# Patient Record
Sex: FEMALE | Race: WHITE | Employment: FULL TIME | URBAN - METROPOLITAN AREA
[De-identification: names, ages, dates, MRNs, and addresses within clinical notes are randomized per-mention and may not be internally consistent; named-entity substitution may affect disease eponyms.]

---

## 2018-01-01 ENCOUNTER — APPOINTMENT (OUTPATIENT)
Dept: ULTRASOUND IMAGING | Facility: CLINIC | Age: 48
End: 2018-01-01
Attending: OBSTETRICS & GYNECOLOGY
Payer: COMMERCIAL

## 2018-01-01 ENCOUNTER — TRANSFERRED RECORDS (OUTPATIENT)
Dept: HEALTH INFORMATION MANAGEMENT | Facility: CLINIC | Age: 48
End: 2018-01-01

## 2018-01-01 ENCOUNTER — HOSPITAL ENCOUNTER (OUTPATIENT)
Facility: CLINIC | Age: 48
Discharge: HOME | End: 2018-10-23
Attending: OBSTETRICS & GYNECOLOGY | Admitting: OBSTETRICS & GYNECOLOGY
Payer: COMMERCIAL

## 2018-01-01 VITALS
OXYGEN SATURATION: 98 % | DIASTOLIC BLOOD PRESSURE: 104 MMHG | HEART RATE: 90 BPM | TEMPERATURE: 99 F | SYSTOLIC BLOOD PRESSURE: 151 MMHG | RESPIRATION RATE: 16 BRPM

## 2018-01-01 LAB
COPATH REPORT: NORMAL
INR PPP: 1.21 (ref 0.86–1.14)

## 2018-01-01 PROCEDURE — 36415 COLL VENOUS BLD VENIPUNCTURE: CPT

## 2018-01-01 PROCEDURE — 88305 TISSUE EXAM BY PATHOLOGIST: CPT | Mod: 26 | Performed by: OBSTETRICS & GYNECOLOGY

## 2018-01-01 PROCEDURE — 00000155 ZZHCL STATISTIC H-CELL BLOCK W/STAIN: Performed by: OBSTETRICS & GYNECOLOGY

## 2018-01-01 PROCEDURE — 88112 CYTOPATH CELL ENHANCE TECH: CPT | Performed by: OBSTETRICS & GYNECOLOGY

## 2018-01-01 PROCEDURE — 25000125 ZZHC RX 250: Performed by: RADIOLOGY

## 2018-01-01 PROCEDURE — 88112 CYTOPATH CELL ENHANCE TECH: CPT | Mod: 26 | Performed by: OBSTETRICS & GYNECOLOGY

## 2018-01-01 PROCEDURE — 00000102 ZZHCL STATISTIC CYTO WRIGHT STAIN TC: Performed by: OBSTETRICS & GYNECOLOGY

## 2018-01-01 PROCEDURE — 27210190 US PARACENTESIS

## 2018-01-01 PROCEDURE — 85610 PROTHROMBIN TIME: CPT | Performed by: RADIOLOGY

## 2018-01-01 PROCEDURE — 88305 TISSUE EXAM BY PATHOLOGIST: CPT | Performed by: OBSTETRICS & GYNECOLOGY

## 2018-01-01 PROCEDURE — 40000863 ZZH STATISTIC RADIOLOGY XRAY, US, CT, MAR, NM

## 2018-01-01 RX ORDER — ALBUMIN (HUMAN) 12.5 G/50ML
12.5 SOLUTION INTRAVENOUS ONCE
Status: DISCONTINUED | OUTPATIENT
Start: 2018-01-01 | End: 2018-01-01 | Stop reason: HOSPADM

## 2018-01-01 RX ORDER — LIDOCAINE HYDROCHLORIDE 10 MG/ML
10 INJECTION, SOLUTION EPIDURAL; INFILTRATION; INTRACAUDAL; PERINEURAL ONCE
Status: COMPLETED | OUTPATIENT
Start: 2018-01-01 | End: 2018-01-01

## 2018-01-01 RX ORDER — LIDOCAINE 40 MG/G
CREAM TOPICAL
Status: DISCONTINUED | OUTPATIENT
Start: 2018-01-01 | End: 2018-01-01 | Stop reason: HOSPADM

## 2018-01-01 RX ADMIN — LIDOCAINE HYDROCHLORIDE 10 ML: 10 INJECTION, SOLUTION EPIDURAL; INFILTRATION; INTRACAUDAL; PERINEURAL at 19:34

## 2018-10-23 NOTE — IP AVS SNAPSHOT
[place] [address]    [place] [address]    Phone:  [phone]                                       After Visit Summary   10/23/2018    Graciela Adrian    MRN: [id number]           After Visit Summary Signature Page     I have received my discharge instructions, and my questions have been answered. I have discussed any challenges I see with this plan with the nurse or doctor.    ..........................................................................................................................................  Patient/Patient Representative Signature      ..........................................................................................................................................  Patient Representative Print Name and Relationship to Patient    ..................................................               ................................................  Date                                   Time    ..........................................................................................................................................  Reviewed by Signature/Title    ...................................................              ..............................................  Date                                               Time          22EPIC Rev 08/18

## 2018-10-23 NOTE — IP AVS SNAPSHOT
MRN:[id number]                      After Visit Summary   10/23/2018    Graciela Adrian    MRN: [id number]           Visit Information        Department      10/23/2018  5:50 PM [facility]          Review of your medicines      UNREVIEWED medicines. Ask your doctor about these medicines        Dose / Directions    ATIVAN PO        Dose:  0.5 mg   Take 0.5 mg by mouth 2 times daily   Refills:  0       SERTRALINE HCL PO        Dose:  25 mg   Take 25 mg by mouth daily   Refills:  0                Protect others around you: Learn how to safely use, store and throw away your medicines at www.disposemymeds.org.         Follow-ups after your visit         Care Instructions        Further instructions from your care team       Paracentesis Discharge Instructions     After you go home:      You may resume your normal diet.    Care of Puncture Site:      For the first 48 hrs, check your puncture site every couple hours while you are awake     If there is a bandaid - you may remove it tomorrow morning    You may shower tomorrow    No tub baths, whirlpools or swimming until your puncture site has fully healed    Fluid may leak from the site. Change the bandaid as needed - keep the site dry    If the fluid leaks for more than 48 hours, call your ordering provider     Activity:      You may go back to normal activity in 24 hours     Wait 48 hours before lifting, straining, exercise or other strenuous activity    Medicines:      You may resume all your medications    For minor pain, you may take Acetaminophen (Tylenol) or Ibuprofen (Advil)            Call the provider who ordered this procedure if:      The site is red, swollen, hot or tender    Blood or fluid is draining from the site    Chills or a fever greater than 101 F (38 C)    Pain that is getting worse    Leaking from the site that does not stop    Any questions or concerns      If you have questions call:        [facility]  "Radiology Dept @ [phone]      The provider who performed your procedure was ___Deceasre______________.     Additional Information About Your Visit        MyChart Information     [employer] lets you send messages to your doctor, view your test results, renew your prescriptions, schedule appointments and more. To sign up, go to [email or web address] . Click on \"Log in\" on the left side of the screen, which will take you to the Welcome page. Then click on \"Sign up Now\" on the right side of the page.     You will be asked to enter the access code listed below, as well as some personal information. Please follow the directions to create your username and password.     Your access code is: [email or web address]  Expires: 2019  6:20 PM     Your access code will  in 90 days. If you need help or a new code, please call your [place] clinic or [phone].        Care EveryWhere ID     This is your Care EveryWhere ID. This could be used by other organizations to access your [place] medical records  [id number]        Your Vitals Were     Blood Pressure Pulse Temperature Respirations Pulse Oximetry       147/95 (BP Location: Left arm) 90 99  F (37.2  C) (Oral) 18 98%        Primary Care Provider Fax #    Physician No Ref-Primary [phone]      Equal Access to Services     JULIO C COVARRUBIAS : Hadshara decker Sochip, waaxda luqadaha, qaybta kaaljoshua lee, ly islas . So [facility] [phone].    ATENCIÓN: Si habla español, tiene a patel disposición servicios gratuitos de asistencia lingüística. Dee al [phone].    We comply with applicable federal civil rights laws and [place] laws. We do not discriminate on the basis of race, color, national origin, age, disability, sex, sexual orientation, or gender identity.            Thank you!     Thank you for choosing [facility] for your care. Our goal is always to provide you with excellent care. Hearing back from our patients is one way " we can continue to improve our services. Please take a few minutes to complete the written survey that you may receive in the mail after you visit with us. Thank you!             Medication List: This is a list of all your medications and when to take them. Check marks below indicate your daily home schedule. Keep this list as a reference.      Medications           Morning Afternoon Evening Bedtime As Needed    ATIVAN PO   Take 0.5 mg by mouth 2 times daily                                SERTRALINE HCL PO   Take 25 mg by mouth daily

## 2018-10-23 NOTE — DISCHARGE INSTRUCTIONS

## 2018-10-24 NOTE — PROCEDURES
RADIOLOGY POST PROCEDURE NOTE    Patient name: Graciela Adrian  MRN: [id number]  : 1970    Pre-procedure diagnosis: New ascites  Post-procedure diagnosis: Same    Procedure Date/Time: 2018  7:31 PM  Procedure: US guided paracentesis with drainage of fluid in left abdomen.  No complications.  Yellow fluid.  Routine post drainage orders, including albumin, if needed.  Estimated blood loss: < 5 ml  Specimen(s) collected with description: Ascites    The patient tolerated the procedure well with no immediate complications.  Significant findings:  Please see above.    See imaging dictation for procedural details.    Provider name: Andre Koehler  Assistant(s):None

## 2018-10-24 NOTE — PROGRESS NOTES
Patient is here for paracentesis, orders sent from [place] [facility], [address] on 10/1/18.  INR drawn.  Patient states understanding of discharge instructions/AVS to patient.      6400cc aspirated during paracentesis.  Patient tolerated procedure well.  Left abd site is WDL/dressing CDI--no bleeding.    Patient discharged with /.

## 2019-01-01 ENCOUNTER — TELEPHONE (OUTPATIENT)
Dept: OTHER | Facility: CLINIC | Age: 49
End: 2019-01-01

## 2019-01-01 ENCOUNTER — APPOINTMENT (OUTPATIENT)
Dept: CT IMAGING | Facility: CLINIC | Age: 49
DRG: 441 | End: 2019-01-01
Attending: INTERNAL MEDICINE
Payer: COMMERCIAL

## 2019-01-01 ENCOUNTER — HOSPITAL ENCOUNTER (INPATIENT)
Facility: CLINIC | Age: 49
LOS: 5 days | DRG: 441 | End: 2019-09-24
Attending: INTERNAL MEDICINE | Admitting: INTERNAL MEDICINE
Payer: COMMERCIAL

## 2019-01-01 ENCOUNTER — APPOINTMENT (OUTPATIENT)
Dept: GENERAL RADIOLOGY | Facility: CLINIC | Age: 49
DRG: 441 | End: 2019-01-01
Payer: COMMERCIAL

## 2019-01-01 ENCOUNTER — APPOINTMENT (OUTPATIENT)
Dept: INTERVENTIONAL RADIOLOGY/VASCULAR | Facility: CLINIC | Age: 49
End: 2019-01-01
Attending: NURSE PRACTITIONER
Payer: COMMERCIAL

## 2019-01-01 ENCOUNTER — APPOINTMENT (OUTPATIENT)
Dept: INTERVENTIONAL RADIOLOGY/VASCULAR | Facility: CLINIC | Age: 49
DRG: 737 | End: 2019-01-01
Attending: OBSTETRICS & GYNECOLOGY
Payer: COMMERCIAL

## 2019-01-01 ENCOUNTER — ANESTHESIA EVENT (OUTPATIENT)
Dept: SURGERY | Facility: CLINIC | Age: 49
End: 2019-01-01

## 2019-01-01 ENCOUNTER — APPOINTMENT (OUTPATIENT)
Dept: GENERAL RADIOLOGY | Facility: CLINIC | Age: 49
DRG: 175 | End: 2019-01-01
Attending: PHYSICIAN ASSISTANT
Payer: COMMERCIAL

## 2019-01-01 ENCOUNTER — HOSPITAL ENCOUNTER (INPATIENT)
Facility: CLINIC | Age: 49
LOS: 4 days | Discharge: HOME | DRG: 737 | End: 2019-01-21
Attending: OBSTETRICS & GYNECOLOGY | Admitting: OBSTETRICS & GYNECOLOGY
Payer: COMMERCIAL

## 2019-01-01 ENCOUNTER — APPOINTMENT (OUTPATIENT)
Dept: ULTRASOUND IMAGING | Facility: CLINIC | Age: 49
DRG: 175 | End: 2019-01-01
Attending: INTERNAL MEDICINE
Payer: COMMERCIAL

## 2019-01-01 ENCOUNTER — APPOINTMENT (OUTPATIENT)
Dept: CARDIOLOGY | Facility: CLINIC | Age: 49
DRG: 441 | End: 2019-01-01
Attending: INTERNAL MEDICINE
Payer: COMMERCIAL

## 2019-01-01 ENCOUNTER — ANESTHESIA EVENT (OUTPATIENT)
Dept: SURGERY | Facility: CLINIC | Age: 49
DRG: 737 | End: 2019-01-01
Payer: COMMERCIAL

## 2019-01-01 ENCOUNTER — APPOINTMENT (OUTPATIENT)
Dept: ULTRASOUND IMAGING | Facility: CLINIC | Age: 49
DRG: 175 | End: 2019-01-01
Attending: HOSPITALIST
Payer: COMMERCIAL

## 2019-01-01 ENCOUNTER — HOSPITAL ENCOUNTER (OUTPATIENT)
Facility: CLINIC | Age: 49
Discharge: HOME | End: 2019-04-22
Attending: RADIOLOGY | Admitting: RADIOLOGY
Payer: COMMERCIAL

## 2019-01-01 ENCOUNTER — ANESTHESIA (OUTPATIENT)
Dept: SURGERY | Facility: CLINIC | Age: 49
End: 2019-01-01

## 2019-01-01 ENCOUNTER — TRANSFERRED RECORDS (OUTPATIENT)
Dept: HEALTH INFORMATION MANAGEMENT | Facility: CLINIC | Age: 49
End: 2019-01-01

## 2019-01-01 ENCOUNTER — APPOINTMENT (OUTPATIENT)
Dept: CARDIOLOGY | Facility: CLINIC | Age: 49
DRG: 175 | End: 2019-01-01
Attending: INTERNAL MEDICINE
Payer: COMMERCIAL

## 2019-01-01 ENCOUNTER — HOSPITAL ENCOUNTER (OUTPATIENT)
Dept: LAB | Facility: CLINIC | Age: 49
Discharge: HOME | End: 2019-07-26
Attending: NURSE PRACTITIONER | Admitting: NURSE PRACTITIONER
Payer: COMMERCIAL

## 2019-01-01 ENCOUNTER — HOSPITAL ENCOUNTER (INPATIENT)
Facility: CLINIC | Age: 49
LOS: 3 days | Discharge: HOME | DRG: 175 | End: 2019-07-25
Attending: INTERNAL MEDICINE | Admitting: INTERNAL MEDICINE
Payer: COMMERCIAL

## 2019-01-01 ENCOUNTER — ANESTHESIA (OUTPATIENT)
Dept: SURGERY | Facility: CLINIC | Age: 49
DRG: 737 | End: 2019-01-01
Payer: COMMERCIAL

## 2019-01-01 VITALS
HEART RATE: 100 BPM | DIASTOLIC BLOOD PRESSURE: 62 MMHG | SYSTOLIC BLOOD PRESSURE: 118 MMHG | HEIGHT: 64 IN | TEMPERATURE: 98.4 F | OXYGEN SATURATION: 97 % | WEIGHT: 180 LBS | BODY MASS INDEX: 30.73 KG/M2 | RESPIRATION RATE: 16 BRPM

## 2019-01-01 VITALS
SYSTOLIC BLOOD PRESSURE: 113 MMHG | TEMPERATURE: 97.1 F | OXYGEN SATURATION: 59 % | HEART RATE: 117 BPM | BODY MASS INDEX: 30.46 KG/M2 | WEIGHT: 177.47 LBS | RESPIRATION RATE: 2 BRPM | DIASTOLIC BLOOD PRESSURE: 86 MMHG

## 2019-01-01 VITALS
BODY MASS INDEX: 27.35 KG/M2 | OXYGEN SATURATION: 98 % | SYSTOLIC BLOOD PRESSURE: 118 MMHG | WEIGHT: 160.2 LBS | TEMPERATURE: 95.8 F | DIASTOLIC BLOOD PRESSURE: 74 MMHG | HEART RATE: 65 BPM | HEIGHT: 64 IN | RESPIRATION RATE: 18 BRPM

## 2019-01-01 VITALS
RESPIRATION RATE: 16 BRPM | SYSTOLIC BLOOD PRESSURE: 133 MMHG | DIASTOLIC BLOOD PRESSURE: 93 MMHG | TEMPERATURE: 98.2 F | OXYGEN SATURATION: 95 % | BODY MASS INDEX: 31.07 KG/M2 | WEIGHT: 182 LBS | HEIGHT: 64 IN | HEART RATE: 95 BPM

## 2019-01-01 DIAGNOSIS — R19.00 PELVIC MASS: Primary | ICD-10-CM

## 2019-01-01 DIAGNOSIS — C56.9 OVARIAN CANCER, UNSPECIFIED LATERALITY (H): ICD-10-CM

## 2019-01-01 DIAGNOSIS — C56.2 OVARIAN CANCER, LEFT (H): ICD-10-CM

## 2019-01-01 DIAGNOSIS — C19 COLORECTAL CANCER (H): Primary | ICD-10-CM

## 2019-01-01 DIAGNOSIS — C56.9 OVARIAN CANCER, UNSPECIFIED LATERALITY (H): Primary | ICD-10-CM

## 2019-01-01 DIAGNOSIS — C56.2 OVARIAN CANCER, LEFT (H): Primary | ICD-10-CM

## 2019-01-01 LAB
ABO + RH BLD: NORMAL
ALBUMIN FLD-MCNC: 1.1 G/DL
ALBUMIN SERPL-MCNC: 0.8 G/DL (ref 3.4–5)
ALBUMIN SERPL-MCNC: 0.9 G/DL (ref 3.4–5)
ALBUMIN SERPL-MCNC: 0.9 G/DL (ref 3.4–5)
ALBUMIN SERPL-MCNC: 1.7 G/DL (ref 3.4–5)
ALBUMIN SERPL-MCNC: 2.1 G/DL (ref 3.4–5)
ALBUMIN SERPL-MCNC: 2.5 G/DL (ref 3.4–5)
ALBUMIN SERPL-MCNC: 2.8 G/DL (ref 3.4–5)
ALBUMIN UR-MCNC: 10 MG/DL
ALP SERPL-CCNC: 282 U/L (ref 40–150)
ALP SERPL-CCNC: 290 U/L (ref 40–150)
ALP SERPL-CCNC: 331 U/L (ref 40–150)
ALP SERPL-CCNC: 41 U/L (ref 40–150)
ALP SERPL-CCNC: 451 U/L (ref 40–150)
ALP SERPL-CCNC: 469 U/L (ref 40–150)
ALP SERPL-CCNC: 503 U/L (ref 40–150)
ALT SERPL W P-5'-P-CCNC: 108 U/L (ref 0–50)
ALT SERPL W P-5'-P-CCNC: 130 U/L (ref 0–50)
ALT SERPL W P-5'-P-CCNC: 130 U/L (ref 0–50)
ALT SERPL W P-5'-P-CCNC: 168 U/L (ref 0–50)
ALT SERPL W P-5'-P-CCNC: 20 U/L (ref 0–50)
ALT SERPL W P-5'-P-CCNC: 58 U/L (ref 0–50)
ALT SERPL W P-5'-P-CCNC: 97 U/L (ref 0–50)
ALT SERPL-CCNC: 93 U/L (ref 8–45)
AMMONIA PLAS-SCNC: 41 UMOL/L (ref 10–50)
AMORPH CRY #/AREA URNS HPF: ABNORMAL /HPF
ANION GAP SERPL CALCULATED.3IONS-SCNC: 10 MMOL/L (ref 3–14)
ANION GAP SERPL CALCULATED.3IONS-SCNC: 10 MMOL/L (ref 3–14)
ANION GAP SERPL CALCULATED.3IONS-SCNC: 11 MMOL/L (ref 3–14)
ANION GAP SERPL CALCULATED.3IONS-SCNC: 13 MMOL/L (ref 3–14)
ANION GAP SERPL CALCULATED.3IONS-SCNC: 15 MMOL/L (ref 3–14)
ANION GAP SERPL CALCULATED.3IONS-SCNC: 18 MMOL/L (ref 3–14)
ANION GAP SERPL CALCULATED.3IONS-SCNC: 22 MMOL/L (ref 3–14)
ANION GAP SERPL CALCULATED.3IONS-SCNC: 6 MMOL/L (ref 3–14)
ANION GAP SERPL CALCULATED.3IONS-SCNC: 8 MMOL/L (ref 3–14)
ANION GAP SERPL CALCULATED.3IONS-SCNC: 9 MMOL/L (ref 3–14)
ANION GAP SERPL CALCULATED.3IONS-SCNC: 9 MMOL/L (ref 3–14)
ANISOCYTOSIS BLD QL SMEAR: SLIGHT
APPEARANCE FLD: NORMAL
APPEARANCE UR: ABNORMAL
APTT PPP: 141 SEC (ref 22–37)
APTT PPP: 31 SEC (ref 22–37)
APTT PPP: 43 SEC (ref 22–37)
APTT PPP: 83 SEC (ref 22–37)
AST SERPL W P-5'-P-CCNC: 128 U/L (ref 0–45)
AST SERPL W P-5'-P-CCNC: 164 U/L (ref 0–45)
AST SERPL W P-5'-P-CCNC: 472 U/L (ref 0–45)
AST SERPL W P-5'-P-CCNC: 585 U/L (ref 0–45)
AST SERPL W P-5'-P-CCNC: 782 U/L (ref 0–45)
AST SERPL W P-5'-P-CCNC: 9 U/L (ref 0–45)
AST SERPL W P-5'-P-CCNC: 904 U/L (ref 0–45)
AST SERPL-CCNC: 141 U/L (ref 5–41)
B-HCG SERPL-ACNC: 2 IU/L (ref 0–5)
BASE DEFICIT BLDA-SCNC: 10.1 MMOL/L
BASE DEFICIT BLDA-SCNC: 11.3 MMOL/L
BASE DEFICIT BLDA-SCNC: 16.6 MMOL/L
BASE DEFICIT BLDA-SCNC: 22.9 MMOL/L
BASE DEFICIT BLDV-SCNC: 15.5 MMOL/L
BASOPHILS # BLD AUTO: 0 10E9/L (ref 0–0.2)
BASOPHILS NFR BLD AUTO: 0 %
BASOPHILS NFR BLD AUTO: 0.2 %
BASOPHILS NFR BLD AUTO: 0.2 %
BILIRUB SERPL-MCNC: 0.2 MG/DL (ref 0.2–1.3)
BILIRUB SERPL-MCNC: 1.2 MG/DL (ref 0.2–1.3)
BILIRUB SERPL-MCNC: 1.3 MG/DL (ref 0.2–1.3)
BILIRUB SERPL-MCNC: 1.9 MG/DL (ref 0.2–1.3)
BILIRUB SERPL-MCNC: 2.7 MG/DL (ref 0.2–1.3)
BILIRUB SERPL-MCNC: 4.6 MG/DL (ref 0.2–1.3)
BILIRUB SERPL-MCNC: 4.7 MG/DL (ref 0.2–1.3)
BILIRUB UR QL STRIP: ABNORMAL
BLD GP AB SCN SERPL QL: NORMAL
BLD GP AB SCN SERPL QL: NORMAL
BLD PROD TYP BPU: NORMAL
BLD UNIT ID BPU: 0
BLOOD BANK CMNT PATIENT-IMP: NORMAL
BLOOD BANK CMNT PATIENT-IMP: NORMAL
BLOOD PRODUCT CODE: NORMAL
BPU ID: NORMAL
BUN SERPL-MCNC: 35 MG/DL (ref 7–30)
BUN SERPL-MCNC: 40 MG/DL (ref 7–30)
BUN SERPL-MCNC: 40 MG/DL (ref 7–30)
BUN SERPL-MCNC: 42 MG/DL (ref 7–30)
BUN SERPL-MCNC: 44 MG/DL (ref 7–30)
BUN SERPL-MCNC: 44 MG/DL (ref 7–30)
BUN SERPL-MCNC: 45 MG/DL (ref 7–30)
BUN SERPL-MCNC: 45 MG/DL (ref 7–30)
BUN SERPL-MCNC: 7 MG/DL (ref 7–30)
BUN SERPL-MCNC: 8 MG/DL (ref 7–30)
BUN SERPL-MCNC: 8 MG/DL (ref 7–30)
BURR CELLS BLD QL SMEAR: SLIGHT
BURR CELLS BLD QL SMEAR: SLIGHT
CA-I BLD-MCNC: 3.7 MG/DL (ref 4.4–5.2)
CA-I BLD-MCNC: 3.8 MG/DL (ref 4.4–5.2)
CA-I SERPL ISE-MCNC: 3.6 MG/DL (ref 4.4–5.2)
CA-I SERPL ISE-MCNC: 3.7 MG/DL (ref 4.4–5.2)
CA-I SERPL ISE-MCNC: 3.7 MG/DL (ref 4.4–5.2)
CA-I SERPL ISE-MCNC: 3.9 MG/DL (ref 4.4–5.2)
CALCIUM SERPL-MCNC: 6 MG/DL (ref 8.5–10.1)
CALCIUM SERPL-MCNC: 6 MG/DL (ref 8.5–10.1)
CALCIUM SERPL-MCNC: 6.1 MG/DL (ref 8.5–10.1)
CALCIUM SERPL-MCNC: 6.3 MG/DL (ref 8.5–10.1)
CALCIUM SERPL-MCNC: 6.4 MG/DL (ref 8.5–10.1)
CALCIUM SERPL-MCNC: 6.7 MG/DL (ref 8.5–10.1)
CALCIUM SERPL-MCNC: 8.2 MG/DL (ref 8.5–10.1)
CALCIUM SERPL-MCNC: 8.3 MG/DL (ref 8.5–10.1)
CALCIUM SERPL-MCNC: 8.4 MG/DL (ref 8.5–10.1)
CALCIUM SERPL-MCNC: <5 MG/DL (ref 8.5–10.1)
CANCER AG125 SERPL-ACNC: 84 U/ML (ref 0–30)
CHLORIDE SERPL-SCNC: 100 MMOL/L (ref 94–109)
CHLORIDE SERPL-SCNC: 100 MMOL/L (ref 94–109)
CHLORIDE SERPL-SCNC: 101 MMOL/L (ref 94–109)
CHLORIDE SERPL-SCNC: 102 MMOL/L (ref 94–109)
CHLORIDE SERPL-SCNC: 102 MMOL/L (ref 94–109)
CHLORIDE SERPL-SCNC: 104 MMOL/L (ref 94–109)
CHLORIDE SERPL-SCNC: 104 MMOL/L (ref 94–109)
CHLORIDE SERPL-SCNC: 105 MMOL/L (ref 94–109)
CHLORIDE SERPL-SCNC: 105 MMOL/L (ref 94–109)
CHLORIDE SERPL-SCNC: 106 MMOL/L (ref 94–109)
CHLORIDE SERPL-SCNC: 106 MMOL/L (ref 94–109)
CHLORIDE SERPL-SCNC: 120 MMOL/L (ref 94–109)
CHLORIDE SERPL-SCNC: 99 MMOL/L (ref 94–109)
CO2 SERPL-SCNC: 10 MMOL/L (ref 20–32)
CO2 SERPL-SCNC: 11 MMOL/L (ref 20–32)
CO2 SERPL-SCNC: 13 MMOL/L (ref 20–32)
CO2 SERPL-SCNC: 15 MMOL/L (ref 20–32)
CO2 SERPL-SCNC: 16 MMOL/L (ref 20–32)
CO2 SERPL-SCNC: 17 MMOL/L (ref 20–32)
CO2 SERPL-SCNC: 18 MMOL/L (ref 20–32)
CO2 SERPL-SCNC: 25 MMOL/L (ref 20–32)
CO2 SERPL-SCNC: 26 MMOL/L (ref 20–32)
CO2 SERPL-SCNC: 26 MMOL/L (ref 20–32)
CO2 SERPL-SCNC: 7 MMOL/L (ref 20–32)
COLOR FLD: YELLOW
COLOR UR AUTO: ABNORMAL
COPATH REPORT: NORMAL
CORTIS SERPL-MCNC: >150 UG/DL (ref 4–22)
CREAT SERPL-MCNC: 0.68 MG/DL (ref 0.52–1.04)
CREAT SERPL-MCNC: 0.7 MG/DL (ref 0.52–1.04)
CREAT SERPL-MCNC: 0.71 MG/DL (ref 0.52–1.04)
CREAT SERPL-MCNC: 0.73 MG/DL (ref 0.52–1.04)
CREAT SERPL-MCNC: 0.74 MG/DL (ref 0.52–1.04)
CREAT SERPL-MCNC: 0.78 MG/DL (ref 0.52–1.04)
CREAT SERPL-MCNC: 0.81 MG/DL (ref 0.52–1.04)
CREAT SERPL-MCNC: 0.94 MG/DL (ref 0.52–1.04)
CREAT SERPL-MCNC: 0.95 MG/DL (ref 0.52–1.04)
CREAT SERPL-MCNC: 1.08 MG/DL (ref 0.52–1.04)
CREAT SERPL-MCNC: 1.08 MG/DL (ref 0.52–1.04)
CREAT SERPL-MCNC: 1.09 MG/DL (ref 0.52–1.04)
CREAT SERPL-MCNC: 1.1 MG/DL (ref 0.52–1.04)
CREAT SERPL-MCNC: 1.1 MG/DL (ref 0.52–1.04)
CREAT SERPL-MCNC: 1.13 MG/DL (ref 0.52–1.04)
CREAT SERPL-MCNC: 1.17 MG/DL (ref 0.52–1.04)
CREAT SERPL-MCNC: 1.24 MG/DL (ref 0.57–1.11)
DIFFERENTIAL METHOD BLD: ABNORMAL
EOSINOPHIL # BLD AUTO: 0 10E9/L (ref 0–0.7)
EOSINOPHIL NFR BLD AUTO: 0 %
ERYTHROCYTE [DISTWIDTH] IN BLOOD BY AUTOMATED COUNT: 17.7 % (ref 10–15)
ERYTHROCYTE [DISTWIDTH] IN BLOOD BY AUTOMATED COUNT: 18.8 % (ref 10–15)
ERYTHROCYTE [DISTWIDTH] IN BLOOD BY AUTOMATED COUNT: 18.8 % (ref 10–15)
ERYTHROCYTE [DISTWIDTH] IN BLOOD BY AUTOMATED COUNT: 19.4 % (ref 10–15)
ERYTHROCYTE [DISTWIDTH] IN BLOOD BY AUTOMATED COUNT: 19.4 % (ref 10–15)
ERYTHROCYTE [DISTWIDTH] IN BLOOD BY AUTOMATED COUNT: 19.9 % (ref 10–15)
ERYTHROCYTE [DISTWIDTH] IN BLOOD BY AUTOMATED COUNT: 20 % (ref 10–15)
ERYTHROCYTE [DISTWIDTH] IN BLOOD BY AUTOMATED COUNT: 21 % (ref 10–15)
ERYTHROCYTE [DISTWIDTH] IN BLOOD BY AUTOMATED COUNT: 22.6 % (ref 10–15)
ERYTHROCYTE [DISTWIDTH] IN BLOOD BY AUTOMATED COUNT: 22.7 % (ref 10–15)
ERYTHROCYTE [DISTWIDTH] IN BLOOD BY AUTOMATED COUNT: 22.8 % (ref 10–15)
ERYTHROCYTE [DISTWIDTH] IN BLOOD BY AUTOMATED COUNT: 23 % (ref 10–15)
ERYTHROCYTE [DISTWIDTH] IN BLOOD BY AUTOMATED COUNT: 23 % (ref 10–15)
ERYTHROCYTE [DISTWIDTH] IN BLOOD BY AUTOMATED COUNT: 23.1 % (ref 10–15)
ERYTHROCYTE [DISTWIDTH] IN BLOOD BY AUTOMATED COUNT: 23.2 % (ref 10–15)
FIBRINOGEN PPP-MCNC: 431 MG/DL (ref 200–420)
GFR SERPL CREATININE-BSD FRML MDRD: 51 ML/MIN/1.73M(2)
GFR SERPL CREATININE-BSD FRML MDRD: 54 ML/MIN/{1.73_M2}
GFR SERPL CREATININE-BSD FRML MDRD: 57 ML/MIN/{1.73_M2}
GFR SERPL CREATININE-BSD FRML MDRD: 58 ML/MIN/{1.73_M2}
GFR SERPL CREATININE-BSD FRML MDRD: 58 ML/MIN/{1.73_M2}
GFR SERPL CREATININE-BSD FRML MDRD: 59 ML/MIN/{1.73_M2}
GFR SERPL CREATININE-BSD FRML MDRD: 60 ML/MIN/{1.73_M2}
GFR SERPL CREATININE-BSD FRML MDRD: 60 ML/MIN/{1.73_M2}
GFR SERPL CREATININE-BSD FRML MDRD: 70 ML/MIN/{1.73_M2}
GFR SERPL CREATININE-BSD FRML MDRD: 71 ML/MIN/{1.73_M2}
GFR SERPL CREATININE-BSD FRML MDRD: 84 ML/MIN/{1.73_M2}
GFR SERPL CREATININE-BSD FRML MDRD: 89 ML/MIN/{1.73_M2}
GFR SERPL CREATININE-BSD FRML MDRD: >90 ML/MIN/{1.73_M2}
GLUCOSE BLDC GLUCOMTR-MCNC: 101 MG/DL (ref 70–99)
GLUCOSE BLDC GLUCOMTR-MCNC: 111 MG/DL (ref 70–99)
GLUCOSE BLDC GLUCOMTR-MCNC: 115 MG/DL (ref 70–99)
GLUCOSE BLDC GLUCOMTR-MCNC: 116 MG/DL (ref 70–99)
GLUCOSE BLDC GLUCOMTR-MCNC: 116 MG/DL (ref 70–99)
GLUCOSE BLDC GLUCOMTR-MCNC: 137 MG/DL (ref 70–99)
GLUCOSE BLDC GLUCOMTR-MCNC: 147 MG/DL (ref 70–99)
GLUCOSE BLDC GLUCOMTR-MCNC: 177 MG/DL (ref 70–99)
GLUCOSE BLDC GLUCOMTR-MCNC: 32 MG/DL (ref 70–99)
GLUCOSE BLDC GLUCOMTR-MCNC: 47 MG/DL (ref 70–99)
GLUCOSE BLDC GLUCOMTR-MCNC: 70 MG/DL (ref 70–99)
GLUCOSE BLDC GLUCOMTR-MCNC: 77 MG/DL (ref 70–99)
GLUCOSE BLDC GLUCOMTR-MCNC: 84 MG/DL (ref 70–99)
GLUCOSE BLDC GLUCOMTR-MCNC: 96 MG/DL (ref 70–99)
GLUCOSE SERPL-MCNC: 103 MG/DL (ref 70–99)
GLUCOSE SERPL-MCNC: 111 MG/DL (ref 70–99)
GLUCOSE SERPL-MCNC: 113 MG/DL (ref 70–99)
GLUCOSE SERPL-MCNC: 115 MG/DL (ref 70–99)
GLUCOSE SERPL-MCNC: 115 MG/DL (ref 70–99)
GLUCOSE SERPL-MCNC: 117 MG/DL (ref 70–99)
GLUCOSE SERPL-MCNC: 131 MG/DL (ref 70–99)
GLUCOSE SERPL-MCNC: 136 MG/DL (ref 70–99)
GLUCOSE SERPL-MCNC: 145 MG/DL (ref 70–99)
GLUCOSE SERPL-MCNC: 28 MG/DL (ref 70–99)
GLUCOSE SERPL-MCNC: 67 MG/DL (ref 70–105)
GLUCOSE SERPL-MCNC: 84 MG/DL (ref 70–99)
GLUCOSE SERPL-MCNC: 86 MG/DL (ref 70–99)
GLUCOSE SERPL-MCNC: 87 MG/DL (ref 70–99)
GLUCOSE SERPL-MCNC: 99 MG/DL (ref 70–99)
GLUCOSE UR STRIP-MCNC: NEGATIVE MG/DL
GRAM STN SPEC: NORMAL
GRAM STN SPEC: NORMAL
GRAN CASTS #/AREA URNS LPF: 1 /LPF
HCO3 BLD-SCNC: 15 MMOL/L (ref 21–28)
HCO3 BLD-SCNC: 15 MMOL/L (ref 21–28)
HCO3 BLD-SCNC: 6 MMOL/L (ref 21–28)
HCO3 BLD-SCNC: 9 MMOL/L (ref 21–28)
HCO3 BLDV-SCNC: 12 MMOL/L (ref 21–28)
HCT VFR BLD AUTO: 20.5 % (ref 35–47)
HCT VFR BLD AUTO: 26.3 % (ref 35–47)
HCT VFR BLD AUTO: 28.2 % (ref 35–47)
HCT VFR BLD AUTO: 28.3 % (ref 35–47)
HCT VFR BLD AUTO: 29.6 % (ref 35–47)
HCT VFR BLD AUTO: 30.6 % (ref 35–47)
HCT VFR BLD AUTO: 30.9 % (ref 35–47)
HCT VFR BLD AUTO: 31.3 % (ref 35–47)
HCT VFR BLD AUTO: 31.5 % (ref 35–47)
HCT VFR BLD AUTO: 31.9 % (ref 35–47)
HCT VFR BLD AUTO: 31.9 % (ref 35–47)
HCT VFR BLD AUTO: 32 % (ref 35–47)
HCT VFR BLD AUTO: 32.1 % (ref 35–47)
HCT VFR BLD AUTO: 32.6 % (ref 35–47)
HCT VFR BLD AUTO: 33.9 % (ref 35–47)
HGB BLD-MCNC: 10.1 G/DL (ref 11.7–15.7)
HGB BLD-MCNC: 10.1 G/DL (ref 11.7–15.7)
HGB BLD-MCNC: 10.2 G/DL (ref 11.7–15.7)
HGB BLD-MCNC: 10.3 G/DL (ref 11.7–15.7)
HGB BLD-MCNC: 10.3 G/DL (ref 11.7–15.7)
HGB BLD-MCNC: 10.4 G/DL (ref 11.7–15.7)
HGB BLD-MCNC: 10.4 G/DL (ref 11.7–15.7)
HGB BLD-MCNC: 10.9 G/DL (ref 11.7–15.7)
HGB BLD-MCNC: 6.1 G/DL (ref 11.7–15.7)
HGB BLD-MCNC: 6.2 G/DL (ref 11.7–15.7)
HGB BLD-MCNC: 7.8 G/DL (ref 11.7–15.7)
HGB BLD-MCNC: 8.5 G/DL (ref 11.7–15.7)
HGB BLD-MCNC: 8.7 G/DL (ref 11.7–15.7)
HGB BLD-MCNC: 8.7 G/DL (ref 11.7–15.7)
HGB BLD-MCNC: 9 G/DL (ref 11.7–15.7)
HGB BLD-MCNC: 9.4 G/DL (ref 11.7–15.7)
HGB BLD-MCNC: 9.6 G/DL (ref 11.7–15.7)
HGB BLD-MCNC: 9.7 G/DL (ref 11.7–15.7)
HGB UR QL STRIP: NEGATIVE
HYALINE CASTS #/AREA URNS LPF: 14 /LPF (ref 0–2)
IMM GRANULOCYTES # BLD: 0 10E9/L (ref 0–0.4)
IMM GRANULOCYTES # BLD: 0.2 10E9/L (ref 0–0.4)
IMM GRANULOCYTES NFR BLD: 0.3 %
IMM GRANULOCYTES NFR BLD: 1.3 %
INR PPP: 1.03 (ref 0.86–1.14)
INR PPP: 1.07 (ref 0.86–1.14)
INR PPP: 1.24 (ref 0.86–1.14)
INR PPP: 1.4 (ref 0.9–1.1)
INR PPP: 2.25 (ref 0.86–1.14)
INR PPP: 2.44 (ref 0.86–1.14)
INR PPP: 2.58 (ref 0.86–1.14)
INR PPP: 2.68 (ref 0.86–1.14)
INR PPP: 5.62 (ref 0.86–1.14)
KETONES UR STRIP-MCNC: NEGATIVE MG/DL
LACTATE BLD-SCNC: 11.5 MMOL/L (ref 0.7–2)
LACTATE BLD-SCNC: 2.3 MMOL/L (ref 0.7–2)
LACTATE BLD-SCNC: 2.4 MMOL/L (ref 0.7–2)
LACTATE BLD-SCNC: 3.1 MMOL/L (ref 0.7–2)
LACTATE BLD-SCNC: 3.4 MMOL/L (ref 0.7–2)
LACTATE BLD-SCNC: 3.9 MMOL/L (ref 0.7–2)
LACTATE BLD-SCNC: 4.1 MMOL/L (ref 0.7–2)
LACTATE BLD-SCNC: 6 MMOL/L (ref 0.7–2)
LACTATE BLD-SCNC: 7.7 MMOL/L (ref 0.7–2)
LACTATE BLD-SCNC: 9.3 MMOL/L (ref 0.7–2)
LDH SERPL L TO P-CCNC: 2023 U/L (ref 81–234)
LEUKOCYTE ESTERASE UR QL STRIP: NEGATIVE
LMWH PPP CHRO-ACNC: 0.94 IU/ML
LYMPHOCYTES # BLD AUTO: 0.8 10E9/L (ref 0.8–5.3)
LYMPHOCYTES # BLD AUTO: 0.8 10E9/L (ref 0.8–5.3)
LYMPHOCYTES # BLD AUTO: 1 10E9/L (ref 0.8–5.3)
LYMPHOCYTES NFR BLD AUTO: 14.5 %
LYMPHOCYTES NFR BLD AUTO: 8 %
LYMPHOCYTES NFR BLD AUTO: 8.7 %
LYMPHOCYTES NFR FLD MANUAL: 4 %
MAGNESIUM SERPL-MCNC: 1.9 MG/DL (ref 1.6–2.3)
MAGNESIUM SERPL-MCNC: 2.9 MG/DL (ref 1.6–2.3)
MAGNESIUM SERPL-MCNC: 3.4 MG/DL (ref 1.6–2.3)
MCH RBC QN AUTO: 25.7 PG (ref 26.5–33)
MCH RBC QN AUTO: 25.9 PG (ref 26.5–33)
MCH RBC QN AUTO: 26 PG (ref 26.5–33)
MCH RBC QN AUTO: 26.3 PG (ref 26.5–33)
MCH RBC QN AUTO: 26.4 PG (ref 26.5–33)
MCH RBC QN AUTO: 26.6 PG (ref 26.5–33)
MCH RBC QN AUTO: 26.7 PG (ref 26.5–33)
MCH RBC QN AUTO: 26.8 PG (ref 26.5–33)
MCH RBC QN AUTO: 27.1 PG (ref 26.5–33)
MCH RBC QN AUTO: 27.7 PG (ref 26.5–33)
MCH RBC QN AUTO: 27.9 PG (ref 26.5–33)
MCH RBC QN AUTO: 28.2 PG (ref 26.5–33)
MCH RBC QN AUTO: 28.3 PG (ref 26.5–33)
MCH RBC QN AUTO: 28.5 PG (ref 26.5–33)
MCH RBC QN AUTO: 28.5 PG (ref 26.5–33)
MCH RBC QN AUTO: 28.6 PG (ref 26.5–33)
MCH RBC QN AUTO: 28.8 PG (ref 26.5–33)
MCHC RBC AUTO-ENTMCNC: 29.4 G/DL (ref 31.5–36.5)
MCHC RBC AUTO-ENTMCNC: 29.7 G/DL (ref 31.5–36.5)
MCHC RBC AUTO-ENTMCNC: 29.8 G/DL (ref 31.5–36.5)
MCHC RBC AUTO-ENTMCNC: 30.1 G/DL (ref 31.5–36.5)
MCHC RBC AUTO-ENTMCNC: 30.7 G/DL (ref 31.5–36.5)
MCHC RBC AUTO-ENTMCNC: 30.7 G/DL (ref 31.5–36.5)
MCHC RBC AUTO-ENTMCNC: 31.1 G/DL (ref 31.5–36.5)
MCHC RBC AUTO-ENTMCNC: 31.7 G/DL (ref 31.5–36.5)
MCHC RBC AUTO-ENTMCNC: 31.9 G/DL (ref 31.5–36.5)
MCHC RBC AUTO-ENTMCNC: 32 G/DL (ref 31.5–36.5)
MCHC RBC AUTO-ENTMCNC: 32.1 G/DL (ref 31.5–36.5)
MCHC RBC AUTO-ENTMCNC: 32.2 G/DL (ref 31.5–36.5)
MCHC RBC AUTO-ENTMCNC: 32.2 G/DL (ref 31.5–36.5)
MCHC RBC AUTO-ENTMCNC: 32.4 G/DL (ref 31.5–36.5)
MCHC RBC AUTO-ENTMCNC: 32.9 G/DL (ref 31.5–36.5)
MCV RBC AUTO: 84 FL (ref 78–100)
MCV RBC AUTO: 85 FL (ref 78–100)
MCV RBC AUTO: 86 FL (ref 78–100)
MCV RBC AUTO: 87 FL (ref 78–100)
MCV RBC AUTO: 88 FL (ref 78–100)
MCV RBC AUTO: 89 FL (ref 78–100)
MCV RBC AUTO: 90 FL (ref 78–100)
MCV RBC AUTO: 90 FL (ref 78–100)
MCV RBC AUTO: 96 FL (ref 78–100)
METAMYELOCYTES # BLD: 0.2 10E9/L
METAMYELOCYTES NFR BLD MANUAL: 2 %
MONOCYTES # BLD AUTO: 0.1 10E9/L (ref 0–1.3)
MONOCYTES # BLD AUTO: 0.5 10E9/L (ref 0–1.3)
MONOCYTES # BLD AUTO: 0.6 10E9/L (ref 0–1.3)
MONOCYTES NFR BLD AUTO: 1 %
MONOCYTES NFR BLD AUTO: 11 %
MONOCYTES NFR BLD AUTO: 3.9 %
MONOS+MACROS NFR FLD MANUAL: 94 %
MRSA DNA SPEC QL NAA+PROBE: NEGATIVE
MUCOUS THREADS #/AREA URNS LPF: PRESENT /LPF
NEUTROPHILS # BLD AUTO: 10.2 10E9/L (ref 1.6–8.3)
NEUTROPHILS # BLD AUTO: 4.2 10E9/L (ref 1.6–8.3)
NEUTROPHILS # BLD AUTO: 8.8 10E9/L (ref 1.6–8.3)
NEUTROPHILS NFR BLD AUTO: 74 %
NEUTROPHILS NFR BLD AUTO: 85.9 %
NEUTROPHILS NFR BLD AUTO: 89 %
NEUTS BAND NFR FLD MANUAL: 2 %
NITRATE UR QL: NEGATIVE
NRBC # BLD AUTO: 0 10*3/UL
NRBC # BLD AUTO: 0.4 10*3/UL
NRBC # BLD AUTO: 0.8 10*3/UL
NRBC BLD AUTO-RTO: 0 /100
NRBC BLD AUTO-RTO: 4 /100
NRBC BLD AUTO-RTO: 8 /100
NUM BPU REQUESTED: 2
NUM BPU REQUESTED: 2
O2/TOTAL GAS SETTING VFR VENT: 40 %
O2/TOTAL GAS SETTING VFR VENT: ABNORMAL %
O2/TOTAL GAS SETTING VFR VENT: ABNORMAL %
OXYHGB MFR BLD: 95 % (ref 92–100)
OXYHGB MFR BLD: 96 % (ref 92–100)
OXYHGB MFR BLD: 97 % (ref 92–100)
OXYHGB MFR BLD: 97 % (ref 92–100)
OXYHGB MFR BLDV: 57 %
PCO2 BLD: 21 MM HG (ref 35–45)
PCO2 BLD: 22 MM HG (ref 35–45)
PCO2 BLD: 27 MM HG (ref 35–45)
PCO2 BLD: 33 MM HG (ref 35–45)
PCO2 BLDV: 35 MM HG (ref 40–50)
PH BLD: 7.04 PH (ref 7.35–7.45)
PH BLD: 7.25 PH (ref 7.35–7.45)
PH BLD: 7.26 PH (ref 7.35–7.45)
PH BLD: 7.33 PH (ref 7.35–7.45)
PH BLDV: 7.16 PH (ref 7.32–7.43)
PH UR STRIP: 5.5 PH (ref 5–7)
PHOSPHATE SERPL-MCNC: 2.6 MG/DL (ref 2.5–4.5)
PHOSPHATE SERPL-MCNC: 2.9 MG/DL (ref 2.5–4.5)
PHOSPHATE SERPL-MCNC: 3.1 MG/DL (ref 2.5–4.5)
PHOSPHATE SERPL-MCNC: 3.1 MG/DL (ref 2.5–4.5)
PLATELET # BLD AUTO: 10 10E9/L (ref 150–450)
PLATELET # BLD AUTO: 135 10E9/L (ref 150–450)
PLATELET # BLD AUTO: 14 10E9/L (ref 150–450)
PLATELET # BLD AUTO: 18 10E9/L (ref 150–450)
PLATELET # BLD AUTO: 18 10E9/L (ref 150–450)
PLATELET # BLD AUTO: 190 10E9/L (ref 150–450)
PLATELET # BLD AUTO: 21 10E9/L (ref 150–450)
PLATELET # BLD AUTO: 21 10E9/L (ref 150–450)
PLATELET # BLD AUTO: 213 10E9/L (ref 150–450)
PLATELET # BLD AUTO: 22 10E9/L (ref 150–450)
PLATELET # BLD AUTO: 24 10E9/L (ref 150–450)
PLATELET # BLD AUTO: 24 10E9/L (ref 150–450)
PLATELET # BLD AUTO: 25 10E9/L (ref 150–450)
PLATELET # BLD AUTO: 257 10E9/L (ref 150–450)
PLATELET # BLD AUTO: 263 10E9/L (ref 150–450)
PLATELET # BLD AUTO: 28 10E9/L (ref 150–450)
PLATELET # BLD AUTO: 308 10E9/L (ref 150–450)
PLATELET # BLD AUTO: 323 10E9/L (ref 150–450)
PLATELET # BLD AUTO: 33 10E9/L (ref 150–450)
PLATELET # BLD AUTO: 36 10E9/L (ref 150–450)
PLATELET # BLD EST: ABNORMAL 10*3/UL
PLATELET # BLD EST: ABNORMAL 10*3/UL
PO2 BLD: 125 MM HG (ref 80–105)
PO2 BLD: 148 MM HG (ref 80–105)
PO2 BLD: 189 MM HG (ref 80–105)
PO2 BLD: 198 MM HG (ref 80–105)
PO2 BLDV: 38 MM HG (ref 25–47)
POLYCHROMASIA BLD QL SMEAR: SLIGHT
POTASSIUM SERPL-SCNC: 3.1 MMOL/L (ref 3.4–5.3)
POTASSIUM SERPL-SCNC: 3.2 MMOL/L (ref 3.4–5.3)
POTASSIUM SERPL-SCNC: 3.4 MMOL/L (ref 3.4–5.3)
POTASSIUM SERPL-SCNC: 3.8 MMOL/L (ref 3.4–5.3)
POTASSIUM SERPL-SCNC: 3.9 MMOL/L (ref 3.4–5.3)
POTASSIUM SERPL-SCNC: 4 MMOL/L (ref 3.4–5.3)
POTASSIUM SERPL-SCNC: 4.1 MMOL/L (ref 3.4–5.3)
POTASSIUM SERPL-SCNC: 4.2 MMOL/L (ref 3.4–5.3)
POTASSIUM SERPL-SCNC: 4.7 MMOL/L (ref 3.4–5.3)
POTASSIUM SERPL-SCNC: 4.8 MMOL/L (ref 3.4–5.3)
POTASSIUM SERPL-SCNC: 4.9 MMOL/L (ref 3.4–5.3)
POTASSIUM SERPL-SCNC: 4.9 MMOL/L (ref 3.4–5.3)
POTASSIUM SERPL-SCNC: 5 MMOL/L (ref 3.4–5.3)
POTASSIUM SERPL-SCNC: 5.4 MMOL/L (ref 3.4–5.3)
POTASSIUM SERPL-SCNC: 6.1 MMOL/L (ref 3.5–5.1)
POTASSIUM SERPL-SCNC: 6.4 MMOL/L (ref 3.4–5.3)
PROT FLD-MCNC: 2.8 G/DL
PROT SERPL-MCNC: 2.3 G/DL (ref 6.8–8.8)
PROT SERPL-MCNC: 3.4 G/DL (ref 6.8–8.8)
PROT SERPL-MCNC: 3.8 G/DL (ref 6.8–8.8)
PROT SERPL-MCNC: 3.9 G/DL (ref 6.8–8.8)
PROT SERPL-MCNC: 4 G/DL (ref 6.8–8.8)
PROT SERPL-MCNC: 4.3 G/DL (ref 6.8–8.8)
PROT SERPL-MCNC: 5.9 G/DL (ref 6.8–8.8)
RBC # BLD AUTO: 2.29 10E12/L (ref 3.8–5.2)
RBC # BLD AUTO: 2.95 10E12/L (ref 3.8–5.2)
RBC # BLD AUTO: 3 10E12/L (ref 3.8–5.2)
RBC # BLD AUTO: 3.01 10E12/L (ref 3.8–5.2)
RBC # BLD AUTO: 3.03 10E12/L (ref 3.8–5.2)
RBC # BLD AUTO: 3.26 10E12/L (ref 3.8–5.2)
RBC # BLD AUTO: 3.29 10E12/L (ref 3.8–5.2)
RBC # BLD AUTO: 3.46 10E12/L (ref 3.8–5.2)
RBC # BLD AUTO: 3.57 10E12/L (ref 3.8–5.2)
RBC # BLD AUTO: 3.58 10E12/L (ref 3.8–5.2)
RBC # BLD AUTO: 3.6 10E12/L (ref 3.8–5.2)
RBC # BLD AUTO: 3.64 10E12/L (ref 3.8–5.2)
RBC # BLD AUTO: 3.65 10E12/L (ref 3.8–5.2)
RBC # BLD AUTO: 3.73 10E12/L (ref 3.8–5.2)
RBC # BLD AUTO: 3.73 10E12/L (ref 3.8–5.2)
RBC # BLD AUTO: 3.8 10E12/L (ref 3.8–5.2)
RBC # BLD AUTO: 3.83 10E12/L (ref 3.8–5.2)
RBC #/AREA URNS AUTO: 1 /HPF (ref 0–2)
SODIUM SERPL-SCNC: 126 MMOL/L (ref 133–144)
SODIUM SERPL-SCNC: 127 MMOL/L (ref 133–144)
SODIUM SERPL-SCNC: 130 MMOL/L (ref 133–144)
SODIUM SERPL-SCNC: 131 MMOL/L (ref 133–144)
SODIUM SERPL-SCNC: 134 MMOL/L (ref 133–144)
SODIUM SERPL-SCNC: 138 MMOL/L (ref 133–144)
SODIUM SERPL-SCNC: 138 MMOL/L (ref 133–144)
SODIUM SERPL-SCNC: 139 MMOL/L (ref 133–144)
SODIUM SERPL-SCNC: 152 MMOL/L (ref 133–144)
SOURCE: ABNORMAL
SP GR UR STRIP: 1.03 (ref 1–1.03)
SPECIMEN EXP DATE BLD: NORMAL
SPECIMEN EXP DATE BLD: NORMAL
SPECIMEN SOURCE FLD: NORMAL
SPECIMEN SOURCE: NORMAL
SPECIMEN SOURCE: NORMAL
TRANSFUSION STATUS PATIENT QL: NORMAL
UROBILINOGEN UR STRIP-MCNC: 4 MG/DL (ref 0–2)
VANCOMYCIN SERPL-MCNC: 17.3 MG/L
VANCOMYCIN SERPL-MCNC: 22.3 MG/L
VANCOMYCIN SERPL-MCNC: 25.5 MG/L
VANCOMYCIN SERPL-MCNC: 41.5 MG/L
WBC # BLD AUTO: 1.6 10E9/L (ref 4–11)
WBC # BLD AUTO: 11.9 10E9/L (ref 4–11)
WBC # BLD AUTO: 11.9 10E9/L (ref 4–11)
WBC # BLD AUTO: 12.1 10E9/L (ref 4–11)
WBC # BLD AUTO: 13.5 10E9/L (ref 4–11)
WBC # BLD AUTO: 2.3 10E9/L (ref 4–11)
WBC # BLD AUTO: 2.4 10E9/L (ref 4–11)
WBC # BLD AUTO: 3.2 10E9/L (ref 4–11)
WBC # BLD AUTO: 4.6 10E9/L (ref 4–11)
WBC # BLD AUTO: 4.8 10E9/L (ref 4–11)
WBC # BLD AUTO: 5.1 10E9/L (ref 4–11)
WBC # BLD AUTO: 5.5 10E9/L (ref 4–11)
WBC # BLD AUTO: 5.7 10E9/L (ref 4–11)
WBC # BLD AUTO: 8.9 10E9/L (ref 4–11)
WBC # BLD AUTO: 9.9 10E9/L (ref 4–11)
WBC # FLD AUTO: 210 /UL
WBC #/AREA URNS AUTO: 2 /HPF (ref 0–5)

## 2019-01-01 PROCEDURE — 83605 ASSAY OF LACTIC ACID: CPT

## 2019-01-01 PROCEDURE — 81301 MICROSATELLITE INSTABILITY: CPT | Performed by: NURSE PRACTITIONER

## 2019-01-01 PROCEDURE — 88341 IMHCHEM/IMCYTCHM EA ADD ANTB: CPT | Mod: 26 | Performed by: OBSTETRICS & GYNECOLOGY

## 2019-01-01 PROCEDURE — 82042 OTHER SOURCE ALBUMIN QUAN EA: CPT | Performed by: INTERNAL MEDICINE

## 2019-01-01 PROCEDURE — 85610 PROTHROMBIN TIME: CPT | Performed by: RADIOLOGY

## 2019-01-01 PROCEDURE — 25000132 ZZH RX MED GY IP 250 OP 250 PS 637: Performed by: INTERNAL MEDICINE

## 2019-01-01 PROCEDURE — 85730 THROMBOPLASTIN TIME PARTIAL: CPT | Performed by: INTERNAL MEDICINE

## 2019-01-01 PROCEDURE — 3E043XZ INTRODUCTION OF VASOPRESSOR INTO CENTRAL VEIN, PERCUTANEOUS APPROACH: ICD-10-PCS | Performed by: INTERNAL MEDICINE

## 2019-01-01 PROCEDURE — 40000170 ZZH STATISTIC PRE-PROCEDURE ASSESSMENT II: Performed by: OBSTETRICS & GYNECOLOGY

## 2019-01-01 PROCEDURE — 00000146 ZZHCL STATISTIC GLUCOSE BY METER IP

## 2019-01-01 PROCEDURE — 94660 CPAP INITIATION&MGMT: CPT

## 2019-01-01 PROCEDURE — G0463 HOSPITAL OUTPT CLINIC VISIT: HCPCS

## 2019-01-01 PROCEDURE — 83605 ASSAY OF LACTIC ACID: CPT | Performed by: INTERNAL MEDICINE

## 2019-01-01 PROCEDURE — 25000125 ZZHC RX 250: Performed by: RADIOLOGY

## 2019-01-01 PROCEDURE — 85027 COMPLETE CBC AUTOMATED: CPT | Performed by: RADIOLOGY

## 2019-01-01 PROCEDURE — 82805 BLOOD GASES W/O2 SATURATION: CPT | Performed by: INTERNAL MEDICINE

## 2019-01-01 PROCEDURE — 93321 DOPPLER ECHO F-UP/LMTD STD: CPT | Mod: 26 | Performed by: INTERNAL MEDICINE

## 2019-01-01 PROCEDURE — 84132 ASSAY OF SERUM POTASSIUM: CPT | Performed by: RADIOLOGY

## 2019-01-01 PROCEDURE — 80053 COMPREHEN METABOLIC PANEL: CPT | Performed by: INTERNAL MEDICINE

## 2019-01-01 PROCEDURE — 86850 RBC ANTIBODY SCREEN: CPT | Performed by: OBSTETRICS & GYNECOLOGY

## 2019-01-01 PROCEDURE — 25000128 H RX IP 250 OP 636: Performed by: INTERNAL MEDICINE

## 2019-01-01 PROCEDURE — 25000128 H RX IP 250 OP 636: Performed by: NURSE ANESTHETIST, CERTIFIED REGISTERED

## 2019-01-01 PROCEDURE — 40000264 ECHOCARDIOGRAM LIMITED

## 2019-01-01 PROCEDURE — 25000125 ZZHC RX 250: Performed by: INTERNAL MEDICINE

## 2019-01-01 PROCEDURE — 25000128 H RX IP 250 OP 636: Performed by: NURSE PRACTITIONER

## 2019-01-01 PROCEDURE — 25000125 ZZHC RX 250: Performed by: NURSE ANESTHETIST, CERTIFIED REGISTERED

## 2019-01-01 PROCEDURE — 85027 COMPLETE CBC AUTOMATED: CPT | Performed by: INTERNAL MEDICINE

## 2019-01-01 PROCEDURE — 85610 PROTHROMBIN TIME: CPT | Performed by: HOSPITALIST

## 2019-01-01 PROCEDURE — P9041 ALBUMIN (HUMAN),5%, 50ML: HCPCS | Performed by: NURSE PRACTITIONER

## 2019-01-01 PROCEDURE — 25000132 ZZH RX MED GY IP 250 OP 250 PS 637: Performed by: NURSE PRACTITIONER

## 2019-01-01 PROCEDURE — 82330 ASSAY OF CALCIUM: CPT | Performed by: INTERNAL MEDICINE

## 2019-01-01 PROCEDURE — 80048 BASIC METABOLIC PNL TOTAL CA: CPT | Performed by: INTERNAL MEDICINE

## 2019-01-01 PROCEDURE — 12000000 ZZH R&B MED SURG/OB

## 2019-01-01 PROCEDURE — 86304 IMMUNOASSAY TUMOR CA 125: CPT | Performed by: HOSPITALIST

## 2019-01-01 PROCEDURE — 85018 HEMOGLOBIN: CPT | Performed by: NURSE PRACTITIONER

## 2019-01-01 PROCEDURE — 88307 TISSUE EXAM BY PATHOLOGIST: CPT | Mod: 26 | Performed by: OBSTETRICS & GYNECOLOGY

## 2019-01-01 PROCEDURE — 20000003 ZZH R&B ICU

## 2019-01-01 PROCEDURE — 25000125 ZZHC RX 250: Performed by: PHYSICIAN ASSISTANT

## 2019-01-01 PROCEDURE — 87040 BLOOD CULTURE FOR BACTERIA: CPT | Performed by: INTERNAL MEDICINE

## 2019-01-01 PROCEDURE — 99291 CRITICAL CARE FIRST HOUR: CPT | Mod: 25 | Performed by: INTERNAL MEDICINE

## 2019-01-01 PROCEDURE — 84132 ASSAY OF SERUM POTASSIUM: CPT | Performed by: HOSPITALIST

## 2019-01-01 PROCEDURE — 37000008 ZZH ANESTHESIA TECHNICAL FEE, 1ST 30 MIN: Performed by: OBSTETRICS & GYNECOLOGY

## 2019-01-01 PROCEDURE — 0W9G3ZX DRAINAGE OF PERITONEAL CAVITY, PERCUTANEOUS APPROACH, DIAGNOSTIC: ICD-10-PCS | Performed by: INTERNAL MEDICINE

## 2019-01-01 PROCEDURE — 99233 SBSQ HOSP IP/OBS HIGH 50: CPT | Performed by: HOSPITALIST

## 2019-01-01 PROCEDURE — 84100 ASSAY OF PHOSPHORUS: CPT | Performed by: INTERNAL MEDICINE

## 2019-01-01 PROCEDURE — C1773 RET DEV, INSERTABLE: HCPCS

## 2019-01-01 PROCEDURE — 25000131 ZZH RX MED GY IP 250 OP 636 PS 637: Performed by: INTERNAL MEDICINE

## 2019-01-01 PROCEDURE — 85025 COMPLETE CBC W/AUTO DIFF WBC: CPT | Performed by: INTERNAL MEDICINE

## 2019-01-01 PROCEDURE — 36415 COLL VENOUS BLD VENIPUNCTURE: CPT | Performed by: RADIOLOGY

## 2019-01-01 PROCEDURE — 0UT20ZZ RESECTION OF BILATERAL OVARIES, OPEN APPROACH: ICD-10-PCS | Performed by: OBSTETRICS & GYNECOLOGY

## 2019-01-01 PROCEDURE — 82533 TOTAL CORTISOL: CPT | Performed by: INTERNAL MEDICINE

## 2019-01-01 PROCEDURE — 83735 ASSAY OF MAGNESIUM: CPT | Performed by: INTERNAL MEDICINE

## 2019-01-01 PROCEDURE — 71045 X-RAY EXAM CHEST 1 VIEW: CPT

## 2019-01-01 PROCEDURE — XW043H4 INTRODUCTION OF SYNTHETIC HUMAN ANGIOTENSIN II INTO CENTRAL VEIN, PERCUTANEOUS APPROACH, NEW TECHNOLOGY GROUP 4: ICD-10-PCS | Performed by: INTERNAL MEDICINE

## 2019-01-01 PROCEDURE — 93306 TTE W/DOPPLER COMPLETE: CPT

## 2019-01-01 PROCEDURE — 25800030 ZZH RX IP 258 OP 636: Performed by: INTERNAL MEDICINE

## 2019-01-01 PROCEDURE — 87070 CULTURE OTHR SPECIMN AEROBIC: CPT | Performed by: INTERNAL MEDICINE

## 2019-01-01 PROCEDURE — 25000128 H RX IP 250 OP 636

## 2019-01-01 PROCEDURE — 80202 ASSAY OF VANCOMYCIN: CPT | Performed by: INTERNAL MEDICINE

## 2019-01-01 PROCEDURE — 06H03DZ INSERTION OF INTRALUMINAL DEVICE INTO INFERIOR VENA CAVA, PERCUTANEOUS APPROACH: ICD-10-PCS | Performed by: RADIOLOGY

## 2019-01-01 PROCEDURE — 37193 REM ENDOVAS VENA CAVA FILTER: CPT

## 2019-01-01 PROCEDURE — 25800025 ZZH RX 258: Performed by: NURSE PRACTITIONER

## 2019-01-01 PROCEDURE — 36569 INSJ PICC 5 YR+ W/O IMAGING: CPT

## 2019-01-01 PROCEDURE — 25000128 H RX IP 250 OP 636: Performed by: OBSTETRICS & GYNECOLOGY

## 2019-01-01 PROCEDURE — 93325 DOPPLER ECHO COLOR FLOW MAPG: CPT | Mod: 26 | Performed by: INTERNAL MEDICINE

## 2019-01-01 PROCEDURE — 99292 CRITICAL CARE ADDL 30 MIN: CPT | Mod: 25 | Performed by: INTERNAL MEDICINE

## 2019-01-01 PROCEDURE — 40000257 ZZH STATISTIC CONSULT NO CHARGE VASC ACCESS

## 2019-01-01 PROCEDURE — 36591 DRAW BLOOD OFF VENOUS DEVICE: CPT

## 2019-01-01 PROCEDURE — 40000275 ZZH STATISTIC RCP TIME EA 10 MIN

## 2019-01-01 PROCEDURE — 25800025 ZZH RX 258

## 2019-01-01 PROCEDURE — 27210905 ZZH KIT CR7

## 2019-01-01 PROCEDURE — 88302 TISSUE EXAM BY PATHOLOGIST: CPT | Performed by: OBSTETRICS & GYNECOLOGY

## 2019-01-01 PROCEDURE — 85730 THROMBOPLASTIN TIME PARTIAL: CPT | Performed by: RADIOLOGY

## 2019-01-01 PROCEDURE — 00000159 ZZHCL STATISTIC H-SEND OUTS PREP: Performed by: NURSE PRACTITIONER

## 2019-01-01 PROCEDURE — 25000132 ZZH RX MED GY IP 250 OP 250 PS 637: Performed by: HOSPITALIST

## 2019-01-01 PROCEDURE — 27210219 ZZH KIT SHRLOCK 5FR DBL LUM PWR P

## 2019-01-01 PROCEDURE — 88305 TISSUE EXAM BY PATHOLOGIST: CPT | Performed by: OBSTETRICS & GYNECOLOGY

## 2019-01-01 PROCEDURE — 80048 BASIC METABOLIC PNL TOTAL CA: CPT | Performed by: HOSPITALIST

## 2019-01-01 PROCEDURE — 93308 TTE F-UP OR LMTD: CPT | Mod: 26 | Performed by: INTERNAL MEDICINE

## 2019-01-01 PROCEDURE — 84157 ASSAY OF PROTEIN OTHER: CPT | Performed by: INTERNAL MEDICINE

## 2019-01-01 PROCEDURE — 85049 AUTOMATED PLATELET COUNT: CPT | Performed by: INTERNAL MEDICINE

## 2019-01-01 PROCEDURE — 86900 BLOOD TYPING SEROLOGIC ABO: CPT | Performed by: INTERNAL MEDICINE

## 2019-01-01 PROCEDURE — 27210190 US PARACENTESIS

## 2019-01-01 PROCEDURE — 99239 HOSP IP/OBS DSCHRG MGMT >30: CPT | Performed by: INTERNAL MEDICINE

## 2019-01-01 PROCEDURE — 80048 BASIC METABOLIC PNL TOTAL CA: CPT

## 2019-01-01 PROCEDURE — 25000128 H RX IP 250 OP 636: Performed by: ANESTHESIOLOGY

## 2019-01-01 PROCEDURE — 36415 COLL VENOUS BLD VENIPUNCTURE: CPT | Performed by: NURSE PRACTITIONER

## 2019-01-01 PROCEDURE — 25500064 ZZH RX 255 OP 636: Performed by: INTERNAL MEDICINE

## 2019-01-01 PROCEDURE — 85018 HEMOGLOBIN: CPT | Performed by: RADIOLOGY

## 2019-01-01 PROCEDURE — 25000132 ZZH RX MED GY IP 250 OP 250 PS 637: Performed by: PHYSICIAN ASSISTANT

## 2019-01-01 PROCEDURE — 88360 TUMOR IMMUNOHISTOCHEM/MANUAL: CPT | Mod: 26 | Performed by: NURSE PRACTITIONER

## 2019-01-01 PROCEDURE — 87641 MR-STAPH DNA AMP PROBE: CPT | Performed by: INTERNAL MEDICINE

## 2019-01-01 PROCEDURE — 80053 COMPREHEN METABOLIC PANEL: CPT | Performed by: NURSE PRACTITIONER

## 2019-01-01 PROCEDURE — 27210886 ZZH ACCESSORY CR5

## 2019-01-01 PROCEDURE — 40000239 ZZH STATISTIC VAT ROUNDS

## 2019-01-01 PROCEDURE — 85610 PROTHROMBIN TIME: CPT | Performed by: INTERNAL MEDICINE

## 2019-01-01 PROCEDURE — 0W9B3ZZ DRAINAGE OF LEFT PLEURAL CAVITY, PERCUTANEOUS APPROACH: ICD-10-PCS | Performed by: PHYSICIAN ASSISTANT

## 2019-01-01 PROCEDURE — C9113 INJ PANTOPRAZOLE SODIUM, VIA: HCPCS | Performed by: NURSE PRACTITIONER

## 2019-01-01 PROCEDURE — 25000128 H RX IP 250 OP 636: Performed by: RADIOLOGY

## 2019-01-01 PROCEDURE — 85027 COMPLETE CBC AUTOMATED: CPT | Performed by: HOSPITALIST

## 2019-01-01 PROCEDURE — 49083 ABD PARACENTESIS W/IMAGING: CPT | Performed by: INTERNAL MEDICINE

## 2019-01-01 PROCEDURE — 02H633Z INSERTION OF INFUSION DEVICE INTO RIGHT ATRIUM, PERCUTANEOUS APPROACH: ICD-10-PCS | Performed by: INTERNAL MEDICINE

## 2019-01-01 PROCEDURE — 99223 1ST HOSP IP/OBS HIGH 75: CPT | Mod: AI | Performed by: INTERNAL MEDICINE

## 2019-01-01 PROCEDURE — 0UT70ZZ RESECTION OF BILATERAL FALLOPIAN TUBES, OPEN APPROACH: ICD-10-PCS | Performed by: OBSTETRICS & GYNECOLOGY

## 2019-01-01 PROCEDURE — 27211316 ZZ H MASK, CPAP TOTAL HEADGEAR, LATEX FREE

## 2019-01-01 PROCEDURE — 86901 BLOOD TYPING SEROLOGIC RH(D): CPT | Performed by: OBSTETRICS & GYNECOLOGY

## 2019-01-01 PROCEDURE — 27210338 ZZH CIRCUIT HUMID FACE/TRACH MSK

## 2019-01-01 PROCEDURE — 36620 INSERTION CATHETER ARTERY: CPT | Performed by: INTERNAL MEDICINE

## 2019-01-01 PROCEDURE — 86923 COMPATIBILITY TEST ELECTRIC: CPT | Performed by: OBSTETRICS & GYNECOLOGY

## 2019-01-01 PROCEDURE — C1765 ADHESION BARRIER: HCPCS | Performed by: OBSTETRICS & GYNECOLOGY

## 2019-01-01 PROCEDURE — 25800025 ZZH RX 258: Performed by: INTERNAL MEDICINE

## 2019-01-01 PROCEDURE — P9041 ALBUMIN (HUMAN),5%, 50ML: HCPCS | Performed by: NURSE ANESTHETIST, CERTIFIED REGISTERED

## 2019-01-01 PROCEDURE — 40000986 XR CHEST 1 VW

## 2019-01-01 PROCEDURE — 99291 CRITICAL CARE FIRST HOUR: CPT

## 2019-01-01 PROCEDURE — C1769 GUIDE WIRE: HCPCS

## 2019-01-01 PROCEDURE — 36000093 ZZH SURGERY LEVEL 4 1ST 30 MIN: Performed by: OBSTETRICS & GYNECOLOGY

## 2019-01-01 PROCEDURE — 86901 BLOOD TYPING SEROLOGIC RH(D): CPT | Performed by: INTERNAL MEDICINE

## 2019-01-01 PROCEDURE — 25500064 ZZH RX 255 OP 636: Performed by: RADIOLOGY

## 2019-01-01 PROCEDURE — 88342 IMHCHEM/IMCYTCHM 1ST ANTB: CPT | Performed by: OBSTETRICS & GYNECOLOGY

## 2019-01-01 PROCEDURE — 71000013 ZZH RECOVERY PHASE 1 LEVEL 1 EA ADDTL HR: Performed by: OBSTETRICS & GYNECOLOGY

## 2019-01-01 PROCEDURE — 86304 IMMUNOASSAY TUMOR CA 125: CPT | Performed by: OBSTETRICS & GYNECOLOGY

## 2019-01-01 PROCEDURE — P9047 ALBUMIN (HUMAN), 25%, 50ML: HCPCS | Performed by: INTERNAL MEDICINE

## 2019-01-01 PROCEDURE — 83615 LACTATE (LD) (LDH) ENZYME: CPT | Performed by: INTERNAL MEDICINE

## 2019-01-01 PROCEDURE — 27210794 ZZH OR GENERAL SUPPLY STERILE: Performed by: OBSTETRICS & GYNECOLOGY

## 2019-01-01 PROCEDURE — 0UT90ZZ RESECTION OF UTERUS, OPEN APPROACH: ICD-10-PCS | Performed by: OBSTETRICS & GYNECOLOGY

## 2019-01-01 PROCEDURE — 25000125 ZZHC RX 250: Performed by: OBSTETRICS & GYNECOLOGY

## 2019-01-01 PROCEDURE — 87640 STAPH A DNA AMP PROBE: CPT | Performed by: INTERNAL MEDICINE

## 2019-01-01 PROCEDURE — 88360 TUMOR IMMUNOHISTOCHEM/MANUAL: CPT | Performed by: OBSTETRICS & GYNECOLOGY

## 2019-01-01 PROCEDURE — 25000131 ZZH RX MED GY IP 250 OP 636 PS 637: Performed by: NURSE PRACTITIONER

## 2019-01-01 PROCEDURE — 25800030 ZZH RX IP 258 OP 636

## 2019-01-01 PROCEDURE — 85384 FIBRINOGEN ACTIVITY: CPT | Performed by: INTERNAL MEDICINE

## 2019-01-01 PROCEDURE — 99152 MOD SED SAME PHYS/QHP 5/>YRS: CPT

## 2019-01-01 PROCEDURE — 99232 SBSQ HOSP IP/OBS MODERATE 35: CPT | Performed by: INTERNAL MEDICINE

## 2019-01-01 PROCEDURE — 40000853 ZZH STATISTIC ANGIOGRAM, STENT, VERTEBRO PLASTY

## 2019-01-01 PROCEDURE — 36415 COLL VENOUS BLD VENIPUNCTURE: CPT | Performed by: INTERNAL MEDICINE

## 2019-01-01 PROCEDURE — 93306 TTE W/DOPPLER COMPLETE: CPT | Mod: 26 | Performed by: INTERNAL MEDICINE

## 2019-01-01 PROCEDURE — 74176 CT ABD & PELVIS W/O CONTRAST: CPT

## 2019-01-01 PROCEDURE — 0DBU0ZZ EXCISION OF OMENTUM, OPEN APPROACH: ICD-10-PCS | Performed by: OBSTETRICS & GYNECOLOGY

## 2019-01-01 PROCEDURE — 99291 CRITICAL CARE FIRST HOUR: CPT | Performed by: INTERNAL MEDICINE

## 2019-01-01 PROCEDURE — 88307 TISSUE EXAM BY PATHOLOGIST: CPT | Performed by: OBSTETRICS & GYNECOLOGY

## 2019-01-01 PROCEDURE — 85018 HEMOGLOBIN: CPT | Performed by: INTERNAL MEDICINE

## 2019-01-01 PROCEDURE — 25000132 ZZH RX MED GY IP 250 OP 250 PS 637

## 2019-01-01 PROCEDURE — 81001 URINALYSIS AUTO W/SCOPE: CPT | Performed by: INTERNAL MEDICINE

## 2019-01-01 PROCEDURE — 32555 ASPIRATE PLEURA W/ IMAGING: CPT

## 2019-01-01 PROCEDURE — 85027 COMPLETE CBC AUTOMATED: CPT

## 2019-01-01 PROCEDURE — 85520 HEPARIN ASSAY: CPT | Performed by: INTERNAL MEDICINE

## 2019-01-01 PROCEDURE — 88341 IMHCHEM/IMCYTCHM EA ADD ANTB: CPT | Performed by: OBSTETRICS & GYNECOLOGY

## 2019-01-01 PROCEDURE — 82565 ASSAY OF CREATININE: CPT | Performed by: RADIOLOGY

## 2019-01-01 PROCEDURE — 88305 TISSUE EXAM BY PATHOLOGIST: CPT | Mod: 26 | Performed by: OBSTETRICS & GYNECOLOGY

## 2019-01-01 PROCEDURE — 71000012 ZZH RECOVERY PHASE 1 LEVEL 1 FIRST HR: Performed by: OBSTETRICS & GYNECOLOGY

## 2019-01-01 PROCEDURE — 86850 RBC ANTIBODY SCREEN: CPT | Performed by: INTERNAL MEDICINE

## 2019-01-01 PROCEDURE — 89051 BODY FLUID CELL COUNT: CPT | Performed by: INTERNAL MEDICINE

## 2019-01-01 PROCEDURE — 0W9J30Z DRAINAGE OF PELVIC CAVITY WITH DRAINAGE DEVICE, PERCUTANEOUS APPROACH: ICD-10-PCS | Performed by: OBSTETRICS & GYNECOLOGY

## 2019-01-01 PROCEDURE — 25000125 ZZHC RX 250

## 2019-01-01 PROCEDURE — 88360 TUMOR IMMUNOHISTOCHEM/MANUAL: CPT | Mod: 26,59 | Performed by: OBSTETRICS & GYNECOLOGY

## 2019-01-01 PROCEDURE — 87205 SMEAR GRAM STAIN: CPT | Performed by: INTERNAL MEDICINE

## 2019-01-01 PROCEDURE — P9041 ALBUMIN (HUMAN),5%, 50ML: HCPCS

## 2019-01-01 PROCEDURE — 0DBW0ZZ EXCISION OF PERITONEUM, OPEN APPROACH: ICD-10-PCS | Performed by: OBSTETRICS & GYNECOLOGY

## 2019-01-01 PROCEDURE — 82947 ASSAY GLUCOSE BLOOD QUANT: CPT | Performed by: OBSTETRICS & GYNECOLOGY

## 2019-01-01 PROCEDURE — 5A09457 ASSISTANCE WITH RESPIRATORY VENTILATION, 24-96 CONSECUTIVE HOURS, CONTINUOUS POSITIVE AIRWAY PRESSURE: ICD-10-PCS | Performed by: INTERNAL MEDICINE

## 2019-01-01 PROCEDURE — 88360 TUMOR IMMUNOHISTOCHEM/MANUAL: CPT | Performed by: NURSE PRACTITIONER

## 2019-01-01 PROCEDURE — 85049 AUTOMATED PLATELET COUNT: CPT | Performed by: NURSE PRACTITIONER

## 2019-01-01 PROCEDURE — 25000566 ZZH SEVOFLURANE, EA 15 MIN: Performed by: OBSTETRICS & GYNECOLOGY

## 2019-01-01 PROCEDURE — 36415 COLL VENOUS BLD VENIPUNCTURE: CPT | Performed by: OBSTETRICS & GYNECOLOGY

## 2019-01-01 PROCEDURE — 82140 ASSAY OF AMMONIA: CPT | Performed by: INTERNAL MEDICINE

## 2019-01-01 PROCEDURE — 0W9G3ZZ DRAINAGE OF PERITONEAL CAVITY, PERCUTANEOUS APPROACH: ICD-10-PCS | Performed by: PHYSICIAN ASSISTANT

## 2019-01-01 PROCEDURE — 37000009 ZZH ANESTHESIA TECHNICAL FEE, EACH ADDTL 15 MIN: Performed by: OBSTETRICS & GYNECOLOGY

## 2019-01-01 PROCEDURE — 88360 TUMOR IMMUNOHISTOCHEM/MANUAL: CPT | Mod: 26 | Performed by: OBSTETRICS & GYNECOLOGY

## 2019-01-01 PROCEDURE — 36000063 ZZH SURGERY LEVEL 4 EA 15 ADDTL MIN: Performed by: OBSTETRICS & GYNECOLOGY

## 2019-01-01 PROCEDURE — 82565 ASSAY OF CREATININE: CPT | Performed by: NURSE PRACTITIONER

## 2019-01-01 PROCEDURE — 25000125 ZZHC RX 250: Performed by: ANESTHESIOLOGY

## 2019-01-01 PROCEDURE — 82947 ASSAY GLUCOSE BLOOD QUANT: CPT | Performed by: NURSE PRACTITIONER

## 2019-01-01 PROCEDURE — 86900 BLOOD TYPING SEROLOGIC ABO: CPT | Performed by: OBSTETRICS & GYNECOLOGY

## 2019-01-01 PROCEDURE — 25800030 ZZH RX IP 258 OP 636: Performed by: RADIOLOGY

## 2019-01-01 PROCEDURE — 40000863 ZZH STATISTIC RADIOLOGY XRAY, US, CT, MAR, NM

## 2019-01-01 PROCEDURE — P9037 PLATE PHERES LEUKOREDU IRRAD: HCPCS | Performed by: INTERNAL MEDICINE

## 2019-01-01 PROCEDURE — 0DTJ0ZZ RESECTION OF APPENDIX, OPEN APPROACH: ICD-10-PCS | Performed by: OBSTETRICS & GYNECOLOGY

## 2019-01-01 PROCEDURE — P9016 RBC LEUKOCYTES REDUCED: HCPCS | Performed by: INTERNAL MEDICINE

## 2019-01-01 PROCEDURE — 0W9G3ZZ DRAINAGE OF PERITONEAL CAVITY, PERCUTANEOUS APPROACH: ICD-10-PCS | Performed by: OBSTETRICS & GYNECOLOGY

## 2019-01-01 PROCEDURE — 86923 COMPATIBILITY TEST ELECTRIC: CPT | Performed by: INTERNAL MEDICINE

## 2019-01-01 PROCEDURE — 88342 IMHCHEM/IMCYTCHM 1ST ANTB: CPT | Mod: 26 | Performed by: OBSTETRICS & GYNECOLOGY

## 2019-01-01 PROCEDURE — 99292 CRITICAL CARE ADDL 30 MIN: CPT | Performed by: INTERNAL MEDICINE

## 2019-01-01 PROCEDURE — 88302 TISSUE EXAM BY PATHOLOGIST: CPT | Mod: 26 | Performed by: OBSTETRICS & GYNECOLOGY

## 2019-01-01 PROCEDURE — 85025 COMPLETE CBC W/AUTO DIFF WBC: CPT | Performed by: NURSE PRACTITIONER

## 2019-01-01 PROCEDURE — 84702 CHORIONIC GONADOTROPIN TEST: CPT | Performed by: RADIOLOGY

## 2019-01-01 PROCEDURE — C1880 VENA CAVA FILTER: HCPCS

## 2019-01-01 PROCEDURE — 27211193 ZZ H WOUND GLUE CR1

## 2019-01-01 RX ORDER — SODIUM CHLORIDE 1 G/1
1 TABLET ORAL DAILY
COMMUNITY

## 2019-01-01 RX ORDER — SODIUM CHLORIDE, SODIUM LACTATE, POTASSIUM CHLORIDE, CALCIUM CHLORIDE 600; 310; 30; 20 MG/100ML; MG/100ML; MG/100ML; MG/100ML
INJECTION, SOLUTION INTRAVENOUS CONTINUOUS
Status: DISCONTINUED | OUTPATIENT
Start: 2019-01-01 | End: 2019-01-01

## 2019-01-01 RX ORDER — DEXTROSE MONOHYDRATE 25 G/50ML
25 INJECTION, SOLUTION INTRAVENOUS ONCE
Status: DISCONTINUED | OUTPATIENT
Start: 2019-01-01 | End: 2019-01-01

## 2019-01-01 RX ORDER — HEPARIN SODIUM,PORCINE 10 UNIT/ML
5-10 VIAL (ML) INTRAVENOUS EVERY 24 HOURS
Status: DISCONTINUED | OUTPATIENT
Start: 2019-01-01 | End: 2019-01-01 | Stop reason: HOSPADM

## 2019-01-01 RX ORDER — LIDOCAINE HYDROCHLORIDE 10 MG/ML
1-30 INJECTION, SOLUTION EPIDURAL; INFILTRATION; INTRACAUDAL; PERINEURAL
Status: COMPLETED | OUTPATIENT
Start: 2019-01-01 | End: 2019-01-01

## 2019-01-01 RX ORDER — FENTANYL CITRATE 50 UG/ML
INJECTION, SOLUTION INTRAMUSCULAR; INTRAVENOUS
Status: DISCONTINUED
Start: 2019-01-01 | End: 2019-01-01 | Stop reason: HOSPADM

## 2019-01-01 RX ORDER — POTASSIUM CHLORIDE 1500 MG/1
20 TABLET, EXTENDED RELEASE ORAL DAILY
COMMUNITY

## 2019-01-01 RX ORDER — NICOTINE POLACRILEX 4 MG
15-30 LOZENGE BUCCAL
Status: DISCONTINUED | OUTPATIENT
Start: 2019-01-01 | End: 2019-01-01 | Stop reason: HOSPADM

## 2019-01-01 RX ORDER — NALOXONE HYDROCHLORIDE 0.4 MG/ML
.1-.4 INJECTION, SOLUTION INTRAMUSCULAR; INTRAVENOUS; SUBCUTANEOUS
Status: DISCONTINUED | OUTPATIENT
Start: 2019-01-01 | End: 2019-01-01 | Stop reason: HOSPADM

## 2019-01-01 RX ORDER — CALCIUM CARBONATE 500 MG/1
1000 TABLET, CHEWABLE ORAL EVERY 4 HOURS PRN
Status: DISCONTINUED | OUTPATIENT
Start: 2019-01-01 | End: 2019-01-01 | Stop reason: HOSPADM

## 2019-01-01 RX ORDER — PROPOFOL 10 MG/ML
INJECTION, EMULSION INTRAVENOUS PRN
Status: DISCONTINUED | OUTPATIENT
Start: 2019-01-01 | End: 2019-01-01

## 2019-01-01 RX ORDER — NICOTINE POLACRILEX 4 MG
15-30 LOZENGE BUCCAL
Status: DISCONTINUED | OUTPATIENT
Start: 2019-01-01 | End: 2019-01-01

## 2019-01-01 RX ORDER — ONDANSETRON 4 MG/1
4 TABLET, ORALLY DISINTEGRATING ORAL EVERY 6 HOURS PRN
Status: DISCONTINUED | OUTPATIENT
Start: 2019-01-01 | End: 2019-01-01 | Stop reason: HOSPADM

## 2019-01-01 RX ORDER — DEXTROSE MONOHYDRATE 25 G/50ML
25 INJECTION, SOLUTION INTRAVENOUS ONCE
Status: COMPLETED | OUTPATIENT
Start: 2019-01-01 | End: 2019-01-01

## 2019-01-01 RX ORDER — LIDOCAINE HYDROCHLORIDE 20 MG/ML
INJECTION, SOLUTION INFILTRATION; PERINEURAL PRN
Status: DISCONTINUED | OUTPATIENT
Start: 2019-01-01 | End: 2019-01-01

## 2019-01-01 RX ORDER — SODIUM CHLORIDE 9 MG/ML
INJECTION, SOLUTION INTRAVENOUS CONTINUOUS
Status: DISCONTINUED | OUTPATIENT
Start: 2019-01-01 | End: 2019-01-01 | Stop reason: HOSPADM

## 2019-01-01 RX ORDER — POTASSIUM CL/LIDO/0.9 % NACL 10MEQ/0.1L
10 INTRAVENOUS SOLUTION, PIGGYBACK (ML) INTRAVENOUS
Status: DISCONTINUED | OUTPATIENT
Start: 2019-01-01 | End: 2019-01-01 | Stop reason: HOSPADM

## 2019-01-01 RX ORDER — SODIUM CHLORIDE, SODIUM LACTATE, POTASSIUM CHLORIDE, CALCIUM CHLORIDE 600; 310; 30; 20 MG/100ML; MG/100ML; MG/100ML; MG/100ML
INJECTION, SOLUTION INTRAVENOUS CONTINUOUS
Status: DISCONTINUED | OUTPATIENT
Start: 2019-01-01 | End: 2019-01-01 | Stop reason: HOSPADM

## 2019-01-01 RX ORDER — DEXTROSE MONOHYDRATE 25 G/50ML
25-50 INJECTION, SOLUTION INTRAVENOUS
Status: DISCONTINUED | OUTPATIENT
Start: 2019-01-01 | End: 2019-01-01 | Stop reason: HOSPADM

## 2019-01-01 RX ORDER — MAGNESIUM HYDROXIDE 1200 MG/15ML
LIQUID ORAL PRN
Status: DISCONTINUED | OUTPATIENT
Start: 2019-01-01 | End: 2019-01-01 | Stop reason: HOSPADM

## 2019-01-01 RX ORDER — AZITHROMYCIN 500 MG/1
500 INJECTION, POWDER, LYOPHILIZED, FOR SOLUTION INTRAVENOUS EVERY 24 HOURS
Status: DISCONTINUED | OUTPATIENT
Start: 2019-01-01 | End: 2019-01-01

## 2019-01-01 RX ORDER — METOPROLOL SUCCINATE 25 MG/1
25 TABLET, EXTENDED RELEASE ORAL DAILY
COMMUNITY

## 2019-01-01 RX ORDER — OXYCODONE AND ACETAMINOPHEN 5; 325 MG/1; MG/1
1-2 TABLET ORAL EVERY 4 HOURS PRN
Status: DISCONTINUED | OUTPATIENT
Start: 2019-01-01 | End: 2019-01-01

## 2019-01-01 RX ORDER — FLUMAZENIL 0.1 MG/ML
0.2 INJECTION, SOLUTION INTRAVENOUS
Status: DISCONTINUED | OUTPATIENT
Start: 2019-01-01 | End: 2019-01-01 | Stop reason: HOSPADM

## 2019-01-01 RX ORDER — MULTIPLE VITAMINS W/ MINERALS TAB 9MG-400MCG
1 TAB ORAL DAILY
Status: DISCONTINUED | OUTPATIENT
Start: 2019-01-01 | End: 2019-01-01 | Stop reason: HOSPADM

## 2019-01-01 RX ORDER — METOPROLOL SUCCINATE 25 MG/1
25 TABLET, EXTENDED RELEASE ORAL DAILY
Status: DISCONTINUED | OUTPATIENT
Start: 2019-01-01 | End: 2019-01-01 | Stop reason: HOSPADM

## 2019-01-01 RX ORDER — FENTANYL CITRATE 50 UG/ML
25-50 INJECTION, SOLUTION INTRAMUSCULAR; INTRAVENOUS EVERY 5 MIN PRN
Status: DISCONTINUED | OUTPATIENT
Start: 2019-01-01 | End: 2019-01-01 | Stop reason: HOSPADM

## 2019-01-01 RX ORDER — LIDOCAINE 40 MG/G
CREAM TOPICAL
Status: CANCELLED | OUTPATIENT
Start: 2019-01-01

## 2019-01-01 RX ORDER — DEXTROSE MONOHYDRATE, SODIUM CHLORIDE, AND POTASSIUM CHLORIDE 50; 1.49; 4.5 G/1000ML; G/1000ML; G/1000ML
INJECTION, SOLUTION INTRAVENOUS CONTINUOUS
Status: DISCONTINUED | OUTPATIENT
Start: 2019-01-01 | End: 2019-01-01

## 2019-01-01 RX ORDER — LORAZEPAM 0.5 MG/1
0.5 TABLET ORAL EVERY 4 HOURS PRN
Status: DISCONTINUED | OUTPATIENT
Start: 2019-01-01 | End: 2019-01-01 | Stop reason: HOSPADM

## 2019-01-01 RX ORDER — ACETAMINOPHEN 325 MG/1
325-650 TABLET ORAL DAILY PRN
Status: ON HOLD | COMMUNITY
End: 2019-01-01

## 2019-01-01 RX ORDER — PIPERACILLIN SODIUM, TAZOBACTAM SODIUM 4; .5 G/20ML; G/20ML
4.5 INJECTION, POWDER, LYOPHILIZED, FOR SOLUTION INTRAVENOUS EVERY 6 HOURS
Status: DISCONTINUED | OUTPATIENT
Start: 2019-01-01 | End: 2019-01-01

## 2019-01-01 RX ORDER — OLANZAPINE 10 MG/1
10 TABLET ORAL DAILY PRN
Status: ON HOLD | COMMUNITY
End: 2019-01-01

## 2019-01-01 RX ORDER — HEPARIN SODIUM,PORCINE 10 UNIT/ML
5-10 VIAL (ML) INTRAVENOUS
Status: DISCONTINUED | OUTPATIENT
Start: 2019-01-01 | End: 2019-01-01 | Stop reason: HOSPADM

## 2019-01-01 RX ORDER — SENNOSIDES A AND B 8.6 MG/1
1-3 TABLET, FILM COATED ORAL 2 TIMES DAILY PRN
Qty: 30 TABLET | Refills: 0 | Status: SHIPPED | OUTPATIENT
Start: 2019-01-01 | End: 2019-01-01

## 2019-01-01 RX ORDER — PROCHLORPERAZINE MALEATE 10 MG
10 TABLET ORAL EVERY 6 HOURS PRN
COMMUNITY

## 2019-01-01 RX ORDER — POTASSIUM CHLORIDE 7.45 MG/ML
10 INJECTION INTRAVENOUS
Status: DISCONTINUED | OUTPATIENT
Start: 2019-01-01 | End: 2019-01-01 | Stop reason: HOSPADM

## 2019-01-01 RX ORDER — NALOXONE HYDROCHLORIDE 0.4 MG/ML
.1-.4 INJECTION, SOLUTION INTRAMUSCULAR; INTRAVENOUS; SUBCUTANEOUS
Status: DISCONTINUED | OUTPATIENT
Start: 2019-01-01 | End: 2019-01-01

## 2019-01-01 RX ORDER — DEXMEDETOMIDINE HYDROCHLORIDE 4 UG/ML
0.2-0.7 INJECTION, SOLUTION INTRAVENOUS CONTINUOUS
Status: DISCONTINUED | OUTPATIENT
Start: 2019-01-01 | End: 2019-01-01 | Stop reason: HOSPADM

## 2019-01-01 RX ORDER — LIDOCAINE 40 MG/G
CREAM TOPICAL
Status: DISCONTINUED | OUTPATIENT
Start: 2019-01-01 | End: 2019-01-01 | Stop reason: HOSPADM

## 2019-01-01 RX ORDER — EPHEDRINE SULFATE 50 MG/ML
INJECTION, SOLUTION INTRAMUSCULAR; INTRAVENOUS; SUBCUTANEOUS PRN
Status: DISCONTINUED | OUTPATIENT
Start: 2019-01-01 | End: 2019-01-01

## 2019-01-01 RX ORDER — CEFAZOLIN SODIUM 2 G/100ML
2 INJECTION, SOLUTION INTRAVENOUS
Status: COMPLETED | OUTPATIENT
Start: 2019-01-01 | End: 2019-01-01

## 2019-01-01 RX ORDER — VECURONIUM BROMIDE 1 MG/ML
INJECTION, POWDER, LYOPHILIZED, FOR SOLUTION INTRAVENOUS PRN
Status: DISCONTINUED | OUTPATIENT
Start: 2019-01-01 | End: 2019-01-01

## 2019-01-01 RX ORDER — NOREPINEPHRINE BITARTRATE 0.06 MG/ML
0.03-0.4 INJECTION, SOLUTION INTRAVENOUS CONTINUOUS
Status: DISCONTINUED | OUTPATIENT
Start: 2019-01-01 | End: 2019-01-01

## 2019-01-01 RX ORDER — LORAZEPAM 0.5 MG/1
0.5 TABLET ORAL 2 TIMES DAILY PRN
COMMUNITY

## 2019-01-01 RX ORDER — HYDROMORPHONE HYDROCHLORIDE 1 MG/ML
.3-.5 INJECTION, SOLUTION INTRAMUSCULAR; INTRAVENOUS; SUBCUTANEOUS EVERY 10 MIN PRN
Status: DISCONTINUED | OUTPATIENT
Start: 2019-01-01 | End: 2019-01-01 | Stop reason: HOSPADM

## 2019-01-01 RX ORDER — CALCIUM CHLORIDE 100 MG/ML
1 INJECTION INTRAVENOUS; INTRAVENTRICULAR ONCE
Status: COMPLETED | OUTPATIENT
Start: 2019-01-01 | End: 2019-01-01

## 2019-01-01 RX ORDER — LORAZEPAM 2 MG/ML
1-2 INJECTION INTRAMUSCULAR EVERY 10 MIN PRN
Status: DISCONTINUED | OUTPATIENT
Start: 2019-01-01 | End: 2019-01-01 | Stop reason: HOSPADM

## 2019-01-01 RX ORDER — ACETAMINOPHEN 325 MG/1
650 TABLET ORAL EVERY 4 HOURS PRN
Status: DISCONTINUED | OUTPATIENT
Start: 2019-01-01 | End: 2019-01-01 | Stop reason: HOSPADM

## 2019-01-01 RX ORDER — ACETAMINOPHEN 650 MG/1
650 SUPPOSITORY RECTAL EVERY 6 HOURS PRN
Status: DISCONTINUED | OUTPATIENT
Start: 2019-01-01 | End: 2019-01-01 | Stop reason: HOSPADM

## 2019-01-01 RX ORDER — NEOSTIGMINE METHYLSULFATE 1 MG/ML
VIAL (ML) INJECTION PRN
Status: DISCONTINUED | OUTPATIENT
Start: 2019-01-01 | End: 2019-01-01

## 2019-01-01 RX ORDER — DEXTROSE MONOHYDRATE 25 G/50ML
25-50 INJECTION, SOLUTION INTRAVENOUS
Status: DISCONTINUED | OUTPATIENT
Start: 2019-01-01 | End: 2019-01-01

## 2019-01-01 RX ORDER — ALBUMIN, HUMAN INJ 5% 5 %
12.5 SOLUTION INTRAVENOUS
Status: COMPLETED | OUTPATIENT
Start: 2019-01-01 | End: 2019-01-01

## 2019-01-01 RX ORDER — DEXTROSE MONOHYDRATE 25 G/50ML
25-50 INJECTION, SOLUTION INTRAVENOUS
Status: CANCELLED | OUTPATIENT
Start: 2019-01-01

## 2019-01-01 RX ORDER — LIDOCAINE HYDROCHLORIDE 10 MG/ML
1-30 INJECTION, SOLUTION EPIDURAL; INFILTRATION; INTRACAUDAL; PERINEURAL
Status: DISCONTINUED | OUTPATIENT
Start: 2019-01-01 | End: 2019-01-01 | Stop reason: HOSPADM

## 2019-01-01 RX ORDER — CEFAZOLIN SODIUM 2 G/100ML
2 INJECTION, SOLUTION INTRAVENOUS EVERY 6 HOURS
Status: COMPLETED | OUTPATIENT
Start: 2019-01-01 | End: 2019-01-01

## 2019-01-01 RX ORDER — MAGNESIUM OXIDE 400 MG/1
400 TABLET ORAL DAILY
COMMUNITY

## 2019-01-01 RX ORDER — FENTANYL CITRATE 50 UG/ML
25-50 INJECTION, SOLUTION INTRAMUSCULAR; INTRAVENOUS
Status: DISCONTINUED | OUTPATIENT
Start: 2019-01-01 | End: 2019-01-01 | Stop reason: HOSPADM

## 2019-01-01 RX ORDER — OXYCODONE HYDROCHLORIDE 5 MG/1
5-10 TABLET ORAL EVERY 4 HOURS PRN
Status: DISCONTINUED | OUTPATIENT
Start: 2019-01-01 | End: 2019-01-01 | Stop reason: HOSPADM

## 2019-01-01 RX ORDER — DEXAMETHASONE SODIUM PHOSPHATE 4 MG/ML
INJECTION, SOLUTION INTRA-ARTICULAR; INTRALESIONAL; INTRAMUSCULAR; INTRAVENOUS; SOFT TISSUE PRN
Status: DISCONTINUED | OUTPATIENT
Start: 2019-01-01 | End: 2019-01-01

## 2019-01-01 RX ORDER — AMOXICILLIN 250 MG
2 CAPSULE ORAL 2 TIMES DAILY
Status: DISCONTINUED | OUTPATIENT
Start: 2019-01-01 | End: 2019-01-01 | Stop reason: HOSPADM

## 2019-01-01 RX ORDER — OXYCODONE AND ACETAMINOPHEN 5; 325 MG/1; MG/1
1 TABLET ORAL EVERY 6 HOURS PRN
Qty: 30 TABLET | Refills: 0 | Status: SHIPPED | OUTPATIENT
Start: 2019-01-01 | End: 2019-01-01

## 2019-01-01 RX ORDER — GLYCOPYRROLATE 0.2 MG/ML
INJECTION, SOLUTION INTRAMUSCULAR; INTRAVENOUS PRN
Status: DISCONTINUED | OUTPATIENT
Start: 2019-01-01 | End: 2019-01-01

## 2019-01-01 RX ORDER — POTASSIUM CHLORIDE 1500 MG/1
20-40 TABLET, EXTENDED RELEASE ORAL
Status: DISCONTINUED | OUTPATIENT
Start: 2019-01-01 | End: 2019-01-01 | Stop reason: HOSPADM

## 2019-01-01 RX ORDER — POTASSIUM CHLORIDE 29.8 MG/ML
20 INJECTION INTRAVENOUS
Status: DISCONTINUED | OUTPATIENT
Start: 2019-01-01 | End: 2019-01-01 | Stop reason: HOSPADM

## 2019-01-01 RX ORDER — IOPAMIDOL 612 MG/ML
50 INJECTION, SOLUTION INTRAVASCULAR ONCE
Status: COMPLETED | OUTPATIENT
Start: 2019-01-01 | End: 2019-01-01

## 2019-01-01 RX ORDER — POTASSIUM CHLORIDE 750 MG/1
20 TABLET, EXTENDED RELEASE ORAL DAILY
Status: DISCONTINUED | OUTPATIENT
Start: 2019-01-01 | End: 2019-01-01

## 2019-01-01 RX ORDER — ONDANSETRON 2 MG/ML
4 INJECTION INTRAMUSCULAR; INTRAVENOUS EVERY 6 HOURS PRN
Status: DISCONTINUED | OUTPATIENT
Start: 2019-01-01 | End: 2019-01-01 | Stop reason: HOSPADM

## 2019-01-01 RX ORDER — HYDROMORPHONE HYDROCHLORIDE 1 MG/ML
0.2 INJECTION, SOLUTION INTRAMUSCULAR; INTRAVENOUS; SUBCUTANEOUS
Status: DISCONTINUED | OUTPATIENT
Start: 2019-01-01 | End: 2019-01-01

## 2019-01-01 RX ORDER — ONDANSETRON 4 MG/1
4 TABLET, ORALLY DISINTEGRATING ORAL EVERY 30 MIN PRN
Status: DISCONTINUED | OUTPATIENT
Start: 2019-01-01 | End: 2019-01-01 | Stop reason: HOSPADM

## 2019-01-01 RX ORDER — ONDANSETRON 2 MG/ML
INJECTION INTRAMUSCULAR; INTRAVENOUS PRN
Status: DISCONTINUED | OUTPATIENT
Start: 2019-01-01 | End: 2019-01-01

## 2019-01-01 RX ORDER — AMOXICILLIN 250 MG
2 CAPSULE ORAL 2 TIMES DAILY PRN
Status: DISCONTINUED | OUTPATIENT
Start: 2019-01-01 | End: 2019-01-01 | Stop reason: HOSPADM

## 2019-01-01 RX ORDER — ATROPINE SULFATE 10 MG/ML
1-2 SOLUTION/ DROPS OPHTHALMIC
Status: DISCONTINUED | OUTPATIENT
Start: 2019-01-01 | End: 2019-01-01 | Stop reason: HOSPADM

## 2019-01-01 RX ORDER — AMOXICILLIN 250 MG
1 CAPSULE ORAL 2 TIMES DAILY
Status: DISCONTINUED | OUTPATIENT
Start: 2019-01-01 | End: 2019-01-01 | Stop reason: HOSPADM

## 2019-01-01 RX ORDER — HYDROMORPHONE HYDROCHLORIDE 1 MG/ML
.3-.5 INJECTION, SOLUTION INTRAMUSCULAR; INTRAVENOUS; SUBCUTANEOUS EVERY 5 MIN PRN
Status: DISCONTINUED | OUTPATIENT
Start: 2019-01-01 | End: 2019-01-01 | Stop reason: HOSPADM

## 2019-01-01 RX ORDER — HYDROMORPHONE HYDROCHLORIDE 1 MG/ML
.3-.5 INJECTION, SOLUTION INTRAMUSCULAR; INTRAVENOUS; SUBCUTANEOUS EVERY 30 MIN PRN
Status: DISCONTINUED | OUTPATIENT
Start: 2019-01-01 | End: 2019-01-01 | Stop reason: HOSPADM

## 2019-01-01 RX ORDER — CALCIUM CHLORIDE 100 MG/ML
INJECTION INTRAVENOUS; INTRAVENTRICULAR
Status: COMPLETED
Start: 2019-01-01 | End: 2019-01-01

## 2019-01-01 RX ORDER — PHENYLEPHRINE HCL IN 0.9% NACL 50MG/250ML
0.5-6 PLASTIC BAG, INJECTION (ML) INTRAVENOUS CONTINUOUS
Status: DISCONTINUED | OUTPATIENT
Start: 2019-01-01 | End: 2019-01-01

## 2019-01-01 RX ORDER — CEFAZOLIN SODIUM 1 G/3ML
1 INJECTION, POWDER, FOR SOLUTION INTRAMUSCULAR; INTRAVENOUS SEE ADMIN INSTRUCTIONS
Status: DISCONTINUED | OUTPATIENT
Start: 2019-01-01 | End: 2019-01-01 | Stop reason: HOSPADM

## 2019-01-01 RX ORDER — POTASSIUM CHLORIDE 1.5 G/1.58G
20-40 POWDER, FOR SOLUTION ORAL
Status: DISCONTINUED | OUTPATIENT
Start: 2019-01-01 | End: 2019-01-01 | Stop reason: HOSPADM

## 2019-01-01 RX ORDER — NICOTINE POLACRILEX 4 MG
15-30 LOZENGE BUCCAL
Status: CANCELLED | OUTPATIENT
Start: 2019-01-01

## 2019-01-01 RX ORDER — ACETAMINOPHEN 325 MG/1
650 TABLET ORAL EVERY 6 HOURS PRN
Status: DISCONTINUED | OUTPATIENT
Start: 2019-01-01 | End: 2019-01-01 | Stop reason: HOSPADM

## 2019-01-01 RX ORDER — HEPARIN SODIUM (PORCINE) LOCK FLUSH IV SOLN 100 UNIT/ML 100 UNIT/ML
5 SOLUTION INTRAVENOUS
Status: DISCONTINUED | OUTPATIENT
Start: 2019-01-01 | End: 2019-01-01 | Stop reason: HOSPADM

## 2019-01-01 RX ORDER — DEXTROSE MONOHYDRATE 25 G/50ML
INJECTION, SOLUTION INTRAVENOUS
Status: COMPLETED
Start: 2019-01-01 | End: 2019-01-01

## 2019-01-01 RX ORDER — LORAZEPAM 0.5 MG/1
.5-1 TABLET ORAL 2 TIMES DAILY PRN
Status: DISCONTINUED | OUTPATIENT
Start: 2019-01-01 | End: 2019-01-01 | Stop reason: HOSPADM

## 2019-01-01 RX ORDER — PROCHLORPERAZINE MALEATE 10 MG
10 TABLET ORAL EVERY 6 HOURS PRN
Status: DISCONTINUED | OUTPATIENT
Start: 2019-01-01 | End: 2019-01-01 | Stop reason: HOSPADM

## 2019-01-01 RX ORDER — POTASSIUM CHLORIDE 20MEQ/15ML
20 LIQUID (ML) ORAL DAILY
Status: DISCONTINUED | OUTPATIENT
Start: 2019-01-01 | End: 2019-01-01 | Stop reason: HOSPADM

## 2019-01-01 RX ORDER — LIDOCAINE HYDROCHLORIDE 10 MG/ML
25 INJECTION, SOLUTION EPIDURAL; INFILTRATION; INTRACAUDAL; PERINEURAL ONCE
Status: COMPLETED | OUTPATIENT
Start: 2019-01-01 | End: 2019-01-01

## 2019-01-01 RX ORDER — LIDOCAINE HYDROCHLORIDE 10 MG/ML
INJECTION, SOLUTION INFILTRATION; PERINEURAL
Status: DISCONTINUED
Start: 2019-01-01 | End: 2019-01-01 | Stop reason: HOSPADM

## 2019-01-01 RX ORDER — ACETAMINOPHEN 500 MG
500 TABLET ORAL EVERY 6 HOURS PRN
Status: DISCONTINUED | OUTPATIENT
Start: 2019-01-01 | End: 2019-01-01 | Stop reason: HOSPADM

## 2019-01-01 RX ORDER — ALBUMIN, HUMAN INJ 5% 5 %
SOLUTION INTRAVENOUS CONTINUOUS PRN
Status: DISCONTINUED | OUTPATIENT
Start: 2019-01-01 | End: 2019-01-01

## 2019-01-01 RX ORDER — AMOXICILLIN 250 MG
1 CAPSULE ORAL 2 TIMES DAILY PRN
Status: DISCONTINUED | OUTPATIENT
Start: 2019-01-01 | End: 2019-01-01 | Stop reason: HOSPADM

## 2019-01-01 RX ORDER — ALBUMIN, HUMAN INJ 5% 5 %
12.5 SOLUTION INTRAVENOUS ONCE
Status: COMPLETED | OUTPATIENT
Start: 2019-01-01 | End: 2019-01-01

## 2019-01-01 RX ORDER — NAPROXEN 500 MG/1
500 TABLET ORAL 3 TIMES DAILY PRN
Status: DISCONTINUED | OUTPATIENT
Start: 2019-01-01 | End: 2019-01-01 | Stop reason: HOSPADM

## 2019-01-01 RX ORDER — ONDANSETRON 2 MG/ML
4 INJECTION INTRAMUSCULAR; INTRAVENOUS EVERY 30 MIN PRN
Status: DISCONTINUED | OUTPATIENT
Start: 2019-01-01 | End: 2019-01-01 | Stop reason: HOSPADM

## 2019-01-01 RX ORDER — OXYCODONE HYDROCHLORIDE 5 MG/1
5 TABLET ORAL EVERY 4 HOURS PRN
Status: DISCONTINUED | OUTPATIENT
Start: 2019-01-01 | End: 2019-01-01

## 2019-01-01 RX ORDER — ACETAMINOPHEN 325 MG/1
325-650 TABLET ORAL DAILY PRN
Status: DISCONTINUED | OUTPATIENT
Start: 2019-01-01 | End: 2019-01-01 | Stop reason: HOSPADM

## 2019-01-01 RX ORDER — GLYCOPYRROLATE 0.2 MG/ML
.1-.2 INJECTION, SOLUTION INTRAMUSCULAR; INTRAVENOUS EVERY 4 HOURS PRN
Status: DISCONTINUED | OUTPATIENT
Start: 2019-01-01 | End: 2019-01-01 | Stop reason: HOSPADM

## 2019-01-01 RX ORDER — HEPARIN SODIUM,PORCINE 10 UNIT/ML
2-5 VIAL (ML) INTRAVENOUS
Status: DISCONTINUED | OUTPATIENT
Start: 2019-01-01 | End: 2019-01-01

## 2019-01-01 RX ORDER — ALBUMIN (HUMAN) 12.5 G/50ML
50 SOLUTION INTRAVENOUS ONCE
Status: COMPLETED | OUTPATIENT
Start: 2019-01-01 | End: 2019-01-01

## 2019-01-01 RX ORDER — ONDANSETRON 2 MG/ML
INJECTION INTRAMUSCULAR; INTRAVENOUS
Status: COMPLETED
Start: 2019-01-01 | End: 2019-01-01

## 2019-01-01 RX ORDER — LORAZEPAM 0.5 MG/1
0.5 TABLET ORAL EVERY 4 HOURS PRN
Status: DISCONTINUED | OUTPATIENT
Start: 2019-01-01 | End: 2019-01-01

## 2019-01-01 RX ORDER — CALCIUM GLUCONATE 94 MG/ML
1 INJECTION, SOLUTION INTRAVENOUS ONCE
Status: DISCONTINUED | OUTPATIENT
Start: 2019-01-01 | End: 2019-01-01 | Stop reason: ALTCHOICE

## 2019-01-01 RX ORDER — VANCOMYCIN HYDROCHLORIDE 1 G/200ML
1000 INJECTION, SOLUTION INTRAVENOUS EVERY 8 HOURS
Status: DISCONTINUED | OUTPATIENT
Start: 2019-01-01 | End: 2019-01-01

## 2019-01-01 RX ORDER — DEXTROSE MONOHYDRATE 100 MG/ML
INJECTION, SOLUTION INTRAVENOUS CONTINUOUS
Status: ACTIVE | OUTPATIENT
Start: 2019-01-01 | End: 2019-01-01

## 2019-01-01 RX ADMIN — SODIUM CHLORIDE, POTASSIUM CHLORIDE, SODIUM LACTATE AND CALCIUM CHLORIDE: 600; 310; 30; 20 INJECTION, SOLUTION INTRAVENOUS at 16:01

## 2019-01-01 RX ADMIN — Medication 1 LOZENGE: at 22:17

## 2019-01-01 RX ADMIN — LIDOCAINE HYDROCHLORIDE 80 MG: 20 INJECTION, SOLUTION INFILTRATION; PERINEURAL at 15:16

## 2019-01-01 RX ADMIN — HYDROMORPHONE HYDROCHLORIDE 0.5 MG: 1 INJECTION, SOLUTION INTRAMUSCULAR; INTRAVENOUS; SUBCUTANEOUS at 18:08

## 2019-01-01 RX ADMIN — DEXTROSE MONOHYDRATE 25 G: 500 INJECTION PARENTERAL at 15:05

## 2019-01-01 RX ADMIN — Medication 25 MEQ/HR: at 06:50

## 2019-01-01 RX ADMIN — NOREPINEPHRINE BITARTRATE 0.3 MCG/KG/MIN: 1 INJECTION INTRAVENOUS at 22:39

## 2019-01-01 RX ADMIN — POTASSIUM CHLORIDE 40 MEQ: 1.5 POWDER, FOR SOLUTION ORAL at 11:36

## 2019-01-01 RX ADMIN — PIPERACILLIN SODIUM AND TAZOBACTAM SODIUM 4.5 G: 4; .5 INJECTION, POWDER, LYOPHILIZED, FOR SOLUTION INTRAVENOUS at 21:40

## 2019-01-01 RX ADMIN — DEXTROSE MONOHYDRATE: 100 INJECTION, SOLUTION INTRAVENOUS at 15:14

## 2019-01-01 RX ADMIN — AZITHROMYCIN MONOHYDRATE 500 MG: 500 INJECTION, POWDER, LYOPHILIZED, FOR SOLUTION INTRAVENOUS at 04:57

## 2019-01-01 RX ADMIN — MIDAZOLAM 1 MG: 1 INJECTION INTRAMUSCULAR; INTRAVENOUS at 08:25

## 2019-01-01 RX ADMIN — PROCHLORPERAZINE EDISYLATE 10 MG: 5 INJECTION INTRAMUSCULAR; INTRAVENOUS at 08:52

## 2019-01-01 RX ADMIN — ACETAMINOPHEN 325 MG: 325 TABLET, FILM COATED ORAL at 10:26

## 2019-01-01 RX ADMIN — PANTOPRAZOLE SODIUM 40 MG: 40 INJECTION, POWDER, LYOPHILIZED, FOR SOLUTION INTRAVENOUS at 08:31

## 2019-01-01 RX ADMIN — PIPERACILLIN SODIUM AND TAZOBACTAM SODIUM 4.5 G: 4; .5 INJECTION, POWDER, LYOPHILIZED, FOR SOLUTION INTRAVENOUS at 14:48

## 2019-01-01 RX ADMIN — SODIUM CHLORIDE, PRESERVATIVE FREE 5 ML: 5 INJECTION INTRAVENOUS at 06:03

## 2019-01-01 RX ADMIN — IOPAMIDOL 20 ML: 612 INJECTION, SOLUTION INTRAVENOUS at 09:04

## 2019-01-01 RX ADMIN — ENOXAPARIN SODIUM 80 MG: 80 INJECTION SUBCUTANEOUS at 11:17

## 2019-01-01 RX ADMIN — LIDOCAINE HYDROCHLORIDE 20 ML: 10 INJECTION, SOLUTION EPIDURAL; INFILTRATION; INTRACAUDAL; PERINEURAL at 08:12

## 2019-01-01 RX ADMIN — PIPERACILLIN SODIUM AND TAZOBACTAM SODIUM 4.5 G: 4; .5 INJECTION, POWDER, LYOPHILIZED, FOR SOLUTION INTRAVENOUS at 03:08

## 2019-01-01 RX ADMIN — FENTANYL CITRATE 50 MCG: 50 INJECTION, SOLUTION INTRAMUSCULAR; INTRAVENOUS at 17:57

## 2019-01-01 RX ADMIN — PIPERACILLIN SODIUM AND TAZOBACTAM SODIUM 4.5 G: 4; .5 INJECTION, POWDER, LYOPHILIZED, FOR SOLUTION INTRAVENOUS at 21:54

## 2019-01-01 RX ADMIN — FENTANYL CITRATE 50 MCG: 50 INJECTION, SOLUTION INTRAMUSCULAR; INTRAVENOUS at 08:45

## 2019-01-01 RX ADMIN — LIDOCAINE HYDROCHLORIDE 1 ML: 10 INJECTION, SOLUTION EPIDURAL; INFILTRATION; INTRACAUDAL; PERINEURAL at 10:41

## 2019-01-01 RX ADMIN — CALCIUM GLUCONATE 4 G: 98 INJECTION, SOLUTION INTRAVENOUS at 16:01

## 2019-01-01 RX ADMIN — ROCURONIUM BROMIDE 50 MG: 10 INJECTION INTRAVENOUS at 15:16

## 2019-01-01 RX ADMIN — PIPERACILLIN SODIUM AND TAZOBACTAM SODIUM 4.5 G: 4; .5 INJECTION, POWDER, LYOPHILIZED, FOR SOLUTION INTRAVENOUS at 14:26

## 2019-01-01 RX ADMIN — FENTANYL CITRATE 50 MCG: 50 INJECTION, SOLUTION INTRAMUSCULAR; INTRAVENOUS at 10:08

## 2019-01-01 RX ADMIN — HYDROCORTISONE SODIUM SUCCINATE 100 MG: 100 INJECTION, POWDER, FOR SOLUTION INTRAMUSCULAR; INTRAVENOUS at 16:31

## 2019-01-01 RX ADMIN — Medication 1 MCG/KG/MIN: at 11:58

## 2019-01-01 RX ADMIN — LORAZEPAM 0.5 MG: 0.5 TABLET ORAL at 00:24

## 2019-01-01 RX ADMIN — HUMAN ALBUMIN MICROSPHERES AND PERFLUTREN 9 ML: 10; .22 INJECTION, SOLUTION INTRAVENOUS at 15:53

## 2019-01-01 RX ADMIN — ONDANSETRON 4 MG: 2 INJECTION INTRAMUSCULAR; INTRAVENOUS at 23:57

## 2019-01-01 RX ADMIN — Medication 100 MEQ: at 15:26

## 2019-01-01 RX ADMIN — SENNOSIDES AND DOCUSATE SODIUM 2 TABLET: 8.6; 5 TABLET ORAL at 07:54

## 2019-01-01 RX ADMIN — MULTIPLE VITAMINS W/ MINERALS TAB 1 TABLET: TAB at 13:57

## 2019-01-01 RX ADMIN — LORAZEPAM 0.5 MG: 0.5 TABLET ORAL at 22:31

## 2019-01-01 RX ADMIN — Medication 25 MEQ/HR: at 16:08

## 2019-01-01 RX ADMIN — NEOSTIGMINE METHYLSULFATE 5 MG: 1 INJECTION, SOLUTION INTRAVENOUS at 17:41

## 2019-01-01 RX ADMIN — DEXTROSE MONOHYDRATE 50 ML: 500 INJECTION PARENTERAL at 22:58

## 2019-01-01 RX ADMIN — HYDROMORPHONE HYDROCHLORIDE 0.5 MG: 1 INJECTION, SOLUTION INTRAMUSCULAR; INTRAVENOUS; SUBCUTANEOUS at 08:31

## 2019-01-01 RX ADMIN — Medication 25 MEQ/HR: at 02:51

## 2019-01-01 RX ADMIN — ONDANSETRON 4 MG: 2 INJECTION INTRAMUSCULAR; INTRAVENOUS at 21:07

## 2019-01-01 RX ADMIN — SENNOSIDES AND DOCUSATE SODIUM 2 TABLET: 8.6; 5 TABLET ORAL at 08:56

## 2019-01-01 RX ADMIN — PIPERACILLIN SODIUM AND TAZOBACTAM SODIUM 4.5 G: 4; .5 INJECTION, POWDER, LYOPHILIZED, FOR SOLUTION INTRAVENOUS at 08:38

## 2019-01-01 RX ADMIN — DEXTROSE MONOHYDRATE 50 ML: 500 INJECTION PARENTERAL at 22:35

## 2019-01-01 RX ADMIN — CALCIUM CARBONATE (ANTACID) CHEW TAB 500 MG 1000 MG: 500 CHEW TAB at 17:14

## 2019-01-01 RX ADMIN — ALBUMIN HUMAN 12.5 G: 0.05 INJECTION, SOLUTION INTRAVENOUS at 07:54

## 2019-01-01 RX ADMIN — POTASSIUM CHLORIDE, DEXTROSE MONOHYDRATE AND SODIUM CHLORIDE: 150; 5; 450 INJECTION, SOLUTION INTRAVENOUS at 03:21

## 2019-01-01 RX ADMIN — PIPERACILLIN SODIUM AND TAZOBACTAM SODIUM 4.5 G: 4; .5 INJECTION, POWDER, LYOPHILIZED, FOR SOLUTION INTRAVENOUS at 03:07

## 2019-01-01 RX ADMIN — HYDROMORPHONE HYDROCHLORIDE 0.2 MG: 1 INJECTION, SOLUTION INTRAMUSCULAR; INTRAVENOUS; SUBCUTANEOUS at 18:01

## 2019-01-01 RX ADMIN — Medication 5 MG: at 16:25

## 2019-01-01 RX ADMIN — ONDANSETRON 4 MG: 4 TABLET, ORALLY DISINTEGRATING ORAL at 04:11

## 2019-01-01 RX ADMIN — VANCOMYCIN HYDROCHLORIDE 1000 MG: 1 INJECTION, SOLUTION INTRAVENOUS at 16:31

## 2019-01-01 RX ADMIN — HYDROMORPHONE HYDROCHLORIDE 0.2 MG: 1 INJECTION, SOLUTION INTRAMUSCULAR; INTRAVENOUS; SUBCUTANEOUS at 05:16

## 2019-01-01 RX ADMIN — SERTRALINE HYDROCHLORIDE 50 MG: 50 TABLET ORAL at 08:01

## 2019-01-01 RX ADMIN — POTASSIUM CHLORIDE 20 MEQ: 20 SOLUTION ORAL at 11:36

## 2019-01-01 RX ADMIN — GLYCOPYRROLATE 0.8 MG: 0.2 INJECTION, SOLUTION INTRAMUSCULAR; INTRAVENOUS at 17:41

## 2019-01-01 RX ADMIN — SERTRALINE HYDROCHLORIDE 50 MG: 50 TABLET ORAL at 09:28

## 2019-01-01 RX ADMIN — PIPERACILLIN SODIUM AND TAZOBACTAM SODIUM 4.5 G: 4; .5 INJECTION, POWDER, LYOPHILIZED, FOR SOLUTION INTRAVENOUS at 14:38

## 2019-01-01 RX ADMIN — METOPROLOL SUCCINATE 25 MG: 25 TABLET, EXTENDED RELEASE ORAL at 09:35

## 2019-01-01 RX ADMIN — ANGIOTENSIN II 20 NG/KG/MIN: 2.5 INJECTION INTRAVENOUS at 14:53

## 2019-01-01 RX ADMIN — ALBUMIN HUMAN 12.5 G: 0.05 INJECTION, SOLUTION INTRAVENOUS at 14:16

## 2019-01-01 RX ADMIN — DEXAMETHASONE SODIUM PHOSPHATE 4 MG: 4 INJECTION, SOLUTION INTRA-ARTICULAR; INTRALESIONAL; INTRAMUSCULAR; INTRAVENOUS; SOFT TISSUE at 15:27

## 2019-01-01 RX ADMIN — METOPROLOL SUCCINATE 25 MG: 25 TABLET, EXTENDED RELEASE ORAL at 08:01

## 2019-01-01 RX ADMIN — SERTRALINE HYDROCHLORIDE 50 MG: 50 TABLET ORAL at 07:44

## 2019-01-01 RX ADMIN — HYDROCORTISONE SODIUM SUCCINATE 100 MG: 100 INJECTION, POWDER, FOR SOLUTION INTRAMUSCULAR; INTRAVENOUS at 00:05

## 2019-01-01 RX ADMIN — PROPOFOL 200 MG: 10 INJECTION, EMULSION INTRAVENOUS at 15:16

## 2019-01-01 RX ADMIN — CALCIUM CHLORIDE 1 G: 100 INJECTION INTRAVENOUS; INTRAVENTRICULAR at 15:42

## 2019-01-01 RX ADMIN — ANGIOTENSIN II 20 NG/KG/MIN: 2.5 INJECTION INTRAVENOUS at 19:27

## 2019-01-01 RX ADMIN — ONDANSETRON 4 MG: 4 TABLET, ORALLY DISINTEGRATING ORAL at 18:36

## 2019-01-01 RX ADMIN — SENNOSIDES AND DOCUSATE SODIUM 1 TABLET: 8.6; 5 TABLET ORAL at 09:28

## 2019-01-01 RX ADMIN — PIPERACILLIN SODIUM AND TAZOBACTAM SODIUM 4.5 G: 4; .5 INJECTION, POWDER, LYOPHILIZED, FOR SOLUTION INTRAVENOUS at 00:59

## 2019-01-01 RX ADMIN — HYDROMORPHONE HYDROCHLORIDE 0.2 MG: 1 INJECTION, SOLUTION INTRAMUSCULAR; INTRAVENOUS; SUBCUTANEOUS at 01:35

## 2019-01-01 RX ADMIN — ENOXAPARIN SODIUM 60 MG: 60 INJECTION SUBCUTANEOUS at 11:44

## 2019-01-01 RX ADMIN — HEPARIN SODIUM 10000 UNITS: 1000 INJECTION, SOLUTION INTRAVENOUS; SUBCUTANEOUS at 08:31

## 2019-01-01 RX ADMIN — SENNOSIDES AND DOCUSATE SODIUM 1 TABLET: 8.6; 5 TABLET ORAL at 21:58

## 2019-01-01 RX ADMIN — SODIUM CHLORIDE: 9 INJECTION, SOLUTION INTRAVENOUS at 07:12

## 2019-01-01 RX ADMIN — FENTANYL CITRATE 100 MCG: 50 INJECTION, SOLUTION INTRAMUSCULAR; INTRAVENOUS at 15:16

## 2019-01-01 RX ADMIN — HYDROCORTISONE SODIUM SUCCINATE 100 MG: 100 INJECTION, POWDER, FOR SOLUTION INTRAMUSCULAR; INTRAVENOUS at 08:38

## 2019-01-01 RX ADMIN — HEPARIN, PORCINE (PF) 10 UNIT/ML INTRAVENOUS SYRINGE 5 ML: at 03:46

## 2019-01-01 RX ADMIN — VECURONIUM BROMIDE 2 MG: 1 INJECTION, POWDER, LYOPHILIZED, FOR SOLUTION INTRAVENOUS at 16:16

## 2019-01-01 RX ADMIN — AZITHROMYCIN MONOHYDRATE 500 MG: 500 INJECTION, POWDER, LYOPHILIZED, FOR SOLUTION INTRAVENOUS at 03:17

## 2019-01-01 RX ADMIN — OXYCODONE HYDROCHLORIDE 5 MG: 5 TABLET ORAL at 15:35

## 2019-01-01 RX ADMIN — ENOXAPARIN SODIUM 40 MG: 40 INJECTION SUBCUTANEOUS at 12:12

## 2019-01-01 RX ADMIN — PIPERACILLIN SODIUM AND TAZOBACTAM SODIUM 4.5 G: 4; .5 INJECTION, POWDER, LYOPHILIZED, FOR SOLUTION INTRAVENOUS at 15:15

## 2019-01-01 RX ADMIN — Medication 10 MG: at 15:53

## 2019-01-01 RX ADMIN — HEPARIN, PORCINE (PF) 10 UNIT/ML INTRAVENOUS SYRINGE 5 ML: at 23:28

## 2019-01-01 RX ADMIN — SODIUM CHLORIDE, POTASSIUM CHLORIDE, SODIUM LACTATE AND CALCIUM CHLORIDE: 600; 310; 30; 20 INJECTION, SOLUTION INTRAVENOUS at 09:26

## 2019-01-01 RX ADMIN — FENTANYL CITRATE 50 MCG: 50 INJECTION, SOLUTION INTRAMUSCULAR; INTRAVENOUS at 17:30

## 2019-01-01 RX ADMIN — SERTRALINE HYDROCHLORIDE 50 MG: 50 TABLET ORAL at 07:32

## 2019-01-01 RX ADMIN — NOREPINEPHRINE BITARTRATE 0.37 MCG/KG/MIN: 1 INJECTION INTRAVENOUS at 02:58

## 2019-01-01 RX ADMIN — SENNOSIDES AND DOCUSATE SODIUM 2 TABLET: 8.6; 5 TABLET ORAL at 20:37

## 2019-01-01 RX ADMIN — PANTOPRAZOLE SODIUM 40 MG: 40 INJECTION, POWDER, LYOPHILIZED, FOR SOLUTION INTRAVENOUS at 08:23

## 2019-01-01 RX ADMIN — HYDROMORPHONE HYDROCHLORIDE 0.5 MG: 1 INJECTION, SOLUTION INTRAMUSCULAR; INTRAVENOUS; SUBCUTANEOUS at 18:20

## 2019-01-01 RX ADMIN — Medication 5 MG: at 16:22

## 2019-01-01 RX ADMIN — DEXMEDETOMIDINE 0.2 MCG/KG/HR: 100 INJECTION, SOLUTION, CONCENTRATE INTRAVENOUS at 08:31

## 2019-01-01 RX ADMIN — Medication 5 MG: at 16:23

## 2019-01-01 RX ADMIN — Medication 25 MEQ/HR: at 23:22

## 2019-01-01 RX ADMIN — AZITHROMYCIN MONOHYDRATE 500 MG: 500 INJECTION, POWDER, LYOPHILIZED, FOR SOLUTION INTRAVENOUS at 23:30

## 2019-01-01 RX ADMIN — ALBUMIN HUMAN: 0.05 INJECTION, SOLUTION INTRAVENOUS at 15:58

## 2019-01-01 RX ADMIN — PROCHLORPERAZINE MALEATE 10 MG: 10 TABLET, FILM COATED ORAL at 18:46

## 2019-01-01 RX ADMIN — MIDAZOLAM 2 MG: 1 INJECTION INTRAMUSCULAR; INTRAVENOUS at 15:10

## 2019-01-01 RX ADMIN — MIDAZOLAM 1 MG: 1 INJECTION INTRAMUSCULAR; INTRAVENOUS at 08:44

## 2019-01-01 RX ADMIN — MIDAZOLAM HYDROCHLORIDE 1 MG: 1 INJECTION, SOLUTION INTRAMUSCULAR; INTRAVENOUS at 10:13

## 2019-01-01 RX ADMIN — POTASSIUM CHLORIDE, DEXTROSE MONOHYDRATE AND SODIUM CHLORIDE: 150; 5; 450 INJECTION, SOLUTION INTRAVENOUS at 18:09

## 2019-01-01 RX ADMIN — SERTRALINE HYDROCHLORIDE 50 MG: 50 TABLET ORAL at 20:00

## 2019-01-01 RX ADMIN — PIPERACILLIN SODIUM AND TAZOBACTAM SODIUM 4.5 G: 4; .5 INJECTION, POWDER, LYOPHILIZED, FOR SOLUTION INTRAVENOUS at 08:23

## 2019-01-01 RX ADMIN — LIDOCAINE HYDROCHLORIDE 7 ML: 10 INJECTION, SOLUTION INFILTRATION; PERINEURAL at 08:48

## 2019-01-01 RX ADMIN — SODIUM CHLORIDE, PRESERVATIVE FREE 5 ML: 5 INJECTION INTRAVENOUS at 11:43

## 2019-01-01 RX ADMIN — VANCOMYCIN HYDROCHLORIDE 1000 MG: 1 INJECTION, SOLUTION INTRAVENOUS at 08:58

## 2019-01-01 RX ADMIN — LORAZEPAM 0.5 MG: 0.5 TABLET ORAL at 23:37

## 2019-01-01 RX ADMIN — ONDANSETRON 4 MG: 2 INJECTION INTRAMUSCULAR; INTRAVENOUS at 17:23

## 2019-01-01 RX ADMIN — SODIUM CHLORIDE, POTASSIUM CHLORIDE, SODIUM LACTATE AND CALCIUM CHLORIDE: 600; 310; 30; 20 INJECTION, SOLUTION INTRAVENOUS at 16:39

## 2019-01-01 RX ADMIN — AZITHROMYCIN MONOHYDRATE 500 MG: 500 INJECTION, POWDER, LYOPHILIZED, FOR SOLUTION INTRAVENOUS at 03:09

## 2019-01-01 RX ADMIN — DEXTROSE MONOHYDRATE 50 ML: 500 INJECTION PARENTERAL at 06:09

## 2019-01-01 RX ADMIN — VECURONIUM BROMIDE 2 MG: 1 INJECTION, POWDER, LYOPHILIZED, FOR SOLUTION INTRAVENOUS at 16:54

## 2019-01-01 RX ADMIN — POTASSIUM CHLORIDE, DEXTROSE MONOHYDRATE AND SODIUM CHLORIDE: 150; 5; 450 INJECTION, SOLUTION INTRAVENOUS at 20:00

## 2019-01-01 RX ADMIN — SODIUM CHLORIDE, POTASSIUM CHLORIDE, SODIUM LACTATE AND CALCIUM CHLORIDE: 600; 310; 30; 20 INJECTION, SOLUTION INTRAVENOUS at 11:50

## 2019-01-01 RX ADMIN — SODIUM BICARBONATE 100 MEQ: 84 INJECTION, SOLUTION INTRAVENOUS at 15:26

## 2019-01-01 RX ADMIN — ENOXAPARIN SODIUM 90 MG: 100 INJECTION SUBCUTANEOUS at 12:51

## 2019-01-01 RX ADMIN — FENTANYL CITRATE 50 MCG: 50 INJECTION, SOLUTION INTRAMUSCULAR; INTRAVENOUS at 08:24

## 2019-01-01 RX ADMIN — SODIUM CHLORIDE, POTASSIUM CHLORIDE, SODIUM LACTATE AND CALCIUM CHLORIDE: 600; 310; 30; 20 INJECTION, SOLUTION INTRAVENOUS at 05:01

## 2019-01-01 RX ADMIN — SODIUM CHLORIDE, POTASSIUM CHLORIDE, SODIUM LACTATE AND CALCIUM CHLORIDE: 600; 310; 30; 20 INJECTION, SOLUTION INTRAVENOUS at 05:07

## 2019-01-01 RX ADMIN — Medication 5000 UNITS: at 22:26

## 2019-01-01 RX ADMIN — POTASSIUM CHLORIDE 20 MEQ: 20 SOLUTION ORAL at 09:35

## 2019-01-01 RX ADMIN — CEFAZOLIN SODIUM 1 G: 2 INJECTION, SOLUTION INTRAVENOUS at 17:16

## 2019-01-01 RX ADMIN — SODIUM CHLORIDE, PRESERVATIVE FREE 5 ML: 5 INJECTION INTRAVENOUS at 18:26

## 2019-01-01 RX ADMIN — POTASSIUM CHLORIDE, DEXTROSE MONOHYDRATE AND SODIUM CHLORIDE: 150; 5; 450 INJECTION, SOLUTION INTRAVENOUS at 13:16

## 2019-01-01 RX ADMIN — MULTIPLE VITAMINS W/ MINERALS TAB 1 TABLET: TAB at 08:01

## 2019-01-01 RX ADMIN — LIDOCAINE HYDROCHLORIDE 7 ML: 10 INJECTION, SOLUTION INFILTRATION; PERINEURAL at 10:11

## 2019-01-01 RX ADMIN — HYDROMORPHONE HYDROCHLORIDE 0.5 MG: 1 INJECTION, SOLUTION INTRAMUSCULAR; INTRAVENOUS; SUBCUTANEOUS at 18:45

## 2019-01-01 RX ADMIN — PIPERACILLIN SODIUM AND TAZOBACTAM SODIUM 4.5 G: 4; .5 INJECTION, POWDER, LYOPHILIZED, FOR SOLUTION INTRAVENOUS at 08:31

## 2019-01-01 RX ADMIN — ENOXAPARIN SODIUM 70 MG: 80 INJECTION SUBCUTANEOUS at 09:28

## 2019-01-01 RX ADMIN — ALBUMIN HUMAN: 0.05 INJECTION, SOLUTION INTRAVENOUS at 16:01

## 2019-01-01 RX ADMIN — PIPERACILLIN SODIUM AND TAZOBACTAM SODIUM 4.5 G: 4; .5 INJECTION, POWDER, LYOPHILIZED, FOR SOLUTION INTRAVENOUS at 02:12

## 2019-01-01 RX ADMIN — SERTRALINE HYDROCHLORIDE 50 MG: 50 TABLET ORAL at 09:35

## 2019-01-01 RX ADMIN — AZITHROMYCIN MONOHYDRATE 500 MG: 500 INJECTION, POWDER, LYOPHILIZED, FOR SOLUTION INTRAVENOUS at 05:50

## 2019-01-01 RX ADMIN — CEFAZOLIN SODIUM 2 G: 2 INJECTION, SOLUTION INTRAVENOUS at 20:26

## 2019-01-01 RX ADMIN — SERTRALINE HYDROCHLORIDE 50 MG: 50 TABLET ORAL at 07:54

## 2019-01-01 RX ADMIN — CALCIUM GLUCONATE 2 G: 98 INJECTION, SOLUTION INTRAVENOUS at 05:20

## 2019-01-01 RX ADMIN — ENOXAPARIN SODIUM 70 MG: 80 INJECTION SUBCUTANEOUS at 22:14

## 2019-01-01 RX ADMIN — CEFAZOLIN SODIUM 2 G: 2 INJECTION, SOLUTION INTRAVENOUS at 03:27

## 2019-01-01 RX ADMIN — IOPAMIDOL 20 ML: 612 INJECTION, SOLUTION INTRAVENOUS at 10:17

## 2019-01-01 RX ADMIN — POTASSIUM CHLORIDE 20 MEQ: 1.5 POWDER, FOR SOLUTION ORAL at 13:57

## 2019-01-01 RX ADMIN — SODIUM CHLORIDE, PRESERVATIVE FREE 5 ML: 5 INJECTION INTRAVENOUS at 19:10

## 2019-01-01 RX ADMIN — PIPERACILLIN SODIUM AND TAZOBACTAM SODIUM 4.5 G: 4; .5 INJECTION, POWDER, LYOPHILIZED, FOR SOLUTION INTRAVENOUS at 06:30

## 2019-01-01 RX ADMIN — ALBUMIN HUMAN 50 G: 0.25 SOLUTION INTRAVENOUS at 15:15

## 2019-01-01 RX ADMIN — HYDROCORTISONE SODIUM SUCCINATE 100 MG: 100 INJECTION, POWDER, FOR SOLUTION INTRAMUSCULAR; INTRAVENOUS at 15:42

## 2019-01-01 RX ADMIN — Medication: at 18:22

## 2019-01-01 RX ADMIN — PHENYLEPHRINE HYDROCHLORIDE 100 MCG: 10 INJECTION, SOLUTION INTRAMUSCULAR; INTRAVENOUS; SUBCUTANEOUS at 17:06

## 2019-01-01 RX ADMIN — SODIUM CHLORIDE, POTASSIUM CHLORIDE, SODIUM LACTATE AND CALCIUM CHLORIDE: 600; 310; 30; 20 INJECTION, SOLUTION INTRAVENOUS at 08:59

## 2019-01-01 RX ADMIN — PIPERACILLIN SODIUM AND TAZOBACTAM SODIUM 4.5 G: 4; .5 INJECTION, POWDER, LYOPHILIZED, FOR SOLUTION INTRAVENOUS at 05:15

## 2019-01-01 RX ADMIN — PANTOPRAZOLE SODIUM 40 MG: 40 INJECTION, POWDER, LYOPHILIZED, FOR SOLUTION INTRAVENOUS at 08:39

## 2019-01-01 RX ADMIN — HEPARIN SODIUM 1100 UNITS/HR: 10000 INJECTION, SOLUTION INTRAVENOUS at 22:27

## 2019-01-01 RX ADMIN — Medication 25 MEQ/HR: at 19:27

## 2019-01-01 RX ADMIN — SODIUM CHLORIDE, POTASSIUM CHLORIDE, SODIUM LACTATE AND CALCIUM CHLORIDE: 600; 310; 30; 20 INJECTION, SOLUTION INTRAVENOUS at 19:05

## 2019-01-01 RX ADMIN — VASOPRESSIN 2.4 UNITS/HR: 20 INJECTION INTRAVENOUS at 09:17

## 2019-01-01 RX ADMIN — SODIUM BICARBONATE 50 MEQ: 84 INJECTION, SOLUTION INTRAVENOUS at 15:14

## 2019-01-01 RX ADMIN — SENNOSIDES AND DOCUSATE SODIUM 2 TABLET: 8.6; 5 TABLET ORAL at 07:44

## 2019-01-01 RX ADMIN — PANTOPRAZOLE SODIUM 40 MG: 40 INJECTION, POWDER, LYOPHILIZED, FOR SOLUTION INTRAVENOUS at 14:26

## 2019-01-01 RX ADMIN — ENOXAPARIN SODIUM 70 MG: 80 INJECTION SUBCUTANEOUS at 07:57

## 2019-01-01 RX ADMIN — PROCHLORPERAZINE EDISYLATE 10 MG: 5 INJECTION INTRAMUSCULAR; INTRAVENOUS at 03:46

## 2019-01-01 RX ADMIN — Medication 0.5 MCG/KG/MIN: at 02:11

## 2019-01-01 RX ADMIN — SENNOSIDES AND DOCUSATE SODIUM 2 TABLET: 8.6; 5 TABLET ORAL at 20:01

## 2019-01-01 RX ADMIN — SODIUM CHLORIDE, POTASSIUM CHLORIDE, SODIUM LACTATE AND CALCIUM CHLORIDE: 600; 310; 30; 20 INJECTION, SOLUTION INTRAVENOUS at 02:00

## 2019-01-01 RX ADMIN — PIPERACILLIN SODIUM AND TAZOBACTAM SODIUM 4.5 G: 4; .5 INJECTION, POWDER, LYOPHILIZED, FOR SOLUTION INTRAVENOUS at 19:41

## 2019-01-01 RX ADMIN — SODIUM CHLORIDE 7 UNITS: 9 INJECTION, SOLUTION INTRAVENOUS at 15:05

## 2019-01-01 RX ADMIN — HYDROCORTISONE SODIUM SUCCINATE 100 MG: 100 INJECTION, POWDER, FOR SOLUTION INTRAMUSCULAR; INTRAVENOUS at 00:02

## 2019-01-01 RX ADMIN — POTASSIUM CHLORIDE, DEXTROSE MONOHYDRATE AND SODIUM CHLORIDE: 150; 5; 450 INJECTION, SOLUTION INTRAVENOUS at 07:28

## 2019-01-01 RX ADMIN — VANCOMYCIN HYDROCHLORIDE 1000 MG: 1 INJECTION, SOLUTION INTRAVENOUS at 23:58

## 2019-01-01 RX ADMIN — Medication 5 ML: at 22:14

## 2019-01-01 RX ADMIN — POTASSIUM CHLORIDE 20 MEQ: 20 SOLUTION ORAL at 08:01

## 2019-01-01 RX ADMIN — CEFAZOLIN SODIUM 2 G: 2 INJECTION, SOLUTION INTRAVENOUS at 15:20

## 2019-01-01 RX ADMIN — LORAZEPAM 2 MG: 2 INJECTION INTRAMUSCULAR; INTRAVENOUS at 08:42

## 2019-01-01 RX ADMIN — FENTANYL CITRATE 50 MCG: 50 INJECTION, SOLUTION INTRAMUSCULAR; INTRAVENOUS at 10:13

## 2019-01-01 RX ADMIN — ONDANSETRON 4 MG: 4 TABLET, ORALLY DISINTEGRATING ORAL at 22:45

## 2019-01-01 RX ADMIN — VASOPRESSIN 2.4 UNITS/HR: 20 INJECTION INTRAVENOUS at 03:18

## 2019-01-01 RX ADMIN — LIDOCAINE HYDROCHLORIDE 0.2 ML: 10 INJECTION, SOLUTION EPIDURAL; INFILTRATION; INTRACAUDAL; PERINEURAL at 09:26

## 2019-01-01 RX ADMIN — HYDROCORTISONE SODIUM SUCCINATE 100 MG: 100 INJECTION, POWDER, FOR SOLUTION INTRAMUSCULAR; INTRAVENOUS at 08:58

## 2019-01-01 RX ADMIN — MIDAZOLAM HYDROCHLORIDE 1 MG: 1 INJECTION, SOLUTION INTRAMUSCULAR; INTRAVENOUS at 10:07

## 2019-01-01 RX ADMIN — VASOPRESSIN 2.4 UNITS/HR: 20 INJECTION INTRAVENOUS at 20:30

## 2019-01-01 RX ADMIN — ALBUMIN HUMAN 12.5 G: 0.05 INJECTION, SOLUTION INTRAVENOUS at 21:42

## 2019-01-01 RX ADMIN — VANCOMYCIN HYDROCHLORIDE 1500 MG: 5 INJECTION, POWDER, LYOPHILIZED, FOR SOLUTION INTRAVENOUS at 16:29

## 2019-01-01 RX ADMIN — NOREPINEPHRINE BITARTRATE 0.03 MCG/KG/MIN: 1 INJECTION INTRAVENOUS at 13:35

## 2019-01-01 RX ADMIN — PIPERACILLIN SODIUM AND TAZOBACTAM SODIUM 4.5 G: 4; .5 INJECTION, POWDER, LYOPHILIZED, FOR SOLUTION INTRAVENOUS at 21:43

## 2019-01-01 RX ADMIN — MULTIPLE VITAMINS W/ MINERALS TAB 1 TABLET: TAB at 09:35

## 2019-01-01 RX ADMIN — ANGIOTENSIN II 40 NG/KG/MIN: 2.5 INJECTION INTRAVENOUS at 22:10

## 2019-01-01 RX ADMIN — METOPROLOL SUCCINATE 25 MG: 25 TABLET, EXTENDED RELEASE ORAL at 09:28

## 2019-01-01 RX ADMIN — HEPARIN SODIUM (PORCINE) LOCK FLUSH IV SOLN 100 UNIT/ML 5 ML: 100 SOLUTION at 13:23

## 2019-01-01 RX ADMIN — ENOXAPARIN SODIUM 70 MG: 80 INJECTION SUBCUTANEOUS at 19:10

## 2019-01-01 RX ADMIN — VASOPRESSIN 2.4 UNITS/HR: 20 INJECTION INTRAVENOUS at 01:20

## 2019-01-01 ASSESSMENT — ACTIVITIES OF DAILY LIVING (ADL)
ADLS_ACUITY_SCORE: 14
ADLS_ACUITY_SCORE: 11
ADLS_ACUITY_SCORE: 18
ADLS_ACUITY_SCORE: 18
ADLS_ACUITY_SCORE: 14
ADLS_ACUITY_SCORE: 18
ADLS_ACUITY_SCORE: 11
ADLS_ACUITY_SCORE: 15
ADLS_ACUITY_SCORE: 13
ADLS_ACUITY_SCORE: 13
ADLS_ACUITY_SCORE: 12
ADLS_ACUITY_SCORE: 13
ADLS_ACUITY_SCORE: 14
ADLS_ACUITY_SCORE: 12
ADLS_ACUITY_SCORE: 11
ADLS_ACUITY_SCORE: 12
ADLS_ACUITY_SCORE: 11
ADLS_ACUITY_SCORE: 11
ADLS_ACUITY_SCORE: 14
ADLS_ACUITY_SCORE: 11
ADLS_ACUITY_SCORE: 12
ADLS_ACUITY_SCORE: 12
ADLS_ACUITY_SCORE: 14
ADLS_ACUITY_SCORE: 12
ADLS_ACUITY_SCORE: 12
ADLS_ACUITY_SCORE: 14
ADLS_ACUITY_SCORE: 14
ADLS_ACUITY_SCORE: 11
ADLS_ACUITY_SCORE: 14
ADLS_ACUITY_SCORE: 15
ADLS_ACUITY_SCORE: 12
ADLS_ACUITY_SCORE: 12
ADLS_ACUITY_SCORE: 13
ADLS_ACUITY_SCORE: 17
ADLS_ACUITY_SCORE: 12
ADLS_ACUITY_SCORE: 13
ADLS_ACUITY_SCORE: 13
ADLS_ACUITY_SCORE: 11
ADLS_ACUITY_SCORE: 11
ADLS_ACUITY_SCORE: 14
ADLS_ACUITY_SCORE: 12
ADLS_ACUITY_SCORE: 13
ADLS_ACUITY_SCORE: 12
ADLS_ACUITY_SCORE: 11
ADLS_ACUITY_SCORE: 14
ADLS_ACUITY_SCORE: 11
ADLS_ACUITY_SCORE: 15
ADLS_ACUITY_SCORE: 14
ADLS_ACUITY_SCORE: 12
ADLS_ACUITY_SCORE: 12
ADLS_ACUITY_SCORE: 11
ADLS_ACUITY_SCORE: 14
ADLS_ACUITY_SCORE: 15
ADLS_ACUITY_SCORE: 15
ADLS_ACUITY_SCORE: 14
ADLS_ACUITY_SCORE: 12
ADLS_ACUITY_SCORE: 15
ADLS_ACUITY_SCORE: 14
ADLS_ACUITY_SCORE: 14
ADLS_ACUITY_SCORE: 18

## 2019-01-01 ASSESSMENT — MIFFLIN-ST. JEOR
SCORE: 1444.62
SCORE: 1390.19
SCORE: 1435.55
SCORE: 1426.47
SCORE: 1336.66
SCORE: 1426.47

## 2019-01-01 ASSESSMENT — ENCOUNTER SYMPTOMS: SEIZURES: 0

## 2019-01-01 ASSESSMENT — LIFESTYLE VARIABLES: TOBACCO_USE: 0

## 2019-01-04 PROBLEM — C80.0 CARCINOMATOSIS (H): Status: ACTIVE | Noted: 2019-01-01

## 2019-01-04 PROBLEM — R19.00 PELVIC MASS: Status: ACTIVE | Noted: 2019-01-01

## 2019-01-04 PROBLEM — F41.9 ANXIETY: Status: ACTIVE | Noted: 2019-01-01

## 2019-01-17 PROBLEM — R19.00 PELVIC MASS IN FEMALE: Status: ACTIVE | Noted: 2019-01-01

## 2019-01-17 NOTE — OR NURSING
Admission complete, labs drawn(not resulted) consent for IR and surgery signed,  Saleem at bedside.(he has her cell phone)  IR [facility] here to transport pt for IVC placement.  Belongings to PACU.  No sedation given at this time.

## 2019-01-17 NOTE — OP NOTE
[facility] Brief Operative Note    Pre-operative diagnosis: ovarian cancer   Post-operative diagnosis Same    Procedure: XL, FAY/BSO, omentectomy, appendectomy, tumor debulking   Surgeon: INEZ Santiago CNP   Assistants(s):    Estimated blood loss: Less than 100 ml    Specimens: Uterus, cervix, b/l tubes and ovaries, omentum, appendix, left para-colic gutter    Findings: Remaining disease to omentum, large left ovarian mass, miliary disease throughout small bowel.

## 2019-01-17 NOTE — PROGRESS NOTES
Admission medication history interview status for the 1/17/2019  admission is complete. See EPIC admission navigator for prior to admission medications     Medication history source reliability:Good    Medication history interview source(s):Patient    Medication history resources (including written lists, pill bottles, clinic record):None    [facility] pharmacy[facility]    Additional medication history information not noted on PTA med list :None    Time spent in this activity: 45 minutes    Prior to Admission medications    Medication Sig Last Dose Taking? Auth Provider   acetaminophen (TYLENOL) 325 MG tablet Take 325-650 mg by mouth daily as needed for mild pain 1/12/2019 at prn Yes Reported, Patient   enoxaparin (LOVENOX) 100 MG/ML syringe Inject 90 mg Subcutaneous 2 times daily 1/16/2019 at am Yes Reported, Patient   LORazepam (ATIVAN PO) Take 0.5 mg by mouth 2 times daily 1/17/2019 at 0830 Yes Reported, Patient   magnesium oxide (MAG-OX) 400 MG tablet Take 400 mg by mouth daily 1/14/2019 Yes Reported, Patient   potassium chloride ER (K-DUR/KLOR-CON M) 20 MEQ CR tablet Take 20 mEq by mouth daily 1/14/2019 Yes Reported, Patient   sertraline (ZOLOFT) 50 MG tablet Take 50 mg by mouth daily 1/16/2019 at am Yes Reported, Patient

## 2019-01-17 NOTE — ANESTHESIA CARE TRANSFER NOTE
Patient: Graciela Adrian    Procedure(s):  LAPAROTOMY EXPLORATORY  HYSTERECTOMY TOTAL ABDOMINAL, BILATERAL SALPINGO OOPHORECTOMY, OMENTECTOMY TUMOR DEBULKING, APPENDECTOMY    Diagnosis: ovarian cance r  Diagnosis Additional Information: No value filed.    Anesthesia Type:   General, ETT     Note:  Airway :Face Mask  Patient transferred to:PACU  Comments: Neuromuscular blockade reversed after TOF 4/4, spontaneous respirations, adequate tidal volumes, followed commands to voice, oropharynx suctioned with soft flexible catheter, extubated atraumatically, extubated with suction, airway patent after extubation.  Oxygen via facemask at 10 liters per minute to PACU. Oxygen tubing connected to wall O2 in PACU, SpO2, NiBP, and EKG monitors and alarms on and functioning, Praveen Hugger warmer connected to patient gown, report on patient's clinical status given to PACU RN, RN questions answered. Handoff Report: Identifed the Patient, Identified the Reponsible Provider, Reviewed the pertinent medical history, Discussed the surgical course, Reviewed Intra-OP anesthesia mangement and issues during anesthesia, Set expectations for post-procedure period and Allowed opportunity for questions and acknowledgement of understanding      Vitals: (Last set prior to Anesthesia Care Transfer)    CRNA VITALS  1/17/2019 1722 - 1/17/2019 1757      1/17/2019             Pulse:  102    SpO2:  100 %    Resp Rate (observed):  15    Resp Rate (set):  10                Electronically Signed By: INEZ Mathur CRNA  January 17, 2019  5:57 PM

## 2019-01-17 NOTE — IR NOTE
Interventional Radiology Intra-procedural Nursing Note    Patient Name: Graciela Adrian  Medical Record Number: [id number]  Today's Date: January 17, 2019    Start Time: 1007  End of procedure time: 1017  Procedure: IVC filter insertion  Report given to: RN in OR  Time pt departs:    :     Other Notes: Pt transferred to IR table. Prepped and draped appropriately. Monitoring equipment applied. NC applied. No complaints of pain at this time. Timeout recorded.  Right groin CDI, soft covered with a 2x2 gauze and tegaderm. No c/o pain at this time. VSS. Pt remains on RA. Transferred back to pre-op.  Pt remains flat on cart.  AFTAB RAGLAND

## 2019-01-17 NOTE — DISCHARGE SUMMARY
"HOSPITAL DISCHARGE SUMMARY    Patient Name: Graciela Adrian  YOB: 1970 Age: 48 year old  Medical Record Number: [id number]  Primary Physician: No Ref-Primary, Physician  Phone: None  Admission Date: 1/17/2019  Discharge Date: 1/21/19    Graciela Adrian  will be discharged from [facility] to Home.    PRINCIPAL DISCHARGE DIAGNOSIS: Carcinomatosis     BRIEF HOSPITAL COURSE: This 48 year old female admitted following robotic assisted laparoscopic total hysterectomy and bilateral salpingo-oophorectomy with lymph node staging. She tolerated the procedure well. Uneventful post operative course and discharge to home on POD #4 with adequate pain control, tolerating orals, voiding and ambulating. She will resume her PTA Lovenox at home.     PROCEDURES PERFORMED DURING HOSPITALIZATION:   XL, tumor debulking  FAY/BSO, omentectomy   Appendectomy     COMPLICATIONS IN HOSPITAL: None    CONSULTATIONS:      PERTINENT FINDINGS/RESULTS AT DISCHARGE:   /75   Pulse 92   Temp 98.2  F (36.8  C) (Temporal)   Resp 12   Ht 1.626 m (5' 4\")   Wt 83.5 kg (184 lb)   SpO2 99%   BMI 31.58 kg/m      Latest Laboratory Results:  Chem:  CBC RESULTS:   Recent Labs   Lab Test 01/17/19  0928   WBC 5.1   RBC 3.80   HGB 10.2*   HCT 31.9*   MCV 84   MCH 26.8   MCHC 32.0   RDW 22.6*        Last Basic Metabolic Panel:  No results found for: NA   Lab Results   Component Value Date    POTASSIUM 3.4 01/17/2019     No results found for: CHLORIDE  No results found for: WALDEMAR  No results found for: CO2  No results found for: BUN  Lab Results   Component Value Date    CR 0.78 01/17/2019     No results found for: GLC      IMPORTANT PENDING TEST RESULTS:  Pathology    CONDITION AT DISCHARGE:    Stabilized    DISCHARGE ORDERS  Current Discharge Medication List      START taking these medications    Details   oxyCODONE-acetaminophen (PERCOCET) 5-325 MG tablet Take 1 tablet by mouth every 6 hours as needed for " pain  Qty: 30 tablet, Refills: 0    Associated Diagnoses: Pelvic mass      senna (SENOKOT) 8.6 MG tablet Take 1-3 tablets by mouth 2 times daily as needed for constipation  Qty: 30 tablet, Refills: 0    Associated Diagnoses: Pelvic mass         CONTINUE these medications which have NOT CHANGED    Details   acetaminophen (TYLENOL) 325 MG tablet Take 325-650 mg by mouth daily as needed for mild pain      enoxaparin (LOVENOX) 100 MG/ML syringe Inject 90 mg Subcutaneous 2 times daily      LORazepam (ATIVAN PO) Take 0.5 mg by mouth 2 times daily      magnesium oxide (MAG-OX) 400 MG tablet Take 400 mg by mouth daily      potassium chloride ER (K-DUR/KLOR-CON M) 20 MEQ CR tablet Take 20 mEq by mouth daily      sertraline (ZOLOFT) 50 MG tablet Take 50 mg by mouth daily             DISCHARGE INSTRUCTION.      FOLLOW-UP: Graciela Adrian should see No Ref-Primary, Physician PRN.    Specialty follow-up: as above.     AFTER HOSPITAL RECOMMENDATIONS  As above.      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    Addendum added for Dr. Dina Lo on 1/29/2019 by Nenita Erazo.

## 2019-01-17 NOTE — PROGRESS NOTES
Interventional Radiology Pre-Procedure Sedation Assessment   Time of Assessment: 9:13 AM    Expected Level: Moderate Sedation    Indication: Sedation is required for the following type of Procedure: Inferior vena cava filter placement with IV moderate sedation    Sedation and procedural consent: Risks, benefits and alternatives were discussed with Patient. Patient seen in pre op. Having surgery later in the day after the IVC filter placement    PO Intake: Appropriately NPO for procedure    ASA Class: Class 3 - SEVERE SYSTEMIC DISEASE, DEFINITE FUNCTIONAL LIMITATIONS.    Mallampati: Grade 1:  Soft palate, uvula, tonsillar pillars, and posterior pharyngeal wall visible    Lungs: Lungs Clear with good breath sounds bilaterally    Heart: Normal heart sounds with tachycardia    History and physical reviewed and no updates needed. I have reviewed the lab findings, diagnostic data, medications, and the plan for sedation. I have determined this patient to be an appropriate candidate for the planned sedation and procedure and have reassessed the patient IMMEDIATELY PRIOR to sedation and procedure.    Greta Phelps, APRN CNP

## 2019-01-17 NOTE — ANESTHESIA PREPROCEDURE EVALUATION
Anesthesia Pre-Procedure Evaluation    Patient: Graciela Adrian   MRN: [id number] : 1970          Preoperative Diagnosis: ovarian cance r    Procedure(s):  LAPAROTOMY EXPLORATORY  HYSTERECTOMY TOTAL ABDOMINAL, BILATERAL SALPINGO OOPHORECTOMY,OMENTECTOMY TUMOR DEBULKING, POSSIBLE PELVIC AND PARA AORTIC YMPHADENECTOMY, POSSIBLE BOWEL RESECTION    Past Medical History:   Diagnosis Date     Anxiety      Carcinomatosis (H)      Obesity (BMI 30-39.9)      History reviewed. No pertinent surgical history.    Anesthesia Evaluation     . Pt has had prior anesthetic.     No history of anesthetic complications          ROS/MED HX    ENT/Pulmonary:      (-) tobacco use and sleep apnea   Neurologic:      (-) seizures and CVA   Cardiovascular:        (-) hypertension   METS/Exercise Tolerance:     Hematologic:         Musculoskeletal:         GI/Hepatic:        (-) GERD and liver disease   Renal/Genitourinary:      (-) renal disease   Endo:     (+) Obesity, .   (-) Type II DM and thyroid disease   Psychiatric:     (+) psychiatric history anxiety      Infectious Disease:         Malignancy:   (+) Malignancy History of Other  Other CA carcinomatosis status post         Other:                          Physical Exam  Normal systems: cardiovascular, pulmonary and dental    Airway   Mallampati: III  TM distance: >3 FB  Neck ROM: full    Dental     Cardiovascular       Pulmonary             Lab Results   Component Value Date    WBC 5.1 2019    HGB 10.2 (L) 2019    HCT 31.9 (L) 2019     2019    POTASSIUM 3.4 2019    CR 0.78 2019    PTT 31 2019    INR 1.03 2019       Preop Vitals  BP Readings from Last 3 Encounters:   19 116/75   10/23/18 (!) 151/104    Pulse Readings from Last 3 Encounters:   19 92   10/23/18 90      Resp Readings from Last 3 Encounters:   19 12   10/23/18 16    SpO2 Readings from Last 3 Encounters:   19 99%   10/23/18 98%      Temp  "Readings from Last 1 Encounters:   01/17/19 36.8  C (98.2  F) (Temporal)    Ht Readings from Last 1 Encounters:   01/17/19 1.626 m (5' 4\")      Wt Readings from Last 1 Encounters:   01/17/19 83.5 kg (184 lb)    Estimated body mass index is 31.58 kg/m  as calculated from the following:    Height as of this encounter: 1.626 m (5' 4\").    Weight as of this encounter: 83.5 kg (184 lb).       Anesthesia Plan      History & Physical Review  History and physical reviewed and following examination; no interval change.    ASA Status:  2 .    NPO Status:  > 8 hours    Plan for General and ETT with Intravenous induction. Maintenance will be Balanced.    PONV prophylaxis:  Ondansetron (or other 5HT-3) and Dexamethasone or Solumedrol  Additional equipment: Videolaryngoscope      Postoperative Care  Postoperative pain management:  Multi-modal analgesia.      Consents  Anesthetic plan, risks, benefits and alternatives discussed with:  Patient and Spouse..        Procedure: Procedure(s):  LAPAROTOMY EXPLORATORY  HYSTERECTOMY TOTAL ABDOMINAL, BILATERAL SALPINGO OOPHORECTOMY,OMENTECTOMY TUMOR DEBULKING, POSSIBLE PELVIC AND PARA AORTIC YMPHADENECTOMY, POSSIBLE BOWEL RESECTION  Preop diagnosis: ovarian cance r    No Known Allergies  Past Medical History:   Diagnosis Date     Anxiety      Carcinomatosis (H)      Obesity (BMI 30-39.9)      History reviewed. No pertinent surgical history.  Prior to Admission medications    Medication Sig Start Date End Date Taking? Authorizing Provider   acetaminophen (TYLENOL) 325 MG tablet Take 325-650 mg by mouth daily as needed for mild pain   Yes Reported, Patient   enoxaparin (LOVENOX) 100 MG/ML syringe Inject 90 mg Subcutaneous 2 times daily   Yes Reported, Patient   LORazepam (ATIVAN PO) Take 0.5 mg by mouth 2 times daily   Yes Reported, Patient   magnesium oxide (MAG-OX) 400 MG tablet Take 400 mg by mouth daily   Yes Reported, Patient   potassium chloride ER (K-DUR/KLOR-CON M) 20 MEQ CR tablet " Take 20 mEq by mouth daily   Yes Reported, Patient   sertraline (ZOLOFT) 50 MG tablet Take 50 mg by mouth daily   Yes Reported, Patient     Current Facility-Administered Medications Ordered in Epic   Medication Dose Route Frequency Last Rate Last Dose     0.9% sodium chloride TABLE SOLN  1,000 mL TABLE SOLN Continuous PRN         ceFAZolin (ANCEF) 1 g vial to attach to  ml bag for ADULT or 50 ml bag for PEDS  1 g Intravenous See Admin Instructions         ceFAZolin (ANCEF) intermittent infusion 2 g in 100 mL dextrose PRE-MIX  2 g Intravenous Pre-Op/Pre-procedure x 1 dose         fentaNYL (PF) (SUBLIMAZE) 100 MCG/2ML injection             fentaNYL (PF) (SUBLIMAZE) injection 25-50 mcg  25-50 mcg Intravenous Q5 Min PRN         fentaNYL (PF) (SUBLIMAZE) injection 25-50 mcg  25-50 mcg Intravenous Q5 Min PRN   50 mcg at 01/17/19 1013     flumazenil (ROMAZICON) injection 0.2 mg  0.2 mg Intravenous q1 min prn         flumazenil (ROMAZICON) injection 0.2 mg  0.2 mg Intravenous q1 min prn         heparin 10,000 units in 1000 mL NS  1,000 mL TABLE SOLN Continuous PRN         lactated ringers infusion   Intravenous Continuous 25 mL/hr at 01/17/19 0926       lidocaine (PF) (XYLOCAINE) 1 % injection 1-30 mL  1-30 mL Subcutaneous Once PRN         lidocaine 1 % 1 mL  1 mL Other Q1H PRN   0.2 mL at 01/17/19 0926     lidocaine 1 % injection             midazolam (VERSED) 1 MG/ML injection             midazolam (VERSED) injection 0.5-1 mg  0.5-1 mg Intravenous Q4 Min PRN         midazolam (VERSED) injection 0.5-1 mg  0.5-1 mg Intravenous Q4 Min PRN   1 mg at 01/17/19 1013     naloxone (NARCAN) injection 0.1-0.4 mg  0.1-0.4 mg Intravenous Q2 Min PRN         naloxone (NARCAN) injection 0.1-0.4 mg  0.1-0.4 mg Intravenous Q2 Min PRN         No current [facility]-ordered outpatient medications on file.     Wt Readings from Last 1 Encounters:   01/17/19 83.5 kg (184 lb)     Temp Readings from Last 1 Encounters:   01/17/19 36.8  C (98.2  F)  (Temporal)     BP Readings from Last 6 Encounters:   01/17/19 116/75   10/23/18 (!) 151/104     Pulse Readings from Last 4 Encounters:   01/17/19 92   10/23/18 90     Resp Readings from Last 1 Encounters:   01/17/19 12   @LASTSAO2(1)@  Recent Labs   Lab Test 01/17/19  0928   POTASSIUM 3.4   CR 0.78     Recent Labs   Lab Test 01/17/19  0928   WBC 5.1   HGB 10.2*        Recent Labs   Lab Test 01/17/19  0928 10/23/18  1830   INR 1.03 1.21*      RECENT LABS:   ECG:   ECHO:   CXR:             Rigoberto Abebe MD

## 2019-01-17 NOTE — OR NURSING
Pt returned from IR @1030  Alert oriented  Saleem at bedside  Dressing right femoral vein is CDI,soft to touch  O2 sat monitor at 96%  RA

## 2019-01-18 NOTE — PLAN OF CARE
Pt arrived from PACU at 1915. VSS on RA. A&O. Pain in abdomen, Dilaudid PCA effective. Dangled at bedside, c/o some dizziness. Tolerating clears. Contreras patent with adequate amount of eamon urine out. Midline incision dressing shadowing-borders marked, wearing abdominal binder. R groin dressing CDI. L ZOHREH w/ large amounts of bloody out put. IS teaching completed, wearing PCDs.     Addendum: Pt's O2 sats dipping too 88% with sleep. Placed on 2L O2, sats now high 90s%.

## 2019-01-18 NOTE — ANESTHESIA POSTPROCEDURE EVALUATION
Patient: Graciela Adrian    Procedure(s):  LAPAROTOMY EXPLORATORY  HYSTERECTOMY TOTAL ABDOMINAL, BILATERAL SALPINGO OOPHORECTOMY, OMENTECTOMY TUMOR DEBULKING, APPENDECTOMY    Diagnosis:ovarian cance r  Diagnosis Additional Information: No value filed.    Anesthesia Type:  General, ETT    Note:  Anesthesia Post Evaluation    Patient location during evaluation: PACU  Patient participation: Able to fully participate in evaluation  Level of consciousness: awake  Pain management: adequate  Airway patency: patent  Cardiovascular status: acceptable  Respiratory status: acceptable  Hydration status: acceptable  PONV: none     Anesthetic complications: None          Last vitals:  Vitals:    01/17/19 1930 01/17/19 2000 01/17/19 2036   BP: 112/66 108/67 113/70   Pulse:  84    Resp: 17 14 15   Temp: 35.2  C (95.4  F)  35.3  C (95.6  F)   SpO2: 97% 97% 97%         Electronically Signed By: Zoya Linton  January 17, 2019  8:57 PM

## 2019-01-18 NOTE — PROGRESS NOTES
"[facility]  Hospitalist Progress Note          Assessment and Plan:     Carcinomatosis (H)/Pelvic masses:  POD 1 x-lap, aspiration of ascites, TAHBSO, omentectomy, tumor debulking, appy.  Doing well.  Pain controlled.  OOB already.  Discussed intra-op findings and procedure.  On Clear liquids.  Can ADAT once BS improved.  Likely heplock IV and transition off PCA tomorrow if doing well.  Anticipate discharge likely Monday.  F/u with treatment planning to be scheduled by my office.  H/o pulmonary emboli and DVT:  IVC filter placed pre-op.  Will increase dose of lovenox and follow Hgb daily.  Home dose of lovenox is 90 mg subcutaneous bid and will send her home on that dose.                       Interval History:   doing well; no cp, sob, n/v/d, pain well controlled on PCA              Medications:   I have reviewed this patient's current medications               Physical Exam:   Blood pressure 111/79, pulse 96, temperature 97.4  F (36.3  C), temperature source Oral, resp. rate 16, height 1.626 m (5' 4\"), weight 78 kg (172 lb), SpO2 94 %.        Vital Sign Ranges  Temperature Temp  Av.3  F (35.7  C)  Min: 95.4  F (35.2  C)  Max: 97.4  F (36.3  C)   Blood pressure Systolic (24hrs), Av , Min:108 , Max:141        Diastolic (24hrs), Av, Min:66, Max:91      Pulse Pulse  Av.5  Min: 77  Max: 110   Respirations Resp  Avg: 15  Min: 10  Max: 21   Pulse oximetry SpO2  Av.4 %  Min: 88 %  Max: 100 %         Intake/Output Summary (Last 24 hours) at 2019 0930  Last data filed at 2019 0700  Gross per 24 hour   Intake 3509 ml   Output 5110 ml   Net -1601 ml       Lungs:   Clear to auscultation     Cardiovascular:   normal apical pulses      Abdomen:   Minimal BS, dressing with old serosanguinous drainage, non-distended                Data:   All laboratory data reviewed  "

## 2019-01-18 NOTE — PLAN OF CARE
Pt is A&Ox4. POD1. VSS on RA- was able to wean. Denies pain- PCA pump in place and being used appropriately. Clear liquid diet. Denies passing flatus- bowel sounds hypoactive. Capno WDL. Godinez patent. ZOHREH on L side- with large amount of output, emptying q1.5 hours- 370 ml out overnight. Midline incision with drainage- drainage extended and marked- continue to monitor. PIV infusing D5 1/2 NS + KCl 20 @ 100. R groin dressing CDI. Plan to remove godinez POD2. Ambulated this AM in room to door and stood while linen was changed- no dizziness noted. Slept between cares.

## 2019-01-19 NOTE — OP NOTE
Procedure Date: 01/17/2019      PREOPERATIVE DIAGNOSIS:  Serous carcinomatosis.      POSTOPERATIVE DIAGNOSES:  Serous carcinomatosis, pelvic masses.      SURGEON:  SAMREEN Lo MD      ASSISTANT:  Sandra Garner NP      ANESTHESIA:  General endotracheal anesthesia.      PROCEDURES:  Exploratory laparotomy, aspiration of ascites, total abdominal hysterectomy, bilateral salpingo-oophorectomy, omentectomy, tumor debulking and appendectomy        INDICATIONS FOR THE PROCEDURE:  The patient is a 48-year-old who was seen by Dr. Howard on 10/1/2018 for an annual exam.  She complained to light menses, abdominal enlargement and early satiety.  A pelvic ultrasound revealed a slightly bulky 10 cm uterus with several small fibroids and an endometrial stripe of 4 mm.  The right ovary measured 9.3 x 8.9 x 7.3 with a solid area measuring 8.4 x 6.4 x 6.4 with a cystic component measuring 4 cm and the left ovary was 3.1 x 2.9 x 2.3 with several small follicles.  A CT scan of the abdomen and pelvis subsequently revealed bilateral solid and cystic pelvic masses, extensive soft tissue thickening along the omentum and fine omental nodularity.  A small 6 mm ovoid density posterior lateral margin of the spleen and a large volume of pelvic and abdominal ascites.  She underwent a paracentesis for 6.4 liters.  Cytology was positive for malignancy.  A CT scan done for staging showed an incidental finding of bilateral PE with a relatively large clot burden.  She was started on Lovenox at that point and her CA-125 prior to starting chemotherapy was 482 units/mL.  She underwent 3 cycles of neoadjuvant chemotherapy with carboplatin and Taxol.  Her last CA-125 had actually increased to 562.3 units/mL.  CT scan of the chest, abdomen and pelvis revealed a small left pleural effusion with associated left lower lobe atelectasis, that had slightly increased pulmonary emboli appeared to significantly improve with a small amount of residual thrombus  in the right lower lobe and a slightly decreased thickening of the omentum consistent with slightly improved metastatic disease, bilateral adnexal ovarian masses were still present, right greater than left and these were unchanged, and the uterine fibroids were also unchanged.  We elected to proceed with cytoreductive surgery at this point.      FINDINGS:  The patient had at least 4 liters of ascites present and when we initially started the case, she was making actively making ascites.  By the completion of the case, she was no longer significantly making ascites.  She had a bulky omentum, but most of the disease had been treated.  She had miliary disease on most of the jejunum and ileum and there were filmy adhesions between the loops of bowel as if she had had significant treatment response.  She had an area where the omentum was stuck to the left paracolic gutter and this was removed along with the paracolic gutter peritoneum.  She had 2 adnexal masses, right greater than left.  Her colon was somewhat adherent to the posterior uterus, but could be freed up and at the completion of the surgery, she had a less than 1 cm nodule at the peritoneal reflection of the rectosigmoid.  She had the miliary disease on the small bowel surfaces.      PROCEDURE IN DETAIL:  The patient was taken to the operating room.  She received broad-spectrum antibiotic prophylaxis.  Knee-high sequential compression devices had been placed on her lower extremities.  In the morning prior to surgery, she had an IVC filter placed by Interventional Radiology as a safety precaution.  She was brought to the operating room.  She was placed in the supine position on the operating room table.  General endotracheal anesthesia was administered in the usual fashion.  Once intubated, she was repositioned in low lithotomy position using well-padded Yobani stirrups.  Her arms were padded and left on arm boards below her shoulder levels.  She was prepped and  draped in the usual sterile fashion including insertion of a 16-Palauan Contreras catheter into her bladder.  A timeout was conducted and everyone agreed upon the procedure.  We started by making a vertical midline incision from the symphysis pubis to the infraepigastric area.  It was taken to around the right side of the umbilicus.  We used the Bovie cautery to divide the subcutaneous tissue and the fascia and the muscles were divided in the midline.  The abdominal cavity was tented up and the peritoneum was opened and the ascites was drained.  We initially drained about 3700 mL of ascites but she continued to make ascites and we are aspirating it throughout the beginning of the case.        At this juncture, we placed a Bookwalter retractor around the incision and used lateral and superior retractor blades.  We freed up the white line of Toldt on either side along the ascending and descending colon.  We harvested the involved paracolic peritoneum on the left hand side.  We freed up the hepatic flexure and the splenic flexure of the bowel.  We divided the bulky omentum from the transverse mesocolon along the entire transverse colon.  We freed it up from the underlying transverse mesocolon and mobilized the gastrocolic ligament.  We freed that up from the tip of the spleen.  We divided the omentum just along the greater curvature of the stomach, taking the short gastrics in the process with the LigaSure device and then we came across the residual omentum near the splenic hilum and the omentum was passed off in 2 pieces in this situation.  We ran the small bowel.  There was only very tiny miliary disease on the surfaces of the serosa and the mesentery.  We now placed her in Trendelenburg and packed the bowel superiorly.  The appendix was somewhat involved with disease.  We divided the mesoappendix with the LigaSure device and came across the proximal portion of the appendix with a TA 3.5 mm stapler.  The appendix was  amputated free.  The appendiceal stump was cauterized with electrocautery and the specimen was passed off the table.  We now packed the bowel superiorly releasing the adhesions from the cecum and the sigmoid colon in the pelvis.  We used a Bookwalter retractor to hold the bowel out of the way using moist laps and a towel to help for packing purposes.  The round ligaments on either side were cauterized with the LigaSure device.  We opened up the posterior broad ligament peritoneum.  We isolated the ovarian vessels by creating a defect in the peritoneum below them.  They were both cauterized and divided at the pelvic brim with the LigaSure device.  We were able to mobilize the right adnexa on its peritoneum towards the uterus and then we came across the proximal fallopian tube and utero-ovarian ligament and passed this off the table.  We then divided the vesicouterine peritoneum, took the bladder down off the anterior surface of the cervix and upper vagina all the way down to the upper vagina.  Starting on both sides, the soft tissues at the isthmus were cauterized and divided with the LigaSure.  We then worked our way down the cardinal ligament with straight Zeppelin clamps, clamping the tissue and dividing it with a knife and suture ligating in a transfixing fashion with #0 Vicryl suture on a CT2 needle all the way down to and including the uterosacral ligament.  We did have to mobilize the bowel off the posterior aspect of the lower cervix and once doing this, we were able to come across the uterosacral ligaments with a gently curved Zeppelins.  They were again divided and suture ligated with #0 Vicryl suture in a transfixing fashion.  We then used right-angle Zeppelins to come across the upper vagina, making sure the bladder was safely of the way.  Tram scissors were utilized to amputate the cervix free of the vagina.  The vaginal angles were secured with #0 Vicryl suture on a CT1 needle in a transfixing  fashion and tagged.  The intervening anterior and posterior aspects of the vagina were reapproximated with #0 Vicryl suture in an interrupted figure-of-eight fashion.  The sutures were then cut that there was no palpable adenopathy present in the pelvic or aortic areas.  I felt that we had removed as much disease as we could surgically.  We irrigated the pelvis.  We placed a 15-Faroese drain from a stab incision in the patient's left lower quadrant and laid this in the pelvis.  It was secured to the skin with 2-0 silk suture.  We then laid Seprafilm in the pelvis and over the bowel underneath the incision.  The patient's abdomen was closed in a mass closure using #1 looped PDS suture from superior and inferior aspects to the midline.  This was then reinforced with #1 Ethibond sutures.  The subcutaneous tissue was freed up and irrigated and the skin was reapproximated with staples.  An island dressing was placed over the main incision and a sponge dressing was placed over the drain.  An abdominal binder will be placed as she is transferred to the recovery room.  Her anesthesia was reversed.  She was extubated and taken to recovery room in stable condition.  Estimated blood loss of 100 mL.      Sandra Garner was my primary assist for the case.  She helped with all aspects of the case including opening and closing of the abdomen.  She helped with necessary countertraction for the omentectomy.  She performed her half of the hysterectomy and helped with cuff closure as well as harvesting the appendix.         KOLBY DIAZ MD             D: 2019   T: 2019   MT: CARROLL      Name:     BLAIR BOYKIN   MRN:      [id number]        Account:        [id number]   :      1970           Procedure Date: 2019      Document: [id number]       cc: DIEGO Howard MD

## 2019-01-19 NOTE — PLAN OF CARE
POD1. VSS. Rates abdominal pain at 3 to 4 out of 10. On dilaudid PCA. One episode of nausea this AM; compazine given, resolved. Tolerating clears. Hypo BS; more audible this afternoon. Advanced to full liquids; has yet to try. No gas yet. Abdominal dressing with small amount of dried drainage. Right groin site CDI. Abdominal binder on. Contreras patent with adequate UO. Ambulated x3 with assist of 1. ZOHREH patent with serosanguineous output. Possibly off PCA tomorrow and discharge Monday per notes. Continue to monitor.

## 2019-01-19 NOTE — PROGRESS NOTES
"[facility] Progress Note          Assessment and Plan:     Carcinomatosis (H)/Pelvic masses:  POD 2 x-lap, aspiration of ascites, TAHBSO, omentectomy, tumor debulking, appy.  Doing well.  Pain controlled. On full liquids.  Can ADAT once BS improved.  Can discontinue Contreras, saline lock IVF, and transition off PCA today. Increase activity, encourage IS. Anticipate discharge likely Monday.  F/u with treatment planning to be scheduled by Dr. Lo's office.  H/o pulmonary emboli and DVT:  IVC filter placed pre-op.  Will increase dose of lovenox and follow Hgb daily.  Home dose of lovenox is 90 mg subcutaneous bid and will send her home on that dose.    dispo: likely discharge Monday, pending progress     Melissa Milligan PA-C  GYN Oncology  Cell [phone]  Available 7-1 pm, after hours contact answering service at [phone]             Interval History:   doing well; no cp, sob, n/v/d, pain well controlled on PCA, tolerating full liquids, no flatus yet              Medications:   I have reviewed this patient's current medications               Physical Exam:   Blood pressure 122/78, pulse 96, temperature 99.6  F (37.6  C), temperature source Oral, resp. rate 16, height 1.626 m (5' 4\"), weight 81.6 kg (180 lb), SpO2 93 %.        Vital Sign Ranges  Temperature Temp  Av.3  F (35.7  C)  Min: 95.4  F (35.2  C)  Max: 97.4  F (36.3  C)   Blood pressure Systolic (24hrs), Av , Min:108 , Max:141        Diastolic (24hrs), Av, Min:66, Max:91      Pulse Pulse  Av.5  Min: 77  Max: 110   Respirations Resp  Avg: 15  Min: 10  Max: 21   Pulse oximetry SpO2  Av.4 %  Min: 88 %  Max: 100 %         Intake/Output Summary (Last 24 hours) at 2019 0930  Last data filed at 2019 0700  Gross per 24 hour   Intake 3509 ml   Output 5110 ml   Net -1601 ml       Lungs:   Clear to auscultation     Cardiovascular:   RRR     Abdomen:   + some BS, dressing with old serosanguinous drainage, " non-distended, soft, nontender                Data:   All laboratory data reviewed

## 2019-01-19 NOTE — PLAN OF CARE
A &O X 4, VSS, pain controlled w/PCA. LS clear. Midline incision w/marked shadow drainage; R groin dressing CDI and ZOHREH dressing CDI. Tolerating full liquid diet. BS faint, more active in RLQ. C/o nausea, gave IV zofran w/good relief. Contreras patent w/light yellow output; ZOHREH w/serosanguinous output which appears to be decreasing. IVF infusing @ 100 mL. Up w/assist of one. Possible discharge on Monday.

## 2019-01-20 NOTE — PLAN OF CARE
A &O x 4, no c/o pain overnight. VSS except tmax 99.8, encouraged IS and ambulation. Midline dressing w/dime sized serous drainage, marked; ZOHREH dressing CDI; R groin dressing CDI. BS audible but hypoactive, no flatus. Up to BR w/good UOP. ZOHREH w/decreasing serosanguinous output. Plan for discharge Monday.

## 2019-01-20 NOTE — PLAN OF CARE
Pt A&O x4. VSS on RA. C/o mild discomfort in abd, tylenol given. Midline incision marked with dried drainage, no change. ZOHREH with minimal serous output. Ambulated halls x3 SBA. Good urine output. Full liquid diet, fair intake. No flatus yet. Plan to discharge once flatus positive. Will continue to monitor.

## 2019-01-20 NOTE — PLAN OF CARE
POD2. Tmax 99.6. Tachy after walk; otherwise VSS. Rates abdominal pain at 1 to 2 out of 10. Transitioned off PCA to po oxycodone. Given x1. Tolerating well. IVF SL. Contreras removed; voiding without difficulty. No gas yet. Audible BS but still somewhat hypo. On full liquids. No nausea today. Ambulated x2 in halls with SBA and ambulated to BR a few times. Up in chair this afternoon. Abdominal dressing with dried drainage. ZOHREH dressing CDI. Discharge plan pending diet tolerance. Continue to monitor.

## 2019-01-20 NOTE — PROGRESS NOTES
"[facility] Progress Note          Assessment and Plan:     Carcinomatosis (H)/Pelvic masses:  POD 3 x-lap, aspiration of ascites, TAHBSO, omentectomy, tumor debulking, appy.  Doing well.  Pain controlled. On full liquids.  Continue ADAT once BS improved.  Increase activity, encourage IS. Anticipate discharge likely Monday.  F/u with treatment planning to be scheduled by Dr. Lo's office.  H/o pulmonary emboli and DVT:  IVC filter placed pre-op.  Will increase dose of lovenox and follow Hgb daily.  Home dose of lovenox is 90 mg subcutaneous bid and will send her home on that dose.    dispo: likely discharge Monday, pending progress     Holli Jacobo MD  Gynecologic Oncology  [facility]  [place] office  [phone]             Interval History:   Doing very well. Pain controlled on oral analgesia. Denies n/v. Minimal flatus. Ambulating. Voiding without issue. Ready to go home.               Medications:   I have reviewed this patient's current medications               Physical Exam:   Blood pressure 125/80, pulse 95, temperature 97.2  F (36.2  C), temperature source Oral, resp. rate 14, height 1.626 m (5' 4\"), weight 82.6 kg (182 lb), SpO2 96 %.        Vital Sign Ranges  Temperature Temp  Av.3  F (35.7  C)  Min: 95.4  F (35.2  C)  Max: 97.4  F (36.3  C)   Blood pressure Systolic (24hrs), Av , Min:108 , Max:141        Diastolic (24hrs), Av, Min:66, Max:91      Pulse Pulse  Av.5  Min: 77  Max: 110   Respirations Resp  Avg: 15  Min: 10  Max: 21   Pulse oximetry SpO2  Av.4 %  Min: 88 %  Max: 100 %         Intake/Output Summary (Last 24 hours) at 2019 0930  Last data filed at 2019 0700  Gross per 24 hour   Intake 3509 ml   Output 5110 ml   Net -1601 ml     UOP: 2225 ml/24h  Weight daily: 83.5 -> 82.6 kg/24h    General: NAD, AAOx3  Lungs:   Clear to auscultation     Cardiovascular:   RRR     Abdomen:   Moderately distended, soft, nonttp no r/g, " dressing intact without staining.    LE: minimal LLE edema             Data:   All laboratory data reviewed

## 2019-01-21 NOTE — PROGRESS NOTES
Patient discharged at 1:03 PM to home with spouse.  IV was de acessed. Pain at time of discharge was 0.  Belongings returned to patient.  Discharge instructions and medications reviewed with patient.  Patient verbalized understanding and all questions were answered.  Prescriptions given to patient.  At time of discharge, patient condition was stable and left the unit escorted by volunteer staff.

## 2019-01-21 NOTE — PLAN OF CARE
VSS. Midline dsg unchanged. BS faint and hypoactive. Encouraged ambulation! Nausea that led to emesis this evening. Zofran and compazine utilized. Pt reports feeling better after throwing up. Up with SBA in room.

## 2019-01-21 NOTE — PLAN OF CARE
A &O x 4, VSS. No c/o pain. Midline dressing w/marked shadow drainage, ZOHREH dressing CDI and R groin dressing CDI. BS+, had BM and passing gas. Multiple bouts of nausea, gave prn po zofran, lost IV access and was anxious about accessing port, gave prn po ativan and was able to access port-good BR, in case of need to give IV anti-emetics; did need to give IV compazine for ongoing nausea.  Hoping nausea won't hold up discharge plan for today. Up to BR w/SBA, good UOP.

## 2019-01-21 NOTE — PROGRESS NOTES
"[facility] Progress Note          Assessment and Plan:     Carcinomatosis (H)/Pelvic masses:  POD 4 x-lap, aspiration of ascites, TAHBSO, omentectomy, tumor debulking, appy.  Doing well.  Pain controlled. Tolerating regular diet. 2 BM's. Mild nausea.  F/u with treatment planning with Dr. Lo arranged. Home with ZOHREH drain.   H/o pulmonary emboli and DVT:  IVC filter placed pre-op. Will have removed as outpatient.  Resume PTA home dose of lovenox 90 mg subcutaneous bid. Has plenty at home.    dispo: discharge today      Sandra Garner CNP  GYN ONC  Cell: [phone]             Interval History:   Doing very well. Pain controlled on oral analgesia. Mild nausea. 2 BM's. Ambulating. Voiding without issue. Ready to go home.               Medications:   I have reviewed this patient's current medications               Physical Exam:   Blood pressure (!) 133/93, pulse 95, temperature 98.2  F (36.8  C), temperature source Oral, resp. rate 16, height 1.626 m (5' 4\"), weight 82.6 kg (182 lb), SpO2 95 %.        Vital Sign Ranges  Temperature Temp  Av.3  F (35.7  C)  Min: 95.4  F (35.2  C)  Max: 97.4  F (36.3  C)   Blood pressure Systolic (24hrs), Av , Min:108 , Max:141        Diastolic (24hrs), Av, Min:66, Max:91      Pulse Pulse  Av.5  Min: 77  Max: 110   Respirations Resp  Avg: 15  Min: 10  Max: 21   Pulse oximetry SpO2  Av.4 %  Min: 88 %  Max: 100 %         Intake/Output Summary (Last 24 hours) at 2019 0930  Last data filed at 2019 0700  Gross per 24 hour   Intake 3509 ml   Output 5110 ml   Net -1601 ml     UOP: 2225 ml/24h  Weight daily: 83.5 -> 82.6 kg/24h    General: NAD, AAOx3  Lungs:   Clear to auscultation     Cardiovascular:   RRR     Abdomen:   Moderately distended, soft, nonttp no r/g, dressing intact without staining. ZOHREH with serosanguinous drainage.    LE: minimal LLE edema             Data:   All laboratory data reviewed  "

## 2019-03-19 NOTE — TELEPHONE ENCOUNTER
Patient returned phone call.  Type of procedure: Filter removal  Location of surgery: [facility]  Date / Check in time: 4/22 6:30am  Radiologist / Surgeon: [email or web address]  Pre-Op Appt Clinic/Dt: Areli [facility]  Packet sent out:  yes  Medication Instructions: take all medication, including blood thinners  Patient has right port    Anahy Christopher RN  IR nurse clinician  [phone]

## 2019-04-22 NOTE — PROGRESS NOTES
Patient ambulating without difficulty.  No further questions at this time.  Discharge to home with spouse.

## 2019-04-22 NOTE — PROGRESS NOTES
Patient back to care suite 1 alert and oriented.  Eating a light meal and tolerating fluids well.

## 2019-04-22 NOTE — IR NOTE
Interventional Radiology Intra-procedural Nursing Note    Patient Name: Graciela Adrian  Medical Record Number: [id number]  Today's Date: April 22, 2019    Start Time: 0825  End of procedure time: 0854  Procedure: ivc FILTER REMOVAL   [facility]  Time pt departs:  0903  :     Other Notes: Pt transferred to IR table. Prepped and draped appropriately. Monitoring equipment applied. NC applied. No complaints of pain at this time. Timeout recorded.  Right neck site CDI, covered with tegaderm and gauze. Site soft. VSS. Pt remains on RA. No /co pain at this time.     AFTAB RAGLAND

## 2019-04-22 NOTE — DISCHARGE INSTRUCTIONS
IVC Filter Removal Discharge Instructions - Neck    After you go home:      Have an adult stay with you until tomorrow.    Drink extra fluids for 2 days.    You may resume your normal diet.    No smoking       For 24 hours - due to the sedation you received:    Relax and take it easy.    Do NOT make any important or legal decisions.    Do NOT drive or operate machines at home or at work.    Do NOT drink alcohol.    Care of Neck Puncture Site:      For the first 24 hrs - check the puncture site every 1-2 hours while awake.    Remove the bandaid after 24 hours. If there is minor oozing, apply another bandaid and remove it after 12 hours.    It is normal to have a small bruise or soreness at the site.    You may shower tomorrow. Do NOT take a bath, or use a hot tub or pool for at least 3 days. Do NOT scrub the site. Do not use lotion or powder near the puncture site.    Activity:            For 2 days:    Do NOT do any heavy activity such as exercise, lifting, or straining.    No housework, yard work or any activity that make you sweat    Do NOT lift more than 10 pounds    Avoid heavy lifting or the overuse of your shoulder for three days.           Bleeding:      If you start bleeding from the site in your neck, sit down and press gently on the site for 10 minutes.     Once bleeding stops, sit still for 2 hours.     [facility] as soon as you can.       Call 911 right away if you have heavy bleeding or bleeding that does not stop.      Medicines:      If you are on Metformin (Glucophage) and your GFR (kidney function level) is >30, you may continue taking your Metformin.    If you are on Metformin (Glucophage) and your GFR (kidney function level) is <30, do not restart the Metformin for 48 hours after your procedure. Check with your primary care giver before restarting the Metformin to see if you need to have blood drawn to recheck your kidney function (GFR).    If you are taking an antiplatelet  medication such as Plavix, do not stop taking it until you talk to your provider.       Take your medications, including blood thinners, unless your provider tells you not to.  If you take Coumadin (Warfarin), have your INR checked by your provider in  3-5 days. Call your clinic to schedule this.    If you have stopped any medicines, check with your provider about when to restart them.    Follow Up Appointments:      Follow up with your primary care provider as needed.    Call the clinic if:      You have increased pain or a large or growing hard lump around the site.    The site is red, swollen, hot or tender.    Blood or fluid is draining from the site.    You have chills or a fever greater than 101 F (38 C).    You have hives, a rash or unusual itching.    Any questions or concerns.        If you have questions or your original symptoms do not improve, call:        [facility] @ [phone]

## 2019-07-22 PROBLEM — I26.99 PULMONARY EMBOLISM (H): Status: ACTIVE | Noted: 2019-01-01

## 2019-07-23 NOTE — PLAN OF CARE
Pt up to unit at 1900. VSS on 1 L O2 ex tachy at  times, satting in 88-90% on RA. LS diminished. Pt reports oxygen is helping to ease her breathing. Voiding adequately. Loose stools per pt report. Port hep locked- heparin discontinued upon arrival and subcutaneous lovenox initiated. Blanchable redness on coccyx and blistering of hands/redness on feet related to chemotherapy. No numbness/tingling. Denies pain/nausea. Small appetite. 10% weight loss in last 2 months. Plan for an echo and gyn/onc to consult today.

## 2019-07-23 NOTE — H&P
[facility]    History and Physical  Hospitalist       Date of Admission:  7/22/2019  Date of Service (when I saw the patient): 07/22/19    Assessment & Plan   Graciela Adrian is a 48 year old female who presents with pulmonary embolism    Pulmonary embolism  Possible right heart strain  In setting of malignancy.  Pulmonary emboli were diagnosed in October 2018.  She had been on Lovenox until 2 months ago when she changed to Eliquis.  She had no symptoms.  She had a routine follow-up CT today and was found that she had new right-sided pulmonary emboli.  This is in the setting of a very large left-sided pleural effusion.  He is hemodynamically stable.  She is asymptomatic.  -Lovenox 1 mg/kg twice daily  -Echocardiogram to evaluate for right-sided function  -further plans after echo    Clear cell ovarian cancer with metastases  She is followed by Dr. Conklin with Oncology and Dr. Lo with Gyn/ Onc.  Currently undergoing chemo.  Concerns with her CT of enlarging lymph nodes and possible liver lesions.  -Consult Dr. Lo with gynecology oncology  -Chemotherapy per them    L sided pleural effusion  Worsening L sided pleural effusion seen on CT 7/22. ? 2/2 ascites vs malignant.  - consider IR drainage or thoracic surgery consult    HTN  Continue pta metoprolol XL 25 mg daily    Anxiety  - sertraline 50 mg daily  - prn lorazepam    Anemia  hgb at 9.6 7/24.  - management as per oncology    DVT Prophylaxis: Enoxaparin (Lovenox) SQ  Code Status: Full Code    Disposition: Expected discharge in ~2 days    Tavo Rogers MD  [phone] (P)  Text Page     Primary Care Physician   Dr. Dina Lo    Chief Complaint   Pulmonary embolism seen on follow up CT     History is obtained from the patient and medical records    History of Present Illness   Graciela Adrian is a 48 year old female who presents with pulmonary embolism.  She was diagnosed with ovarian cancer in October 2018.  During her  work-up she was also found to have pulmonary emboli.  At the time she was started on Lovenox injections.  She has been receiving chemotherapy.  She is also received numerous paracenteses with the most recent on July 19 of 6.5 L.  The day of admission she went for routine follow-up with her oncologist.  Surprisingly, the CT showed new right-sided pulmonary emboli.  There is some concern for heart strain as well in the CT.  But she has worsening of her left pleural effusion to the point of left lung collapse.  There is also noticed some increased lymphadenopathy and possible liver lesions.  She was contacted with this information and told to come to the emergency department.  On evaluation she is hemodynamically stable with stable oxygen sats and blood pressure.  She denies any pain.  She maybe has had some worsening shortness of breath over the last couple of weeks.  Denies any abdominal pain.  Denies any lower extremity edema.    On arrival her blood pressure was 127/85.  O2 sats are 94% on room air.  Heart rate was 104.  Labs done today with her oncologist showed a white count of 8.4 hemoglobin 9.6 platelets of 226.  Potassium was 3.6.  Creatinine was 0.84.  Albumin was 3.4.  LFTs were fine.    Past Medical History    I have reviewed this patient's medical history and updated it with pertinent information if needed.   Past Medical History:   Diagnosis Date     Anxiety      Carcinomatosis (H)      Obesity (BMI 30-39.9)    Clear cell ovarian cancer with omental mets    Past Surgical History   I have reviewed this patient's surgical history and updated it with pertinent information if needed.  Past Surgical History:   Procedure Laterality Date     HYSTERECTOMY TOTAL ABD, DULCE SALPINGO-OOPHORECTOMY, NODE DISSECTION, TUMOR DEBULKING, COMBINED N/A 1/17/2019    Procedure: HYSTERECTOMY TOTAL ABDOMINAL, BILATERAL SALPINGO OOPHORECTOMY, OMENTECTOMY TUMOR DEBULKING, APPENDECTOMY;  Surgeon: Dina Lo MD;  Location:  [facility] OR     IR IVC FILTER PLACEMENT  1/17/2019     IR IVC FILTER REMOVAL  4/22/2019     LAPAROTOMY EXPLORATORY N/A 1/17/2019    Procedure: LAPAROTOMY EXPLORATORY;  Surgeon: Dina Lo MD;  Location: [facility] OR     REMOVE FILTER INFERIOR VENA CAVA N/A 4/22/2019    Procedure: REMOVE FILTER INFERIOR VENA CAVA;  Surgeon: Stiven Aguirre MD;  Location: [facility] OR       Prior to Admission Medications   Prior to Admission Medications   Prescriptions Last Dose Informant Patient Reported? Taking?   Bevacizumab (AVASTIN IV)   Yes No   Sig: In clinic, chemo regimen   LORazepam (ATIVAN PO) 7/22/2019 at am Self Yes Yes   Sig: Take 0.5-1 mg by mouth 2 times daily as needed for anxiety (sleep)    apixaban ANTICOAGULANT (ELIQUIS) 5 MG tablet 7/22/2019 at am  Yes Yes   Sig: Take 5 mg by mouth 2 times daily   magnesium oxide (MAG-OX) 400 MG tablet 2 wks ago Self Yes No   Sig: Take 400 mg by mouth daily   metoprolol succinate ER (TOPROL-XL) 25 MG 24 hr tablet 7/22/2019 at am  Yes Yes   Sig: Take 25 mg by mouth daily   potassium chloride ER (K-DUR/KLOR-CON M) 20 MEQ CR tablet 2 wks ago Self Yes No   Sig: Take 20 mEq by mouth daily   prochlorperazine (COMPAZINE) 10 MG tablet prn  Yes Yes   Sig: Take 10 mg by mouth every 6 hours as needed for nausea or vomiting   sertraline (ZOLOFT) 50 MG tablet 7/22/2019 at am Self Yes Yes   Sig: Take 50 mg by mouth daily      Facility-Administered Medications: None     Allergies   No Known Allergies    Social History   I have reviewed this patient's social history and updated it with pertinent information if needed. Graciela Adrian   Denies alcohol or tobacco.     Family History   I have reviewed this patient's family history and updated it with pertinent information if needed.   Family history is reviewed and is non-contributory    Review of Systems   The 10 point Review of Systems is negative other than noted in the HPI or here.     Physical Exam   Temp: 98.7  F (37.1  C) Temp src:  Oral BP: 127/85 Pulse: 104   Resp: 17 SpO2: 94 % O2 Device: None (Room air)    Vital Signs with Ranges  0 lbs 0 oz    Constitutional: alert, oriented and in no acute distress  Eyes: EOMI, PERRL  HEENT: OP clear  Respiratory: markedly diminished breath sounds on L. R side with good air movement  Cardiovascular: tachy, regular. No murmurs. No edema  GI: soft, mildly distended, nontender  Lymph/Hematologic: no cervical LAD  Genitourinary: deferred  Skin: no rashes or lesions grossly  Musculoskeletal: no deformities or arthritis  Neurologic: CN II-XII, RIVERA, sensation grossly intact  Psychiatric: mood and affect wnl    Data   Data reviewed today:  I personally reviewed the chest CT image(s) showing L sided pleural effusions and R pulmonary emboli and the abdominal CT image(s) showing hypodense liver lesions, ascites.  No lab results found in last 7 days.    No results found for this or any previous visit (from the past 24 hour(s)).

## 2019-07-23 NOTE — CONSULTS
GYN ONC ATTENDING ADDENDUM:  Patient was seen and examined on rounds. I have reviewed/edited to the note to reflect changes. Agree with below note.     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 H/o pulmonary embolus currently on eliquis. Seen by oncologist who ordered a CT. CT showed new right PE with possible concern for right heart strain. Additionally, she had worsening left pleural effusion with essential collapse and mediastinal shift. CT showed significant progression of disease with respect to lymphadenopathy (axillary, mesenteric, and iliac), ascites and liver lesions. Recently on doxil/avastin with last avastin on 7/10. Last doxil/avastin doublet on 6/26/19. Work up for right heart strain was negative on ECHO. She will be undergoing thoracentesis tomorrow AM. Started on lovenox with plan to hold and start heparin gtt tonight. Will hold heparin for procedure tomorrow. Add on paracentesis, as well given progressive ascites. Gets regular taps with med onc in [place]. May need to get heme/onc on board given new PE while on eliquis. Regardless, clear cell cancer particularly thrombogenic and likely an active player in this circumstance. Will need to discuss next steps for treatment with Dr. Lo. OCCC is particularly aggressive and can be chemoresistant. May be worth considering PD-L1/MSI testing to see if she is eligible for pembrolizumab. Other issues this admission - anemia (likely chemo induced), hypokalemia, hypoalbuminemia and doxil related PPE. If any signs of blistering on skin from PPE then consider topical steroid. Otherwise, include lotions and preventive methods such as avoidance of trauma, pressure, tight clothing, and heat.     Exam as below. We will continue to follow along during admission.     Sepideh Turpin MD  Gynecologic Oncology  [facility]  [phone]      [facility]    GYN Oncology  Consultation     Date of Admission:  7/22/2019    Assessment & Plan   1) Clear cell ovarian cancer:  Currently receiving second-line Doxil/Avastin.  Has received 3 cycles thus far, with the most recent being Doxil/avastin on 6/26, and Avastin only on 7/10.  Unfortunately most recent CT scan from 7/22/19 (done in [facility]) shows disease progression, with increase in bilateral axillary, mediastinal, mesentary and left external iliac lymph nodes, and increase in volume of ascites.   has been declining slowly, however.   Will need to discuss change to third-line, once discharged from currently hospitalization. Will consider PD-L1 and MSI testing to determine eligibility for pembrolizumab. Will set up outpatient appointment with Dr. Lo for treatment planning.     2) Pulmonary embolism:  She had PE originally in October of 2018, was started on Lovenox and transitioned to Eliquis two months ago.  Most recent CT reflects new R sided PE's despite prophylaxis.  She is back on lovenox now. Will likely remain on this, rather than transition to oral, in future. May warrant heme/onc consultation given development of PE while on NOAC.    3) Pleural effusion:  Causing heart strain and rightward shift of mediastinum. IR to evaluate today for drainage. Scheduled for tap tomorrow, as she did have Lovenox injection this am.     4) ascites:  She has been getting regular paracenteses up in [facility].  Last one was 3 weeks ago.  WIll order repeat now, as she is symptomatic.     5) palmar/plantar erythema:  This is a side effect from the doxil.  Is not painful, fortunately, but is having significant sloughing of the skin over all fingers.  If any signs of blistering on skin from PPE then consider topical steroid. Otherwise, include lotions and preventive methods such as avoidance of trauma, pressure, tight clothing, and heat.      Mary Kate Rivas, APRN CNP  [phone]    Code Status    Full Code    Reason for Consult   History  of ovarian clear     Chief Complaint   Pt transferred from [facility] last night for acute finding on CT of signifcant pleural effusion and new PE's causing heart strain.     History of Present Illness   10/1/18 Seen by Dr. Howard for an annual exam. C/o light menses, abdominal enlargement and early satiety   10/9/18 PUS: Slightly bulky 10 cm with several small fibroids. Endometrial stripe 4 mm. Right ovary enlarged measuring 9.3 x 8.9 x 7.3 cm with solid area measuring 8.4 x 6.4 x 6.4 cm and a cystic component measuring 4 cm. Left ovary normal measuring 3.1 x 2.9 x 2.3 cm with several small follicles.   10/16/18 CT A/P: Bilateral complex pelvic masses with solid and cystic components (right 9.4 x 7.1 x 7.3 cm, left 3.1 x 3.8 x 3.3 cm). Extensive soft tissue thickening along the omentum and fine omental nodularity. Small 6 mm ovoid intermediate density to low density lesion is identified along the posterolateral margin of the spleen, large volume pelvic and abdominal ascites.   10/23/18: Paracentesis, 6.4 L removed. Cytology positive for malignancy.   10/30/18: CT chest (done for completion staging) has incidental finding of bilateral PE with relatively large clot burden. No finding of matted static disease in the thorax. Benign right upper lobe nodule. Pt started on Lovenox.   10/30/18:  = 482.1 U/mL   11/1/18: C1 neoadjuvant Carboplatin/Taxol (in [place]).   11/9/18: Paracentesis: 4 L removed.   11/21/18: C2 Carboplatin/Taxol   12/11/18:  = 562.3 U/mL   12/12/18: C3 Carboplatin/Taxol   12/20/18: CT C/A/P: Small left pleural effusion with associated left lower lobe atelectasis, slightly increased. Pulmonary emboli appear to have significantly improved with a small amount of residual thrombus in the right lower lobe. Slightly decreased thickening of the omentum consistent with slightly improved metastatic disease. Bilateral adnexal ovarian masses, right greater than left. These are unchanged. Uterine  fibroids, unchanged.   1/17/19 X-lap, TAHBSO, omentectomy, aspiration of ascites, tumor debulking, appendectomy.   Pathology: Bilateral ovarian clear cell carcinomas with metastatic spread to omentum, left pericolic peritoneum, appendix.   2/1/19:  = 156 U/mL   2/19/19: C4 Carboplatin/Taxol/Bevacizumab   3/12/19: C5 Carboplatin/Taxol/Bevacizumab   3/20/19: Genetic testing done: No actionable mutations. BRCA 1/2 NEG   4/4/19: C6 Carboplatin/Taxol/Bevacizumab   4/9/19: CT C/A/P: Moderate left pleural effusion, increased in size. Left lower lobe and lingular atelectasis. Resolution of pulmonary emboli. Moderate ascites, decreased. Omentum no longer shows abnormal thickening or soft tissue attenuation. Small mesenteric lymph nodes, unchanged. IVC filter in place   4/15/19:  = 468.7 U/mL  4/22/19: IVC filter removed  4/29/19: MUGA  EF 64%  4/30/19: C1,D1 Doxil/avastin  5/28/19: C2,D1 doxil/avastin   = 350.4  6/26/19: C3,D1 doxil/avastin   = 157.4 U/mL  7/15/19: MUGA - EF 45.5%  7/22/19:  = 120.2 U/mL     Subjective Hx:   Pt states she feels generally well.  Only feels mildly SOB.  Was actually surprised when she was told the pleural effusion was as bad as it was.  She has been having early satiety and poor appetite, however, which she attributes to her ascites.  Additionally, she points out the skin on her hands, which is peeling in large sheets from all her digits.  Not painful or itchy.  States that her feet are fine.     Past Medical History   Obesity BMI 36.39 kg/m2  Anxiety  Stage IIIC clear cell carcinoma of bilateral ovaries  PE    Past Surgical History   IVC filter placement pre-op  1/17/19 X-lap, TAHBSO, omentectomy, aspiration of ascites, tumor debulking, appendectomy    Prior to Admission Medications   Prior to Admission Medications   Prescriptions Last Dose Informant Patient Reported? Taking?   Bevacizumab (AVASTIN IV)   Yes No   Sig: In clinic, chemo regimen   LORazepam (ATIVAN PO)  7/22/2019 at am Self Yes Yes   Sig: Take 0.5-1 mg by mouth 2 times daily as needed for anxiety (sleep)    apixaban ANTICOAGULANT (ELIQUIS) 5 MG tablet 7/22/2019 at am  Yes Yes   Sig: Take 5 mg by mouth 2 times daily   magnesium oxide (MAG-OX) 400 MG tablet 2 wks ago Self Yes No   Sig: Take 400 mg by mouth daily   metoprolol succinate ER (TOPROL-XL) 25 MG 24 hr tablet 7/22/2019 at am  Yes Yes   Sig: Take 25 mg by mouth daily   potassium chloride ER (K-DUR/KLOR-CON M) 20 MEQ CR tablet 2 wks ago Self Yes No   Sig: Take 20 mEq by mouth daily   prochlorperazine (COMPAZINE) 10 MG tablet prn  Yes Yes   Sig: Take 10 mg by mouth every 6 hours as needed for nausea or vomiting   sertraline (ZOLOFT) 50 MG tablet 7/22/2019 at am Self Yes Yes   Sig: Take 50 mg by mouth daily      Facility-Administered Medications: None     Allergies   No Known Allergies    Social History    x 10 years. Lives with  in [place], [place]. Never smoker. 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       Family History   Mother with hypercholesterolemia  Father with hypercholesterolemia, HTN, arrhythmia requiring ablation  Great aunt with breast cancer  Aunt with uterine cancer  Uncle with prostate cancer      Physical Exam   Temp: 96.6  F (35.9  C) Temp src: Oral BP: 124/87 Pulse: 84   Resp: 18 SpO2: 99 % O2 Device: Nasal cannula Oxygen Delivery: 1 LPM  Vital Signs with Ranges  Temp:  [96.6  F (35.9  C)-98.7  F (37.1  C)] 96.6  F (35.9  C)  Pulse:  [] 84  Resp:  [17-18] 18  BP: (117-127)/(83-87) 124/87  SpO2:  [94 %-99 %] 99 %  160 lbs 3.2 oz  GEN:  Pleasant, alert, NAD, sitting in bed  LUNGS:  Poor air exchange left upper and lower lobes, R side CTA  COR: RRR, mildly tachycardic, no murmer  ABD: Distended, firm, non-tender, no palpable masses  EXT:  Bilateral hands with sloughing of skin on palmar surface of all digits.  Some areas peeling.  Skin intact and pink underneath.  No open areas.  Palms okay.       Data     CT from  [facility]   7/22/19     EXAM:  CT chest, abdomen, and pelvis with IV contrast.    IMPRESSION:    1.  There are new right-sided pulmonary emboli since the comparison exam.  Flattening of the intraventricular septum suggests elevated right sided cardiac pressures.  2.  Interval increase in the volume of left-sided pleural effusion.  The left lung is largely collapsed.  Rightward shift of the mediastinum.  3.  Interval increase in the volume of bilateral axillary and mediastinal lymph nodes.  While these are nonspecific, suspect progression of metastatic disease.  4.  Interval increase in the volume of hypodense ascites in the abdomen and pelvis.  The volume of adenopathy along the mesentery is also increased.  5.  . Interval removal of the filter from the inferior vena cava.  6.  Interval increase in the volume of a lymph node along the left external iliac chain.    ECHO 7/22/19  Interpretation Summary     Extremely poor quality study with limited visualization. Contrast not used.  Left ventricular systolic function is normal. The visual ejection fraction is  estimated at 55-60%.  The right ventricle is grossly normal size. The right ventricular systolic  function is borderline reduced.  There is no pericardial effusion. Large left pleural effusion.  Dilated IVC with minimal respirophasic variation.  No prior.

## 2019-07-23 NOTE — PROGRESS NOTES
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  Pulmonary emboli were diagnosed in October 2018.  She had been on Lovenox until 2 months ago when she changed to Eliquis  - CT at clinic ([facility]) noted with new right-sided PE with suggestion of elevated right heart pressures  - Currently on 1 L nasal cannula oxygen, will wean down; hemodynamically stable, tachycardia improving  - continue Lovenox 1 mg/Kg BID (planning to hold  7/23 night and 7/24 am for thoracentesis/paracentesis-- will start heparin drip tonight with plans to resume Lovenox after the procedures)  - ECHO mostly unremarkable with no significant right heart strain  - monitor on telemetry     # Clear cell ovarian cancer with metastases  - She is followed by Dr. Lo with Gyn/ Onc  - oncology consulted; plans for outpatient chemo  - CT in the clinic noted with interval increase in volume of left-sided pleural effusion with left lung largely collapsed and rightward shift of mediastinum, interval increase in bilateral axillary and mediastinal lymph nodes with suspected progression of metastatic disease, also concern for liver lesions     # L sided pleural effusion  # Malignant ascites  - Worsening L sided pleural effusion seen on CT 7/22; has been dyspneic on exertion and also notes early satiety and some nausea, likely sec to ascites  - will get US guided thoracentesis 7/24/19  - gyne-onc also recommending paracentesis-- hopefully can  "obtain both at same setting    # Hypokalemia  -potassium 3.2, will start potassium replacement protocol  -she takes 20 mEq Kdur tablet at home, apparently has difficulty swallowing the pill, will change to liquid     # HTN  Continue pta metoprolol XL 25 mg daily     # Anxiety  - sertraline 50 mg daily  - prn lorazepam     # Anemia, likely related to malignancy, chronic  - baseline Hb seems around 9-10  - will repeat Hb in am; transfuse prn for Hb <7     DVT Prophylaxis: Enoxaparin (Lovenox)/ Heparin as above  Code Status: Full Code     Disposition: Expected discharge in 1-2 days    -Care plan discussed with patient, attendant nurse and gynecologic oncology                  Physical Exam:      Blood pressure 118/83, pulse 93, temperature 96.6  F (35.9  C), temperature source Oral, resp. rate 18, height 1.626 m (5' 4\"), weight 72.7 kg (160 lb 3.2 oz), SpO2 99 %.  Vitals:    07/22/19 2000   Weight: 72.7 kg (160 lb 3.2 oz)     Vital Signs with Ranges  Temp:  [96.6  F (35.9  C)-98.7  F (37.1  C)] 96.6  F (35.9  C)  Pulse:  [] 93  Resp:  [17-18] 18  BP: (117-127)/(83-86) 118/83  SpO2:  [94 %-99 %] 99 %  I/O's Last 24 hours  I/O last 3 completed shifts:  In: 240 [P.O.:240]  Out: -     Constitutional: Alert, awake and oriented X 3; lying comfortably in bed in no apparent distress   HEENT: Pupils equal and reactive to light and accomodation, EOMI intact; neck supple no raised JVD or rigidity    Oral cavity: Moist mucosa   Cardiovascular: Normal s1 s2, regular rate and rhythm, no murmur   Lungs:  lung sounds diminished on left; clear on right   Abdomen: Soft, mildly distended, no guarding, rigidity or rebound; BS +   LE : No edema   Musculoskeletal: Power 5/5 in all extremities   Neuro: No focal neurological deficits noted, CN II to XII grossly intact   Psychiatry: normal mood and affect                Medications:          enoxaparin  70 mg Subcutaneous Q12H     heparin  5 mL Intracatheter Q28 Days     heparin lock " flush  5-10 mL Intracatheter Q24H     metoprolol succinate ER  25 mg Oral Daily     potassium chloride  20 mEq Oral Daily     sertraline  50 mg Oral Daily     sodium chloride (PF)  3 mL Intracatheter Q8H     PRN Meds: acetaminophen, heparin lock flush, lidocaine 4%, lidocaine (buffered or not buffered), LORazepam, magnesium hydroxide, melatonin, naloxone, ondansetron **OR** ondansetron, - MEDICATION INSTRUCTIONS -, potassium chloride, potassium chloride with lidocaine, potassium chloride, potassium chloride, potassium chloride, prochlorperazine, senna-docusate **OR** senna-docusate, sodium chloride (PF), sodium chloride (PF), sodium chloride (PF)         Data:      All new lab and imaging data was reviewed.   Recent Labs   Lab Test 07/23/19  0600 04/22/19  0657 01/20/19  0647  01/18/19  0730 01/17/19  0928  10/23/18  1830   WBC 4.8 3.2*  --   --  5.7 5.1   < >  --    HGB 7.8* 10.1* 9.0*   < > 10.1* 10.2*   < >  --    MCV 87 89  --   --  85 84   < >  --     135* 308  --  263 323   < >  --    INR  --  1.07  --   --   --  1.03  --  1.21*    < > = values in this interval not displayed.      Recent Labs   Lab Test 07/23/19 0600 04/22/19 0657 01/20/19  0647 01/19/19  0724 01/18/19  0730 01/17/19  0928     --   --   --  138  --    POTASSIUM 3.2*  --   --   --  3.8 3.4   CHLORIDE 105  --   --   --  104  --    CO2 26  --   --   --  25  --    BUN 8  --   --   --  8  --    CR 0.74 0.73 0.70  --  0.68 0.78   ANIONGAP 8  --   --   --  9  --    WALDEMAR 8.2*  --   --   --  8.4*  --    GLC 84  --   --  131* 136*  --      No lab results found.    Invalid input(s): TROP, TROPONINIES

## 2019-07-23 NOTE — CONSULTS
"CLINICAL NUTRITION SERVICES  -  ASSESSMENT NOTE      Recommendations Ordered by Registered Dietitian (RD): MVI-M daily   Smoothie once per day per patient request    Malnutrition:   % Weight Loss:  > 7.5% in 3 months (severe malnutrition)  % Intake:  </= 50% for >/= 1 month (severe malnutrition)  Subcutaneous Fat Loss:  Upper arms moderate depletion   Muscle Loss:  Temporal region mild depletion and Posterior calf region moderate-severe depletion  Fluid Retention:  None noted    Malnutrition Diagnosis: Severe malnutrition  In Context of:  Acute illness or injury  Chronic illness or disease        REASON FOR ASSESSMENT  Graciela Adrian is a 48 year old female seen by Registered Dietitian for Admission Nutrition Risk Screen for unintentional loss of 10# or more in the past two months      NUTRITION HISTORY  - Information obtained from patient.  She states that her appetite is poor in general, overall just doesn't feel like eating.  She also notes that lots of foods don't sound good to her (since starting her newest chemo regimen) - for example she has been avoiding fried foods or foods that have strong smells.  She really has been liking a lot of fruit but unfortunately this does not have a lot of calories or protein.  She has started doing Ensure once daily but does feel like when she starts keeping track of her intake she gets \"food anxiety\".        CURRENT NUTRITION ORDERS  Diet Order:     Regular     Current Intake/Tolerance:  Patient had a muffin and OJ for breakfast   Overall just isn't hungry       NUTRITION FOCUSED PHYSICAL ASSESSMENT FOR DIAGNOSING MALNUTRITION)  Completed:  Yes Partial assessment - Arms and legs + temporal          Observed:    Muscle wasting (refer to documentation in Malnutrition section)  Subcutaneous fat wasting (refer to documentation in Malnutrition section)    Obtained from Chart/Interdisciplinary Team:  None     ANTHROPOMETRICS  Height: 5' 4\"  Weight: 72.7 kg (160#)(7/22)  Body " "mass index is 27.5 kg/m   Weight Status:  Overweight BMI 25-29.9  IBW: 54.5 kg   % IBW: 133%  Weight History: Patient was 180# three months ago --> down 20# or 11% over the last 3 months     LABS  Labs reviewed    MEDICATIONS  Medications reviewed      ASSESSED NUTRITION NEEDS PER APPROVED PRACTICE GUIDELINES:    Dosing Weight 59 kg (adjusted)  Estimated Energy Needs: 2228-6431 kcals (25-30 Kcal/Kg)  Justification: overweight  Estimated Protein Needs: 70-90 grams protein (1.2-1.5 g pro/Kg)  Justification: hypercatabolism with acute illness  Estimated Fluid Needs: 8062-9019 mL (1 mL/Kcal)  Justification: maintenance    MALNUTRITION:  % Weight Loss:  > 7.5% in 3 months (severe malnutrition)  % Intake:  </= 50% for >/= 1 month (severe malnutrition)  Subcutaneous Fat Loss:  Upper arms moderate depletion   Muscle Loss:  Temporal region mild depletion and Posterior calf region moderate-severe depletion  Fluid Retention:  None noted    Malnutrition Diagnosis: Severe malnutrition  In Context of:  Acute illness or injury  Chronic illness or disease    NUTRITION DIAGNOSIS:  Inadequate oral intake related to poor appetite, taste and change smells, and self reported \"food anxiety\" as evidenced by taking very small amounts currently and prior to admission       NUTRITION INTERVENTIONS  Recommendations / Nutrition Prescription  Continue regular diet as tolerated  MVI-M daily   Smoothie once per day per patient request     Implementation  Nutrition education: Per Provider order if indicated   Medical Food Supplement and Multivitamin/Mineral:  Ordered as above     Nutrition Goals  Patient will order at least 3-4 items per meal and consume 50-75% of meals and supplement     MONITORING AND EVALUATION:  Progress towards goals will be monitored and evaluated per protocol and Practice Guidelines    Martina William RD, LD, CNSC   Clinical Dietitian - [facility]                 "

## 2019-07-23 NOTE — PLAN OF CARE
A&Ox4. VSS ex Tele Sinus tachy, on 1L of O2. Up Independently. Regular diet. K+ 3.2 replaced during shift, recheck at 1800. Paracentesis and Thoracentesis tomorrow hold Lovenox, plan to start Heparin infusion at 2200. Continue to monitor.

## 2019-07-23 NOTE — PHARMACY-ADMISSION MEDICATION HISTORY
Admission medication history interview status for the 7/22/2019  admission is complete. See EPIC admission navigator for prior to admission medications     Medication history source reliability:Good, pt interview, info in care everywhere, [facility]    Actions taken by pharmacist (provider contacted, etc):added eliquis, ativan, toprol, compazine. removed apap    Additional medication history information not noted on PTA med list :patient has a hard time swallowing K and Mg tabs    Medication reconciliation/reorder completed by provider prior to medication history? No    Time spent in this activity: 15 minutes    Prior to Admission medications    Medication Sig Last Dose Taking? Auth Provider   apixaban ANTICOAGULANT (ELIQUIS) 5 MG tablet Take 5 mg by mouth 2 times daily 7/22/2019 at am Yes Unknown, Entered By History   LORazepam (ATIVAN PO) Take 0.5-1 mg by mouth 2 times daily as needed for anxiety (sleep)  7/22/2019 at am Yes Reported, Patient   metoprolol succinate ER (TOPROL-XL) 25 MG 24 hr tablet Take 25 mg by mouth daily 7/22/2019 at am Yes Unknown, Entered By History   prochlorperazine (COMPAZINE) 10 MG tablet Take 10 mg by mouth every 6 hours as needed for nausea or vomiting prn Yes Unknown, Entered By History   sertraline (ZOLOFT) 50 MG tablet Take 50 mg by mouth daily 7/22/2019 at am Yes Reported, Patient   Bevacizumab (AVASTIN IV) In clinic, chemo regimen   Reported, Patient   magnesium oxide (MAG-OX) 400 MG tablet Take 400 mg by mouth daily 2 wks ago  Reported, Patient   potassium chloride ER (K-DUR/KLOR-CON M) 20 MEQ CR tablet Take 20 mEq by mouth daily 2 wks ago  Reported, Patient

## 2019-07-24 NOTE — PROGRESS NOTES
RADIOLOGY PROCEDURE NOTE  Patient name: Graciela Adrian  MRN: [id number]  : 1970    Pre-procedure diagnosis: Ascites; pleural effusion  Post-procedure diagnosis: Same    Procedure Date/Time: 2019  8:45 AM  Procedure: Paracentesis; left thoracentesis  Estimated blood loss: None  Specimen(s) collected with description: Ascites; pleural fluid  The patient tolerated the procedure well with no immediate complications.  Significant findings:none    See imaging dictation for procedural details.    Provider name: Robbie Dodge  Assistant(s):None

## 2019-07-24 NOTE — PROGRESS NOTES
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  She had been on Lovenox until 2 months ago when she changed to Eliquis  - CT at clinic ([facility]) noted with new right-sided PE with suggestion of elevated right heart pressures  - started initially on Lovenox 1 mg/kg BID dosing, then switched to heparin drip given planned thoracentesis/paracentesis  - currently still on heparin drip  - Hematology consult ending given new PE while on Eliquis; likely will need to go back on Lovenox  - ECHO mostly unremarkable with no significant right heart strain  - monitor on telemetry  - still on O2 2 liters via NC, wean off as able     # Clear cell ovarian cancer with metastases  - She is followed by Dr. Lo with Gyn/ Onc  - oncology consulted; plans for outpatient chemo  - CT in the clinic noted with interval increase in volume of left-sided pleural effusion with left lung largely collapsed and rightward shift of mediastinum, interval increase in bilateral axillary and mediastinal lymph nodes with suspected progression of metastatic disease, also concern for liver lesions     # L sided pleural effusion  # Recurrent Malignant ascites  - Worsening L sided pleural effusion seen on CT 7/22; has been dyspneic on exertion and also notes early satiety and some nausea, likely sec to ascites  - underwent US guided thoracentesis today 7/24/19 with 1950 cc fluid removed  - also s/p paracentesis with 2150 cc ascitic fluid removed (had paracentesis in the past)     # Hypokalemia  -potassium 3.2 on admission  -she takes 20 mEq Kdur tablet at  home, apparently has difficulty swallowing the pill, so changed to liquid form  - k replacement protocol  - K today 3.9     # HTN  Continue pta metoprolol XL 25 mg daily     # Anxiety  - sertraline 50 mg daily  - prn lorazepam     # Anemia, likely related to malignancy, chronic  - baseline Hb seems around 9-10  - Hb here stable at 7.8    # Palmar/plantar erythema  - as per Oncology- this is a side effect from the doxil; as per Oncology-if any signs of blistering on skin from PPE then consider topical steroid. Otherwise, include lotions and preventive methods such as avoidance of trauma, pressure, tight clothing, and heat.    DVT Prophylaxis: Heparin drip  Code Status: Full Code  Expected discharge: tomorrow, recommended to prior living arrangement once weaned off O2.    Thais Diaz MD    Interval History   Felling a little better, still with some SOB; also reports some nonproductive cough and runny nose; denies chest pain, no abd pain, no N/V; states she is OK to be back on Lovenox if recommended; discussed with RN    -Data reviewed today: I reviewed all new labs and imaging results over the last 24 hours. I personally reviewed the US paracentesis and thoracentesis image(s) showing as above.    Physical Exam   Temp: 99.5  F (37.5  C) Temp src: Oral BP: 101/70 Pulse: 96   Resp: 18 SpO2: 94 % O2 Device: Nasal cannula Oxygen Delivery: 2 LPM  Vitals:    07/22/19 2000   Weight: 72.7 kg (160 lb 3.2 oz)     Vital Signs with Ranges  Temp:  [97.8  F (36.6  C)-99.5  F (37.5  C)] 99.5  F (37.5  C)  Pulse:  [] 96  Resp:  [16-18] 18  BP: (101-132)/(59-86) 101/70  SpO2:  [86 %-98 %] 94 %  I/O last 3 completed shifts:  In: 120 [P.O.:120]  Out: -     Constitutional: Awake, alert, NAD  Respiratory: diminished air entry left base, no rales, no wheezing  Cardiovascular: S1S2, mild tachycardia, no rubs  GI: abd- soft, mildly distended, nontender, BS present  Skin/Integumen: sloughing of the skin over her fingers  Other:       Medications     HEParin 1,100 Units/hr (07/24/19 0937)     - MEDICATION INSTRUCTIONS -       - MEDICATION INSTRUCTIONS -         heparin  5 mL Intracatheter Q28 Days     heparin lock flush  5-10 mL Intracatheter Q24H     metoprolol succinate ER  25 mg Oral Daily     multivitamin w/minerals  1 tablet Oral Daily     potassium chloride  20 mEq Oral Daily     sertraline  50 mg Oral Daily       Data   Recent Labs   Lab 07/24/19  0557 07/23/19  1820 07/23/19  0600   WBC 5.5  --  4.8   HGB 7.8*  --  7.8*   MCV 88  --  87     --  190   INR 1.24*  --   --      --  139   POTASSIUM 3.9 4.1 3.2*   CHLORIDE 106  --  105   CO2 26  --  26   BUN 7  --  8   CR 0.71  --  0.74   ANIONGAP 6  --  8   WALDEMAR 8.3*  --  8.2*   GLC 86  --  84       Recent Results (from the past 24 hour(s))   US Thoracentesis    Narrative     EXAM: US THORACENTESIS       7/24/2019 9:01 AM       HISTORY: Pleural effusion.      PROCEDURE:  Written informed consent was obtained from the patient  prior to the procedure. The risks and benefits including bleeding,  infection and pneumothorax were discussed and the patient wished to  continue. Initial ultrasound images demonstrated a left pleural fluid  collection. The skin overlying this collection was marked, prepped,  draped and anesthetized in usual sterile fashion utilizing 10 mL 1%  lidocaine .  Thoracentesis catheter was then placed into the pleural  fluid collection with return of  1950 mL dark eamon fluid. Patient  tolerated the procedure well. Followup chest x-ray was ordered.      US guidance was utilized to enter the left pleural space for  thoracentesis with permanent image recoding.      Impression    IMPRESSION:  Ultrasound-guided left thoracentesis. Patient experienced  some discomfort and fluid drainage was stopped, there appeared to be  some residual pleural fluid that was not drained.    AIMEE DURÁN PA-C   US Paracentesis    Narrative    EXAM: US PARACENTESIS  7/24/2019 9:02 AM        HISTORY: Ascites.    PROCEDURE:  Written informed consent was obtained from the patient  prior to the procedure. The risks and benefits including bleeding,  infection and abdominal organ injury were discussed and the patient  wished to continue. Initial ultrasound images demonstrated a large  right lower quadrant fluid collection. The skin overlying this  collection was marked, prepped, draped and anesthetized in usual  sterile fashion utilizing 10 mL 1% lidocaine . The paracentesis  catheter was then placed into the peritoneal fluid collection with  return of 2150 mL of green-tinged, milky chylous fluid. Patient  tolerated the procedure well. The patient will receive intravenous  albumin on the usual sliding scale as needed per protocol.      US guidance was utilized to enter the peritoneal space for  paracentesis with permanent image recoding.      Impression    IMPRESSION:  Ultrasound-guided paracentesis.      AIMEE DURÁN PA-C   XR Chest 1 View    Narrative    CHEST ONE VIEW  7/24/2019 9:05 AM     HISTORY: Status post left thoracentesis.    COMPARISON: None.      Impression    IMPRESSION:  Portable view of the chest is performed. There is no  pneumothorax following left thoracentesis. Small amount of residual  left pleural fluid is noted. Right lung is clear without significant  pleural fluid. Heart is normal in size. Right chest port is present  with catheter tip in the distal SVC in good position.    LINNEA ALANIZ MD

## 2019-07-24 NOTE — CONSULTS
[facility]    GYN Oncology Consultation     Date of Admission:  7/22/2019    Assessment & Plan   1) Clear cell ovarian cancer:  Currently receiving second-line Doxil/Avastin for recurrent, progressive clear cell carcinoma.  Has received 3 cycles thus far, with the most recent being Doxil/avastin on 6/26, and Avastin only on 7/10.  Unfortunately most recent CT scan from 7/22/19 (done in [facility]) shows disease progression, with increase in bilateral axillary, mediastinal, mesentary and left external iliac lymph nodes, and increase in volume of ascites.   has been declining slowly, however.   Will need to discuss change to third-line, once discharged from currently hospitalization. Will consider PD-L1 and MSI testing to determine eligibility for pembrolizumab. Will set up outpatient appointment with Dr. Lo for treatment planning.     2) Pulmonary embolism:  She had PE originally in October of 2018, was started on Lovenox and transitioned to Eliquis two months ago.  Most recent CT showed new R sided PE's despite prophylaxis.  She is back on lovenox now. Will remain on this, rather than transition to oral, in future.     3) Pleural effusion:  Causing heart strain and rightward shift of mediastinum. IR performed thoracentesis succefully today, removing 1,950mL.     4) anemia:  Chemo-induced.  Hgb steady at 7.8 today. Heme-onc weighed in and we will continue to monitor for now.     5) ascites:  She has been getting regular paracenteses up in [facility].  Last one was 3 weeks ago.  Had repeat today, resulting in 1,950mL.  Will order this as PRN on outpatient basis.     5) palmar/plantar erythema:  This is a side effect from the doxil.  No secondary infection.  Emollients, steroid if needed.  Watch carefully.     Disposition:  Okay for discharge from our perspective, once cleared by hospitalist.  We can arrange for OP thoracentesis if PE recurs.      Mary Kate Rivas, APRN CNP  [phone]    Code  Status    Full Code    Reason for Consult   History of ovarian clear     Chief Complaint   Pt transferred from [facility] last night for acute finding on CT of signifcant pleural effusion and new PE's causing heart strain.     History of Present Illness   10/1/18 Seen by Dr. Howard for an annual exam. C/o light menses, abdominal enlargement and early satiety   10/9/18 PUS: Slightly bulky 10 cm with several small fibroids. Endometrial stripe 4 mm. Right ovary enlarged measuring 9.3 x 8.9 x 7.3 cm with solid area measuring 8.4 x 6.4 x 6.4 cm and a cystic component measuring 4 cm. Left ovary normal measuring 3.1 x 2.9 x 2.3 cm with several small follicles.   10/16/18 CT A/P: Bilateral complex pelvic masses with solid and cystic components (right 9.4 x 7.1 x 7.3 cm, left 3.1 x 3.8 x 3.3 cm). Extensive soft tissue thickening along the omentum and fine omental nodularity. Small 6 mm ovoid intermediate density to low density lesion is identified along the posterolateral margin of the spleen, large volume pelvic and abdominal ascites.   10/23/18: Paracentesis, 6.4 L removed. Cytology positive for malignancy.   10/30/18: CT chest (done for completion staging) has incidental finding of bilateral PE with relatively large clot burden. No finding of matted static disease in the thorax. Benign right upper lobe nodule. Pt started on Lovenox.   10/30/18:  = 482.1 U/mL   11/1/18: C1 neoadjuvant Carboplatin/Taxol (in [place]).   11/9/18: Paracentesis: 4 L removed.   11/21/18: C2 Carboplatin/Taxol   12/11/18:  = 562.3 U/mL   12/12/18: C3 Carboplatin/Taxol   12/20/18: CT C/A/P: Small left pleural effusion with associated left lower lobe atelectasis, slightly increased. Pulmonary emboli appear to have significantly improved with a small amount of residual thrombus in the right lower lobe. Slightly decreased thickening of the omentum consistent with slightly improved metastatic disease. Bilateral adnexal ovarian masses, right  greater than left. These are unchanged. Uterine fibroids, unchanged.   1/17/19 X-lap, TAHBSO, omentectomy, aspiration of ascites, tumor debulking, appendectomy.   Pathology: Bilateral ovarian clear cell carcinomas with metastatic spread to omentum, left pericolic peritoneum, appendix.   2/1/19:  = 156 U/mL   2/19/19: C4 Carboplatin/Taxol/Bevacizumab   3/12/19: C5 Carboplatin/Taxol/Bevacizumab   3/20/19: Genetic testing done: No actionable mutations. BRCA 1/2 NEG   4/4/19: C6 Carboplatin/Taxol/Bevacizumab   4/9/19: CT C/A/P: Moderate left pleural effusion, increased in size. Left lower lobe and lingular atelectasis. Resolution of pulmonary emboli. Moderate ascites, decreased. Omentum no longer shows abnormal thickening or soft tissue attenuation. Small mesenteric lymph nodes, unchanged. IVC filter in place   4/15/19:  = 468.7 U/mL  4/22/19: IVC filter removed  4/29/19: MUGA  EF 64%  4/30/19: C1,D1 Doxil/avastin  5/28/19: C2,D1 doxil/avastin   = 350.4  6/26/19: C3,D1 doxil/avastin   = 157.4 U/mL  7/15/19: MUGA - EF 45.5%  7/22/19:  = 120.2 U/mL     Subjective Hx:   Pt continues to feel very well.  No SOB or chest pain now.  Just a slight dry cough from after the thoracentesis.  Abdomen feels better, too, less tight.  Appetite is great.  Skin on hands unchanged from yesterday.     Past Medical History   Obesity BMI 36.39 kg/m2  Anxiety  Stage IIIC clear cell carcinoma of bilateral ovaries  PE    Past Surgical History   IVC filter placement pre-op  1/17/19 X-lap, TAHBSO, omentectomy, aspiration of ascites, tumor debulking, appendectomy    Prior to Admission Medications   Prior to Admission Medications   Prescriptions Last Dose Informant Patient Reported? Taking?   Bevacizumab (AVASTIN IV)   Yes No   Sig: In clinic, chemo regimen   LORazepam (ATIVAN PO) 7/22/2019 at am Self Yes Yes   Sig: Take 0.5-1 mg by mouth 2 times daily as needed for anxiety (sleep)    apixaban ANTICOAGULANT (ELIQUIS) 5 MG  tablet 7/22/2019 at am  Yes Yes   Sig: Take 5 mg by mouth 2 times daily   magnesium oxide (MAG-OX) 400 MG tablet 2 wks ago Self Yes No   Sig: Take 400 mg by mouth daily   metoprolol succinate ER (TOPROL-XL) 25 MG 24 hr tablet 7/22/2019 at am  Yes Yes   Sig: Take 25 mg by mouth daily   potassium chloride ER (K-DUR/KLOR-CON M) 20 MEQ CR tablet 2 wks ago Self Yes No   Sig: Take 20 mEq by mouth daily   prochlorperazine (COMPAZINE) 10 MG tablet prn  Yes Yes   Sig: Take 10 mg by mouth every 6 hours as needed for nausea or vomiting   sertraline (ZOLOFT) 50 MG tablet 7/22/2019 at am Self Yes Yes   Sig: Take 50 mg by mouth daily      Facility-Administered Medications: None     Allergies   No Known Allergies    Social History    x 10 years. Lives with  in [place], [place]. Never smoker. 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       Family History   Mother with hypercholesterolemia  Father with hypercholesterolemia, HTN, arrhythmia requiring ablation  Great aunt with breast cancer  Aunt with uterine cancer  Uncle with prostate cancer      Physical Exam   Temp: 99.5  F (37.5  C) Temp src: Oral BP: 101/70 Pulse: 96   Resp: 18 SpO2: 94 % O2 Device: Nasal cannula Oxygen Delivery: 2 LPM  Vital Signs with Ranges  Temp:  [97.8  F (36.6  C)-99.5  F (37.5  C)] 99.5  F (37.5  C)  Pulse:  [] 96  Resp:  [16-18] 18  BP: (101-132)/(59-86) 101/70  SpO2:  [86 %-98 %] 94 %  160 lbs 3.2 oz  GEN:  Pleasant, alert, NAD, sitting in bed  LUNGS: Much improved, but still decreased air exchange left upper and lower lobes, R side CTA  COR: RRR, mildly tachycardic, no murmer  ABD: Distended, soft, non-tender, no palpable masses  EXT:  Bilateral hands with sloughing of skin on palmar surface of all digits.  Some areas peeling.  Skin intact and pink underneath.  No open areas.  Palms okay.       Data     [facility] from [facility]   7/22/19     EXAM:  CT chest, abdomen, and pelvis with IV contrast.    IMPRESSION:    1.  There  are new right-sided pulmonary emboli since the comparison exam.  Flattening of the intraventricular septum suggests elevated right sided cardiac pressures.  2.  Interval increase in the volume of left-sided pleural effusion.  The left lung is largely collapsed.  Rightward shift of the mediastinum.  3.  Interval increase in the volume of bilateral axillary and mediastinal lymph nodes.  While these are nonspecific, suspect progression of metastatic disease.  4.  Interval increase in the volume of hypodense ascites in the abdomen and pelvis.  The volume of adenopathy along the mesentery is also increased.  5.  . Interval removal of the filter from the inferior vena cava.  6.  Interval increase in the volume of a lymph node along the left external iliac chain.    ECHO 7/22/19  Interpretation Summary     Extremely poor quality study with limited visualization. Contrast not used.  Left ventricular systolic function is normal. The visual ejection fraction is  estimated at 55-60%.  The right ventricle is grossly normal size. The right ventricular systolic  function is borderline reduced.  There is no pericardial effusion. Large left pleural effusion.  Dilated IVC with minimal respirophasic variation.  No prior.

## 2019-07-24 NOTE — CONSULTS
Consultation    Graciela Adrian MRN# [id number]   YOB: 1970 Age: 48 year old   Date of Admission: 7/22/2019  Requesting physician: Dr. Diaz  Reason for consult: PE on DOAC           Assessment and Plan:     Graciela is a 48 year old admitted 7/22 with PE and right heart strain    1. PE   - Incidental PE 10/2018->started LMWH  - Did not like injections and requested alternative therapy-> started DOAC  - Routine follow up CT 7/22 showed new right sided PE with right heart strain  - Echocardiogram 7/22 EF 55-60%, mildly reduced right ventricular systolic function, no pericardial effusion  - Now on IV heparin for procedures  - Plan to resume LMWH 1 mg/kg every 12 hours    2. Anemia  - Likely from malignancy and chemotherapy  - Denies bleeding  - Stable overnight  - Discussed transfusion if needed. 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 Hall in [place]  - Has been getting pegylated doxorubicin and bevacizumab; last 6/26 (both agents), last bevacizumab 7/10  - CT showing progression of adenopathy and ascites as well as effusion  - Thoracentesis (2L) and paracentesis (2L) done 7/24  - Symptomatically improved  - Follow up with Dr. Hall to discuss next steps    Andre WILEY, [facility]  [phone]             Chief Complaint:   PE on DOAC         History of Present Illness:   Graciela is a 48 year old admitted 7/22 with PE and right heart strain    She had routine imaging and was found to have PE with heart strain. Looking back she has had increased dyspnea recently. No chest pain. No leg swelling. Graciela has been on apixaban since 12/2018 after 2 months of being LMWH for incidential PE 10/2018. She has been taking the apixaban regularly.    Last chemotherapy with pegylated doxorubicin/bevacizumab 6/26. Last bevacizumab 7/10. Routine screening CT 7/22.     Unfortunately her disease has increased, there is recurrent  ascites and worsening pleural effusion.     We were consulted for recommendations of anticoagulation.    ONCOLOGY HISTORY:  The chronology of her cancer diagnosis and treatment to date is as follows:   1) 10/01/2018: Ms. Adrian was seen by Dr. Elizabeth Howard,OB/GYN, for evaluation of abdominal enlargement, early satiety, and changes in menstruation.     2) 10/09/2018: Pelvic ultrasound demonstrated a right ovarian solid-cystic tumor mass and significant ascites raising the concern for carcinoma.    3) 10/16/2018: CT abdomen and pelvis with bilateral complex ovarian masses with solid and cystic components (right 9.4 x 7.1 x 7.3 cm, left 3.1 x 3.8 x 3.3 cm). Extensive soft tissue thickening along the omentum and fine omental nodularity. A small 6 mm ovoid intermediate density to low density lesion identified along the posterolateral margin of the spleen. Large volume pelvic and abdominal ascites.     4) 10/23/2018: Paracentesis with removal of 6.4 liters. Cytology positive for malignancy.     5) 10/21/2018: She saw Dr. SAMREEN Lo MD, Gynecology/Oncology in consultation Given the presentation with right ovarian mass and omental caking and ascites, Dr. Lo was highly suspicious of advanced ovarian cancer. She recommended neoadjuvant chemotherapy with Carbo/Taxol every 21 days for three cycles, followed by followup scan, possible cytoreductive surgery and then further chemotherapy.     5) 10/30/2018: CT chest for completion of staging revealed bilateral PE with relatively large clot burden. No finding of metastatic disease in the thorax. Benign right upper lobe nodule. The patient was started on therapeutic Lovenox at 1 mg/kg b.i.d.     6) 11/09/2018: Ultrasound guided therapeutic paracentesis with removal of 4 liters of fluid.    7) 11/01/2018: Neoadjuvant C#1 carbo/Taxol initiated.     8) 11/21/2018: C#2 carbo/Taxol.     9) 12/11/2018:  - 562.    10) 12/12/2018: C#3 carbo/Taxol.    11) 12/20/2018: CT CAP  "showed a small left pleural effusion with associated left lower lobe atelectasis, slightly increased, pulmonary emboli significantly improved with a small amount of residual thrombus in the right lower lobe, slightly decreased thickening of the omentum consistent with slightly improved metastatic disease. Bilateral adnexal and ovarian masses unchanged, right greater than left.     11) 2019:  272.    12) 2019: Dr. Lo performed exploratory laparotomy, FAY-BSO, omentectomy, aspiration of ascites, tumor debulking, and appendectomy. Pathology was consistent with bilateral ovarian clear cell carcinoma with metastatic spread to omentum, left pericolic peritoneum, and appendix. ER/MI 0%, Ki-67 20%.    13) 19:  156.    14) 2019: C#4 carboplatin + Taxol and Avastin.     15) 2019: C#5 carboplatin + Taxol and Avastin.     16) 2019 - C#6 carboplatin + Taxol and Avastin.    17) 2019: CT-CAP - Interval hysterectomy and bilateral oophorectomy.  Moderate left pleural effusion, increased in size. Resolution of the pulmonary emboli in the right lower lobe seen on the prior examination.  New inferior vena cava filter.  Moderate ascites, decreased.  Omentum no longer shows abnormal thickening or soft tissue attenuation.   Small mesenteric lymph nodes, unchanged.    17) 04/15/2019:  468.7.    18) 2019 -  462.7.     19) 2019 - C#1D1 second line Doxil/Avastin initiated.            Physical Exam:   Vitals were reviewed  Blood pressure 101/70, pulse 96, temperature 99.5  F (37.5  C), temperature source Oral, resp. rate 18, height 1.626 m (5' 4\"), weight 72.7 kg (160 lb 3.2 oz), SpO2 94 %.  Temperatures:  Current - Temp: 99.5  F (37.5  C); Max - Temp  Av.5  F (36.9  C)  Min: 97.8  F (36.6  C)  Max: 99.5  F (37.5  C)  Respiration range: Resp  Av.5  Min: 16  Max: 18  Pulse range: Pulse  Av.1  Min: 91  Max: 110  Blood pressure range: Systolic (24hrs), " Av , Min:101 , Max:132   ; Diastolic (24hrs), Av, Min:59, Max:86    Pulse oximetry range: SpO2  Av.8 %  Min: 86 %  Max: 98 %    Intake/Output Summary (Last 24 hours) at 2019 1644  Last data filed at 2019 1900  Gross per 24 hour   Intake 120 ml   Output --   Net 120 ml       GENERAL: No acute distress.  SKIN: No rashes or jaundice.  HEENT: Normocephalic, atraumatic. Eyes anicteric. Oropharynx is clear.  HEART: Regular rate and rhythm with no murmurs.  LUNGS: NC oxygen. Diminished on left. Looks comfortable. Conversational.  ABDOMEN: Distended. Hypoactive. Nontender.  EXTREMITIES: No clubbing, cyanosis, or edema.  MENTAL: Alert and oriented to person, place, and time.  NEURO: Cranial nerves II through XII grossly intact with no focal motor or sensory deficits.            Past Medical History:   I have reviewed this patient's past medical history  Past Medical History:   Diagnosis Date     Anxiety      Carcinomatosis (H)      Obesity (BMI 30-39.9)              Past Surgical History:   I have reviewed this patient's past surgical history  Past Surgical History:   Procedure Laterality Date     HYSTERECTOMY TOTAL ABD, DULCE SALPINGO-OOPHORECTOMY, NODE DISSECTION, TUMOR DEBULKING, COMBINED N/A 2019    Procedure: HYSTERECTOMY TOTAL ABDOMINAL, BILATERAL SALPINGO OOPHORECTOMY, OMENTECTOMY TUMOR DEBULKING, APPENDECTOMY;  Surgeon: Dina Lo MD;  Location:  OR     IR IVC FILTER PLACEMENT  2019     IR IVC FILTER REMOVAL  2019     LAPAROTOMY EXPLORATORY N/A 2019    Procedure: LAPAROTOMY EXPLORATORY;  Surgeon: Dina Lo MD;  Location:  OR     REMOVE FILTER INFERIOR VENA CAVA N/A 2019    Procedure: REMOVE FILTER INFERIOR VENA CAVA;  Surgeon: Stiven Aguirre MD;  Location:  OR               Social History:   I have reviewed this patient's social history  Social History     Tobacco Use     Smoking status: Not on file   Substance Use Topics     Alcohol  use: Not on file             Family History:   I have reviewed this patient's family history  No family history on file.          Allergies:   No Known Allergies          Medications:   I have reviewed this patient's current medications  Medications Prior to Admission   Medication Sig Dispense Refill Last Dose     apixaban ANTICOAGULANT (ELIQUIS) 5 MG tablet Take 5 mg by mouth 2 times daily   7/22/2019 at am     LORazepam (ATIVAN PO) Take 0.5-1 mg by mouth 2 times daily as needed for anxiety (sleep)    7/22/2019 at am     metoprolol succinate ER (TOPROL-XL) 25 MG 24 hr tablet Take 25 mg by mouth daily   7/22/2019 at am     prochlorperazine (COMPAZINE) 10 MG tablet Take 10 mg by mouth every 6 hours as needed for nausea or vomiting   prn     sertraline (ZOLOFT) 50 MG tablet Take 50 mg by mouth daily   7/22/2019 at am     Bevacizumab (AVASTIN IV) In clinic, chemo regimen   Past Month at Unknown time     magnesium oxide (MAG-OX) 400 MG tablet Take 400 mg by mouth daily   2 wks ago     potassium chloride ER (K-DUR/KLOR-CON M) 20 MEQ CR tablet Take 20 mEq by mouth daily   2 wks ago     Current Facility-Administered Medications Ordered in Epic   Medication Dose Route Frequency Last Rate Last Dose     acetaminophen (TYLENOL) tablet 650 mg  650 mg Oral Q4H PRN         calcium carbonate (TUMS) chewable tablet 1,000 mg  1,000 mg Oral Q4H PRN         glucose gel 15-30 g  15-30 g Oral Q15 Min PRN        Or     dextrose 50 % injection 25-50 mL  25-50 mL Intravenous Q15 Min PRN        Or     glucagon injection 1 mg  1 mg Subcutaneous Q15 Min PRN         heparin 100 UNIT/ML injection 5 mL  5 mL Intracatheter Q28 Days         heparin infusion 25,000 units in 0.45% NaCl 250 mL  1,100 Units/hr Intravenous Continuous 11 mL/hr at 07/24/19 0937 1,100 Units/hr at 07/24/19 0937     heparin lock flush 10 UNIT/ML injection 5-10 mL  5-10 mL Intracatheter Q24H   5 mL at 07/22/19 2212     heparin lock flush 10 UNIT/ML injection 5-10 mL  5-10 mL  Intracatheter Q1H PRN   5 mL at 07/23/19 1826     HOLD: enoxaparin (LOVENOX) pre-procedure INPATIENT   Does not apply HOLD         HOLD: heparin IV 4 hours pre-procedure   Does not apply HOLD         lidocaine (LMX4) cream   Topical Q1H PRN         lidocaine 1 % 0.1-1 mL  0.1-1 mL Other Q1H PRN         LORazepam (ATIVAN) tablet 0.5-1 mg  0.5-1 mg Oral BID PRN   0.5 mg at 07/23/19 2231     magnesium hydroxide (MILK OF MAGNESIA) suspension 30 mL  30 mL Oral Daily PRN         May take regular AM medications except those listed below.   Does not apply Continuous PRN         melatonin tablet 1 mg  1 mg Oral At Bedtime PRN         metoprolol succinate ER (TOPROL-XL) 24 hr tablet 25 mg  25 mg Oral Daily   25 mg at 07/24/19 0935     multivitamin w/minerals (THERA-VIT-M) tablet 1 tablet  1 tablet Oral Daily   1 tablet at 07/24/19 0935     naloxone (NARCAN) injection 0.1-0.4 mg  0.1-0.4 mg Intravenous Q2 Min PRN         ondansetron (ZOFRAN-ODT) ODT tab 4 mg  4 mg Oral Q6H PRN        Or     ondansetron (ZOFRAN) injection 4 mg  4 mg Intravenous Q6H PRN         Patient is already receiving anticoagulation with heparin, enoxaparin (LOVENOX), warfarin (COUMADIN)  or other anticoagulant medication   Does not apply Continuous PRN         potassium chloride (KAYCIEL) solution 20 mEq  20 mEq Oral Daily   20 mEq at 07/24/19 0935     potassium chloride (KLOR-CON) Packet 20-40 mEq  20-40 mEq Oral or Feeding Tube Q2H PRN   20 mEq at 07/23/19 1357     potassium chloride 10 mEq in 100 mL intermittent infusion with 10 mg lidocaine  10 mEq Intravenous Q1H PRN         potassium chloride 10 mEq in 100 mL sterile water intermittent infusion (premix)  10 mEq Intravenous Q1H PRN         potassium chloride 20 mEq in 50 mL intermittent infusion  20 mEq Intravenous Q1H PRN         potassium chloride ER (K-DUR/KLOR-CON M) CR tablet 20-40 mEq  20-40 mEq Oral Q2H PRN         prochlorperazine (COMPAZINE) tablet 10 mg  10 mg Oral Q6H PRN          senna-docusate (SENOKOT-S/PERICOLACE) 8.6-50 MG per tablet 1 tablet  1 tablet Oral BID PRN        Or     senna-docusate (SENOKOT-S/PERICOLACE) 8.6-50 MG per tablet 2 tablet  2 tablet Oral BID PRN         sertraline (ZOLOFT) tablet 50 mg  50 mg Oral Daily   50 mg at 07/24/19 0935     sodium chloride (PF) 0.9% PF flush 10-20 mL  10-20 mL Intracatheter q1 min prn         sodium chloride (PF) 0.9% PF flush 10-20 mL  10-20 mL Intracatheter Q1H PRN         sodium chloride (PF) 0.9% PF flush 3 mL  3 mL Intracatheter q1 min prn         No current [facility]-ordered outpatient medications on file.             Review of Systems:   The 10 point Review of Systems is negative other than noted in the HPI.            Data:   Data   Results for orders placed or performed during the hospital encounter of 07/22/19 (from the past 24 hour(s))   Potassium   Result Value Ref Range    Potassium 4.1 3.4 - 5.3 mmol/L   Basic metabolic panel   Result Value Ref Range    Sodium 138 133 - 144 mmol/L    Potassium 3.9 3.4 - 5.3 mmol/L    Chloride 106 94 - 109 mmol/L    Carbon Dioxide 26 20 - 32 mmol/L    Anion Gap 6 3 - 14 mmol/L    Glucose 86 70 - 99 mg/dL    Urea Nitrogen 7 7 - 30 mg/dL    Creatinine 0.71 0.52 - 1.04 mg/dL    GFR Estimate >90 >60 mL/min/[1.73_m2]    GFR Estimate If Black >90 >60 mL/min/[1.73_m2]    Calcium 8.3 (L) 8.5 - 10.1 mg/dL   CBC with platelets   Result Value Ref Range    WBC 5.5 4.0 - 11.0 10e9/L    RBC Count 3.00 (L) 3.8 - 5.2 10e12/L    Hemoglobin 7.8 (L) 11.7 - 15.7 g/dL    Hematocrit 26.3 (L) 35.0 - 47.0 %    MCV 88 78 - 100 fl    MCH 26.0 (L) 26.5 - 33.0 pg    MCHC 29.7 (L) 31.5 - 36.5 g/dL    RDW 22.8 (H) 10.0 - 15.0 %    Platelet Count 213 150 - 450 10e9/L   INR (AM Draw)   Result Value Ref Range    INR 1.24 (H) 0.86 - 1.14      Result Value Ref Range     84 (H) 0 - 30 U/mL   US Thoracentesis    Narrative     EXAM: US THORACENTESIS       7/24/2019 9:01 AM       HISTORY: Pleural effusion.      PROCEDURE:   Written informed consent was obtained from the patient  prior to the procedure. The risks and benefits including bleeding,  infection and pneumothorax were discussed and the patient wished to  continue. Initial ultrasound images demonstrated a left pleural fluid  collection. The skin overlying this collection was marked, prepped,  draped and anesthetized in usual sterile fashion utilizing 10 mL 1%  lidocaine .  Thoracentesis catheter was then placed into the pleural  fluid collection with return of  1950 mL dark eamon fluid. Patient  tolerated the procedure well. Followup chest x-ray was ordered.      US guidance was utilized to enter the left pleural space for  thoracentesis with permanent image recoding.      Impression    IMPRESSION:  Ultrasound-guided left thoracentesis. Patient experienced  some discomfort and fluid drainage was stopped, there appeared to be  some residual pleural fluid that was not drained.    AIMEE UDRÁN PA-C   US Paracentesis    Narrative    EXAM: US PARACENTESIS  7/24/2019 9:02 AM       HISTORY: Ascites.    PROCEDURE:  Written informed consent was obtained from the patient  prior to the procedure. The risks and benefits including bleeding,  infection and abdominal organ injury were discussed and the patient  wished to continue. Initial ultrasound images demonstrated a large  right lower quadrant fluid collection. The skin overlying this  collection was marked, prepped, draped and anesthetized in usual  sterile fashion utilizing 10 mL 1% lidocaine . The paracentesis  catheter was then placed into the peritoneal fluid collection with  return of 2150 mL of green-tinged, milky chylous fluid. Patient  tolerated the procedure well. The patient will receive intravenous  albumin on the usual sliding scale as needed per protocol.      US guidance was utilized to enter the peritoneal space for  paracentesis with permanent image recoding.      Impression    IMPRESSION:  Ultrasound-guided  paracentesis.      AIMEE DURÁN PA-C   XR Chest 1 View    Narrative    CHEST ONE VIEW  7/24/2019 9:05 AM     HISTORY: Status post left thoracentesis.    COMPARISON: None.      Impression    IMPRESSION:  Portable view of the chest is performed. There is no  pneumothorax following left thoracentesis. Small amount of residual  left pleural fluid is noted. Right lung is clear without significant  pleural fluid. Heart is normal in size. Right chest port is present  with catheter tip in the distal SVC in good position.    LINNEA ALANIZ MD   Heparin 10a Level   Result Value Ref Range    Heparin 10A Level 0.94 IU/mL

## 2019-07-24 NOTE — PLAN OF CARE
A/Ox4. VSS ex tachycardic. Tele - ST. Up Independently. Potassium recheck 4.1. Will start on heparin gtt at 2200 - will be stopped at 0400 and plan for paracentesis and thoracentesis tomorrow at 8AM.

## 2019-07-24 NOTE — PHARMACY
Re: Heparin transition to Lovenox - original plan was for Heparin to be stopped at 1900 and first dose of Lovenox at 2000 - PTT/Heparin 10a elevated on routine recheck - Per discussion with RN will have her stop Heparin now and give 1st dose of Lovenox at 1900 tonight

## 2019-07-24 NOTE — PROGRESS NOTES
Care Suites Admission Nursing Note    Reason for admission: Paracentesis and thoracentesis  CS arrival time: na  Accompanied by: tech  Name/phone of DC : sheyla  Medications held: IV heparin off since 0400  Consent signed: yes  Abnormal assessment/labs: no  If abnormal, provider notified: na  Education/questions answered: pt states understanding of both procedures  Plan: proceed.    Care Suites Procedure Nursing Note    Procedure: paracentesis and thoracentesis  Procedure started time: 0810  Procedure completed time:   Concerns/abnormal assessment:   Plan:       Care Suites Post-Procedure Note    Procedure: paracentesis and thoracentesis  CS arrival time: na  Accompanied by: me  Concerns/abnormal assessment after procedure: sl. 2150cc of Green tinged, milky fluid drained from RLQ. Dsg placed CDI.  0845 1950cc of dark eamon fluid drained from LLL.Pt experienced dome SOB and coughing.DSG placed CDI.  Plan: To X ray per cart. Chest X ray WNL per Robbie JOLLEY.  OK to restart Heparin IV per Robbie JOLLEY.  0067 Detailed report to Katharine BROWN on floor. Pt to return to room per cart with Rad transport.

## 2019-07-24 NOTE — CONSULTS
Medication coverage check for Enoxaparin. $10 copay monthly. No quantity restrictions.    Alison Foreman CphT  [facility] Discharge Pharmacy Liaison  Liaison Cell: [phone]

## 2019-07-24 NOTE — PLAN OF CARE
VSS on 1 L O2. Tele NSR. PRN ativan given x1 for anxiety at bedtime. Heparin started from 2200 and stopped at 0400. Port Hep locked. Up independently. Plan for paracentesis/thoracentesis today.

## 2019-07-24 NOTE — PLAN OF CARE
Pt A/O x4, tmax 100.0. Tele: ST denies pain/discomfort. This AM pt had 1950cc dark eamon output from LLQ paracentesis-site bandaged CDI;  2150cc Green milky output from RLQ thoracentesis-site bandaged CDI. LLL dim, RLL clear, SOB improved after centesis-but uable to wean off 02, 2L NC sats 91-93%.  Up IND, good UOP, BM this AM. Regular diet- TUMS PRN for indigestion. RIGHT chest port HL. Pt has peeling and erythema on hands r/t chemo. Plan: Re-start Lovenox 1900, monitor Hgb-transfuse as necessary, monitor O2 needs. Followed by Hem/Onc GYN.

## 2019-07-25 NOTE — PLAN OF CARE
Pt A/O x4, VSS, tele: NSR, able to wean off 02 sating low-mid 90's RA. RL clear, LL dim. BS present- passing flatus. BM 7/24. Denies pain/discomfort. No nausea/vomiting. Pt up ambulating hallway IND. Regular diet-fair appetite. Take supplemental drinks well.   RN reviewed discharge paperwork with pt. Pt has heparin injections at home and demonstrates appropriate administration and timing. Reviewed all PTA meds-questions answered. Pt will make f/unit(s) with primary within 1 week for HGb check- last 7.8 7/25. Port was HL with high dose heparin and removed without incident. Pt discharged via w/c with personal belongings to home with a ride from parents

## 2019-07-25 NOTE — CONSULTS
[facility]    GYN Oncology Consultation     Date of Admission:  7/22/2019    Assessment & Plan   1) Clear cell ovarian cancer:  Currently receiving second-line Doxil/Avastin for recurrent, progressive clear cell carcinoma.  Has received 3 cycles thus far, with the most recent being Doxil/avastin on 6/26, and Avastin only on 7/10.  Unfortunately most recent CT scan from 7/22/19 (done in [facility]) shows disease progression, with increase in bilateral axillary, mediastinal, mesentary and left external iliac lymph nodes, and increase in volume of ascites.   has been declining slowly, however.   Will need to discuss change to third-line, once discharged from currently hospitalization. Will consider PD-L1 and MSI testing to determine eligibility for pembrolizumab. Will set up outpatient appointment with Dr. Lo for treatment planning.     2) Pulmonary embolism:  She had PE originally in October of 2018, was started on Lovenox and transitioned to Eliquis two months ago.  Most recent CT showed new R sided PE's despite prophylaxis.  She is back on lovenox now. Will remain on this, rather than transition to oral, in future.     3) Pleural effusion:  Was found incidentally on CT from 7/22/19.  Had thoracentesis yesterday removing 1,950mL.   Symptomatically better today.  Will arrange for out patient thoracentesis if fluid re-accummulates in future.     4) anemia:  Chemo-induced.  Hgb remains steady at 7.8 for the third day. Heme-onc weighed in and we will continue to monitor for now.     5) ascites:  She has been getting regular paracenteses up in [facility].  Last one was yesterday, resulting in 1,950mL.  Will order this as PRN on outpatient basis.     5) palmar/plantar erythema:  This is a side effect from the doxil.  No secondary infection.  Emollients, steroid if needed.  Watch carefully.     Disposition:  Okay for discharge from our perspective, once cleared by hospitalist.  We can arrange for OP  thoracentesis if PE recurs.      Mary Kate Rivas, APRN CNP  [phone]    Code Status    Full Code    Reason for Consult   History of ovarian clear     Chief Complaint   Pt transferred from [facility] last night for acute finding on CT of signifcant pleural effusion and new PE's causing heart strain.     History of Present Illness   10/1/18 Seen by Dr. Howard for an annual exam. C/o light menses, abdominal enlargement and early satiety   10/9/18 PUS: Slightly bulky 10 cm with several small fibroids. Endometrial stripe 4 mm. Right ovary enlarged measuring 9.3 x 8.9 x 7.3 cm with solid area measuring 8.4 x 6.4 x 6.4 cm and a cystic component measuring 4 cm. Left ovary normal measuring 3.1 x 2.9 x 2.3 cm with several small follicles.   10/16/18 CT A/P: Bilateral complex pelvic masses with solid and cystic components (right 9.4 x 7.1 x 7.3 cm, left 3.1 x 3.8 x 3.3 cm). Extensive soft tissue thickening along the omentum and fine omental nodularity. Small 6 mm ovoid intermediate density to low density lesion is identified along the posterolateral margin of the spleen, large volume pelvic and abdominal ascites.   10/23/18: Paracentesis, 6.4 L removed. Cytology positive for malignancy.   10/30/18: CT chest (done for completion staging) has incidental finding of bilateral PE with relatively large clot burden. No finding of matted static disease in the thorax. Benign right upper lobe nodule. Pt started on Lovenox.   10/30/18:  = 482.1 U/mL   11/1/18: C1 neoadjuvant Carboplatin/Taxol (in [place]).   11/9/18: Paracentesis: 4 L removed.   11/21/18: C2 Carboplatin/Taxol   12/11/18:  = 562.3 U/mL   12/12/18: C3 Carboplatin/Taxol   12/20/18: CT C/A/P: Small left pleural effusion with associated left lower lobe atelectasis, slightly increased. Pulmonary emboli appear to have significantly improved with a small amount of residual thrombus in the right lower lobe. Slightly decreased thickening of the omentum consistent  with slightly improved metastatic disease. Bilateral adnexal ovarian masses, right greater than left. These are unchanged. Uterine fibroids, unchanged.   1/17/19 X-lap, TAHBSO, omentectomy, aspiration of ascites, tumor debulking, appendectomy.   Pathology: Bilateral ovarian clear cell carcinomas with metastatic spread to omentum, left pericolic peritoneum, appendix.   2/1/19:  = 156 U/mL   2/19/19: C4 Carboplatin/Taxol/Bevacizumab   3/12/19: C5 Carboplatin/Taxol/Bevacizumab   3/20/19: Genetic testing done: No actionable mutations. BRCA 1/2 NEG   4/4/19: C6 Carboplatin/Taxol/Bevacizumab   4/9/19: CT C/A/P: Moderate left pleural effusion, increased in size. Left lower lobe and lingular atelectasis. Resolution of pulmonary emboli. Moderate ascites, decreased. Omentum no longer shows abnormal thickening or soft tissue attenuation. Small mesenteric lymph nodes, unchanged. IVC filter in place   4/15/19:  = 468.7 U/mL  4/22/19: IVC filter removed  4/29/19: MUGA  EF 64%  4/30/19: C1,D1 Doxil/avastin  5/28/19: C2,D1 doxil/avastin   = 350.4  6/26/19: C3,D1 doxil/avastin   = 157.4 U/mL  7/15/19: MUGA - EF 45.5%  7/22/19:  = 120.2 U/mL     Subjective Hx:   Pt continues to feel very well.  No SOB or chest pain now.  Just a slight dry cough from after the thoracentesis.  Abdomen feels better, too, less tight.  Appetite is great.  Skin on hands unchanged from yesterday.     Past Medical History   Obesity BMI 36.39 kg/m2  Anxiety  Stage IIIC clear cell carcinoma of bilateral ovaries  PE    Past Surgical History   IVC filter placement pre-op  1/17/19 X-lap, TAHBSO, omentectomy, aspiration of ascites, tumor debulking, appendectomy    Prior to Admission Medications   Prior to Admission Medications   Prescriptions Last Dose Informant Patient Reported? Taking?   Bevacizumab (AVASTIN IV)   Yes No   Sig: In clinic, chemo regimen   LORazepam (ATIVAN PO) 7/22/2019 at am Self Yes Yes   Sig: Take 0.5-1 mg by mouth 2  times daily as needed for anxiety (sleep)    apixaban ANTICOAGULANT (ELIQUIS) 5 MG tablet 7/22/2019 at am  Yes Yes   Sig: Take 5 mg by mouth 2 times daily   magnesium oxide (MAG-OX) 400 MG tablet 2 wks ago Self Yes No   Sig: Take 400 mg by mouth daily   metoprolol succinate ER (TOPROL-XL) 25 MG 24 hr tablet 7/22/2019 at am  Yes Yes   Sig: Take 25 mg by mouth daily   potassium chloride ER (K-DUR/KLOR-CON M) 20 MEQ CR tablet 2 wks ago Self Yes No   Sig: Take 20 mEq by mouth daily   prochlorperazine (COMPAZINE) 10 MG tablet prn  Yes Yes   Sig: Take 10 mg by mouth every 6 hours as needed for nausea or vomiting   sertraline (ZOLOFT) 50 MG tablet 7/22/2019 at am Self Yes Yes   Sig: Take 50 mg by mouth daily      Facility-Administered Medications: None     Allergies   No Known Allergies    Social History    x 10 years. Lives with  in [place], [place]. Never smoker. 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       Family History   Mother with hypercholesterolemia  Father with hypercholesterolemia, HTN, arrhythmia requiring ablation  Great aunt with breast cancer  Aunt with uterine cancer  Uncle with prostate cancer      Physical Exam   Temp: 95.8  F (35.4  C) Temp src: Oral BP: 111/79 Pulse: 102   Resp: 18 SpO2: (P) 97 % O2 Device: (P) None (Room air) Oxygen Delivery: 2 LPM  Vital Signs with Ranges  Temp:  [95.8  F (35.4  C)-100  F (37.8  C)] 95.8  F (35.4  C)  Pulse:  [] 102  Resp:  [18] 18  BP: (101-113)/(59-79) 111/79  SpO2:  [86 %-97 %] (P) 97 %  160 lbs 3.2 oz  GEN:  Pleasant, alert, NAD, sitting in bed  LUNGS: Much improved, but still decreased air exchange left upper and lower lobes, R side CTA  COR: RRR, mildly tachycardic, no murmer  ABD: Distended, soft, non-tender, no palpable masses  EXT:  Bilateral hands with sloughing of skin on palmar surface of all digits.  Some areas peeling.  Skin intact and pink underneath.  No open areas.  Palms okay.       Data     CT from [facility]    7/22/19     EXAM:  CT chest, abdomen, and pelvis with IV contrast.    IMPRESSION:    1.  There are new right-sided pulmonary emboli since the comparison exam.  Flattening of the intraventricular septum suggests elevated right sided cardiac pressures.  2.  Interval increase in the volume of left-sided pleural effusion.  The left lung is largely collapsed.  Rightward shift of the mediastinum.  3.  Interval increase in the volume of bilateral axillary and mediastinal lymph nodes.  While these are nonspecific, suspect progression of metastatic disease.  4.  Interval increase in the volume of hypodense ascites in the abdomen and pelvis.  The volume of adenopathy along the mesentery is also increased.  5.  . Interval removal of the filter from the inferior vena cava.  6.  Interval increase in the volume of a lymph node along the left external iliac chain.    ECHO 7/22/19  Interpretation Summary     Extremely poor quality study with limited visualization. Contrast not used.  Left ventricular systolic function is normal. The visual ejection fraction is  estimated at 55-60%.  The right ventricle is grossly normal size. The right ventricular systolic  function is borderline reduced.  There is no pericardial effusion. Large left pleural effusion.  Dilated IVC with minimal respirophasic variation.  No prior.

## 2019-07-25 NOTE — PLAN OF CARE
Pt A&O x4. VSS on 2L O2. No SOB or nausea.Tele NSR. Port hep locked. Thora and para sites CDI. Lotion applied to hands. Slept well overnight. Up independently. Plan to check hgb and wean O2 to RA.

## 2019-07-25 NOTE — DISCHARGE SUMMARY
[facility]    Discharge Summary  Hospitalist    Date of Admission:  7/22/2019  Date of Discharge:  7/25/2019  2:43 PM  Discharging Provider: Thais Diaz MD  Date of Service (when I saw the patient): 07/25/19    Discharge Diagnoses   New right sided Pulmonary embolism  Acute hypoxic respiratory failure- resolved  Metastatic ovarian cancer  Left- sided pleural effusion  Recurrent Malignant ascites  Hypokalemia  Anemia  HTN  Anxiety  Palmar/plantar erythema    History of Present Illness   Graciela Adrian is a very pleasant 48 year old female with PMH of clear cell ovarian cancer with omental mets, HTN, anxiety who was a direct admission after she was found to have a new pulmonary embolism (noted on CT chest during routine follow-up with her oncologist); for a detailed HPI- please refer to H&P done by Dr Tavo Rogers on 07/22/2019.       Hospital Course   Graciela Adrian was admitted on 7/22/2019.  The following problems were addressed during her hospitalization:    # New right sided Pulmonary embolism  # Acute hypoxic respiratory failure- resolved  - In setting of malignancy; initial pulmonary emboli were diagnosed in October 2018.  She had been on Lovenox until 2 months ago when she changed to Eliquis  - CT chest at clinic ([facility]) noted with new right-sided PE with suggestion of elevated right heart pressures  - started initially on Lovenox 1 mg/kg BID dosing, then switched to heparin drip given planned thoracentesis/paracentesis  - ECHO mostly unremarkable with no significant right heart strain  - Hematology consulted- and recommended to switch back on Lovenox; the patient is familiar with Lovenox shots administration and has enough supply at home  - she was able to be weaned off O2 at the time of the discharge.     # Clear cell ovarian cancer with metastases  - She is followed by Dr. Lo with Gyn/ Onc  - CT abd/pelvis in the clinic noted with interval increase in volume of  left-sided pleural effusion with left lung largely collapsed and rightward shift of mediastinum, interval increase in bilateral axillary and mediastinal lymph nodes with suspected progression of metastatic disease, also concern for liver lesions  - oncology consulted; plans for outpatient chemo.    # L sided pleural effusion  # Recurrent Malignant ascites  - Worsening L sided pleural effusion seen on CT 7/22; has been dyspneic on exertion and also notes early satiety and some nausea, likely sec to ascites  - underwent US guided thoracentesis on 7/24/19 with 1950 cc fluid removed  - also s/p paracentesis with 2150 cc ascitic fluid removed (had paracentesis in the past)  - she reported improvement of her symptoms after the procedures.  - GYN/Onc- will arrange for outpatient paracentesis prn as outpatient     # Hypokalemia  - potassium 3.2 on admission  - started on K replacement protocol  - continue PTA KCl supplementation after discharge     # HTN  - Continue PTA metoprolol XL 25 mg daily     # Anxiety  - continue PTA sertraline 50 mg daily and prn lorazepam     # Anemia, likely related to malignancy, chronic  - baseline Hb seems around 9-10  - Hb here stable at 7.8  - follow up with GYN/Onc and repeat Hb next week     # Palmar/plantar erythema  - as per Oncology- this is a side effect from the doxil; as per Oncology-if any signs of blistering on skin from PPE then consider topical steroid. Otherwise, include lotions and preventive methods such as avoidance of trauma, pressure, tight clothing, and heat.       Thais Diaz MD    Significant Results and Procedures   Echocardiogram 07/23/2019    Extremely poor quality study with limited visualization. Contrast not used.  Left ventricular systolic function is normal. The visual ejection fraction is  estimated at 55-60%.  The right ventricle is grossly normal size. The right ventricular systolic  function is borderline reduced.  There is no pericardial effusion.  Large left pleural effusion.  Dilated IVC with minimal respirophasic variation.    US Paracentesis  US Thoracentesis    Pending Results   None  Unresulted Labs Ordered in the Past 30 Days of this Admission     No orders found from 6/22/2019 to 7/23/2019.          Code Status   Full Code       Primary Care Physician   Dina Lo    Physical Exam   Temp: 95.8  F (35.4  C) Temp src: Oral BP: 111/79 Pulse: 102   Resp: 18 SpO2: (P) 97 % O2 Device: (P) None (Room air) Oxygen Delivery: 2 LPM  Vitals:    07/22/19 2000   Weight: 72.7 kg (160 lb 3.2 oz)     Vital Signs with Ranges  Temp:  [95.8  F (35.4  C)-100  F (37.8  C)] 95.8  F (35.4  C)  Pulse:  [] 102  Resp:  [18] 18  BP: (101-111)/(70-79) 111/79  SpO2:  [86 %-97 %] (P) 97 %  No intake/output data recorded.    Constitutional: Awake, alert, NAD  Respiratory:   diminished air entry left base, no rales, no wheezing  Cardiovascular: S1S2, mild tachycardia, no rubs  GI: abd- soft, mildly distended, nontender, BS present  Skin/Integumen: sloughing of the skin over her fingers  Neuro: awake, alert, orientedX3, no FNDs  Psych- normal mood, normal affect    Discharge Disposition   Discharged to home  Condition at discharge: Good    Consultations This Hospital Stay   GYNECOLOGIC ONCOLOGY IP CONSULT  PHARMACY TO DOSE HEPARIN  PHARMACY LIAISON FOR MEDICATION COVERAGE CONSULT  HEMATOLOGY & ONCOLOGY IP CONSULT    Time Spent on this Encounter   Thais BARRETT, personally saw the patient today and spent greater than 30 minutes discharging this patient.    Discharge Orders      Reason for your hospital stay    Right sided Pulmonary embolism  Left sided pleural effusion s/p thoracentesis  Recurrent ascites     Follow-up and recommended labs and tests     Follow up with Dr Hall within 1 week; recommend repeat Hb   Chemotherapy as per GYN/Oncology.     Activity    Your activity upon discharge: activity as tolerated     When to contact your care team    Call your  primary doctor if you have any of the following: temperature greater than 100.5 or less than 96, increased shortness of breath, dizziness, loss of consciousness, chest pain, abnormal bleeding, bloody stools, black stools, bloody urine.     Full Code     Diet    Follow this diet upon discharge: Orders Placed This Encounter      Snacks/Supplements Adult: Other; Strawberry smoothie at 2 pm (RD); Between Meals      Combination Diet Regular Diet Adult     Discharge Medications   Current Discharge Medication List      START taking these medications    Details   enoxaparin (LOVENOX) 80 MG/0.8ML syringe Inject 0.73 mLs (73 mg) Subcutaneous every 12 hours         CONTINUE these medications which have NOT CHANGED    Details   LORazepam (ATIVAN PO) Take 0.5-1 mg by mouth 2 times daily as needed for anxiety (sleep)       metoprolol succinate ER (TOPROL-XL) 25 MG 24 hr tablet Take 25 mg by mouth daily      prochlorperazine (COMPAZINE) 10 MG tablet Take 10 mg by mouth every 6 hours as needed for nausea or vomiting      sertraline (ZOLOFT) 50 MG tablet Take 50 mg by mouth daily      Bevacizumab (AVASTIN IV) In clinic, chemo regimen      magnesium oxide (MAG-OX) 400 MG tablet Take 400 mg by mouth daily      potassium chloride ER (K-DUR/KLOR-CON M) 20 MEQ CR tablet Take 20 mEq by mouth daily         STOP taking these medications       apixaban ANTICOAGULANT (ELIQUIS) 5 MG tablet Comments:   Reason for Stopping:             Allergies   No Known Allergies  Data   Most Recent 3 CBC's:  Recent Labs   Lab Test 07/25/19  0600 07/24/19  0557 07/23/19  0600   WBC 5.5 5.5 4.8   HGB 7.8* 7.8* 7.8*   MCV 87 88 87    213 190      Most Recent 3 BMP's:  Recent Labs   Lab Test 07/24/19  0557 07/23/19  1820 07/23/19  0600 04/22/19  0657  01/19/19  0724 01/18/19  0730     --  139  --   --   --  138   POTASSIUM 3.9 4.1 3.2*  --   --   --  3.8   CHLORIDE 106  --  105  --   --   --  104   CO2 26  --  26  --   --   --  25   BUN 7  --  8  --    --   --  8   CR 0.71  --  0.74 0.73   < >  --  0.68   ANIONGAP 6  --  8  --   --   --  9   WALDEMAR 8.3*  --  8.2*  --   --   --  8.4*   GLC 86  --  84  --   --  131* 136*    < > = values in this interval not displayed.     Most Recent 2 LFT's:  Recent Labs   Lab Test 01/18/19  0730   AST 9   ALT 20   ALKPHOS 41   BILITOTAL 0.2     Most Recent INR's and Anticoagulation Dosing History:  Anticoagulation Dose History     Recent Dosing and Labs Latest Ref Rng & Units 10/23/2018 1/17/2019 4/22/2019 7/24/2019    INR 0.86 - 1.14 1.21(H) 1.03 1.07 1.24(H)        Most Recent 3 Troponin's:No lab results found.  Most Recent Cholesterol Panel:No lab results found.  Most Recent 6 Bacteria Isolates From Any Culture (See EPIC Reports for Culture Details):No lab results found.  Most Recent TSH, T4 and A1c Labs:No lab results found.  Results for orders placed or performed during the hospital encounter of 07/22/19   US Paracentesis    Narrative    EXAM: US PARACENTESIS  7/24/2019 9:02 AM       HISTORY: Ascites.    PROCEDURE:  Written informed consent was obtained from the patient  prior to the procedure. The risks and benefits including bleeding,  infection and abdominal organ injury were discussed and the patient  wished to continue. Initial ultrasound images demonstrated a large  right lower quadrant fluid collection. The skin overlying this  collection was marked, prepped, draped and anesthetized in usual  sterile fashion utilizing 10 mL 1% lidocaine . The paracentesis  catheter was then placed into the peritoneal fluid collection with  return of 2150 mL of green-tinged, milky chylous fluid. Patient  tolerated the procedure well. The patient will receive intravenous  albumin on the usual sliding scale as needed per protocol.      US guidance was utilized to enter the peritoneal space for  paracentesis with permanent image recoding.      Impression    IMPRESSION:  Ultrasound-guided paracentesis.      IAIN STINSON  Thoracentesis    Narrative     EXAM: US THORACENTESIS       7/24/2019 9:01 AM       HISTORY: Pleural effusion.      PROCEDURE:  Written informed consent was obtained from the patient  prior to the procedure. The risks and benefits including bleeding,  infection and pneumothorax were discussed and the patient wished to  continue. Initial ultrasound images demonstrated a left pleural fluid  collection. The skin overlying this collection was marked, prepped,  draped and anesthetized in usual sterile fashion utilizing 10 mL 1%  lidocaine .  Thoracentesis catheter was then placed into the pleural  fluid collection with return of  1950 mL dark eamon fluid. Patient  tolerated the procedure well. Followup chest x-ray was ordered.      US guidance was utilized to enter the left pleural space for  thoracentesis with permanent image recoding.      Impression    IMPRESSION:  Ultrasound-guided left thoracentesis. Patient experienced  some discomfort and fluid drainage was stopped, there appeared to be  some residual pleural fluid that was not drained.    AIMEE DURÁN PA-C   XR Chest 1 View    Narrative    CHEST ONE VIEW  7/24/2019 9:05 AM     HISTORY: Status post left thoracentesis.    COMPARISON: None.      Impression    IMPRESSION:  Portable view of the chest is performed. There is no  pneumothorax following left thoracentesis. Small amount of residual  left pleural fluid is noted. Right lung is clear without significant  pleural fluid. Heart is normal in size. Right chest port is present  with catheter tip in the distal SVC in good position.    LINNEA ALANIZ MD

## 2019-09-19 PROBLEM — R65.21 SEPTIC SHOCK (H): Status: ACTIVE | Noted: 2019-01-01

## 2019-09-19 PROBLEM — A41.9 SEPTIC SHOCK (H): Status: ACTIVE | Noted: 2019-01-01

## 2019-09-20 NOTE — PHARMACY-VANCOMYCIN DOSING SERVICE
Pharmacy Vancomycin Initial Note  Date of Service 2019  Patient's  1970  49 year old, female    Indication: Sepsis    Current estimated CrCl = Estimated Creatinine Clearance: 92.6 mL/min (based on SCr of 0.71 mg/dL).    Creatinine for last 3 days  No results found for requested labs within last 72 hours.    Recent Vancomycin Level(s) for last 3 days  No results found for requested labs within last 72 hours.      Vancomycin IV Administrations (past 72 hours)      No vancomycin orders with administrations in past 72 hours.                Nephrotoxins and other renal medications (From now, onward)    Start     Dose/Rate Route Frequency Ordered Stop    19 0100  piperacillin-tazobactam (ZOSYN) 4.5 g vial to attach to  mL bag      4.5 g  over 30 Minutes Intravenous EVERY 6 HOURS 19 2307      19 2330  vancomycin (VANCOCIN) 1000 mg in dextrose 5% 200 mL PREMIX      1,000 mg  200 mL/hr over 1 Hours Intravenous EVERY 8 HOURS 19 2322      19 2230  norepinephrine (LEVOPHED) 16 mg in  mL infusion      0.03-0.4 mcg/kg/min × 72.7 kg  2-27.3 mL/hr  Intravenous CONTINUOUS 19 2228            Contrast Orders - past 72 hours (72h ago, onward)    None                Plan:  1.  Start vancomycin  1000 mg IV q8h.   2.  Goal Trough Level: 15-20 mg/L   3.  Pharmacy will check trough levels as appropriate in 1-3 Days.    4. Serum creatinine levels will be ordered daily for the first week of therapy and at least twice weekly for subsequent weeks.    5. Villard method utilized to dose vancomycin therapy: Method 1    Jose Merchant [facility]

## 2019-09-20 NOTE — PHARMACY-ADMISSION MEDICATION HISTORY
Admission medication history interview status for the 9/19/2019  admission is complete. See EPIC admission navigator for prior to admission medications     Medication history source reliability:Moderate - patient &     Actions taken by pharmacist (provider contacted, etc): Reviewed CareEverywhere records and [facility]     Additional medication history information not noted on PTA med list : She reports taking Lovenox 0.7ml BID but otherwise does not appear to take medications on consistent basis. She stopped metoprolol and sertraline about 1 month ago due to constant N/V and not feeling well and does not consistently take Mg, KCl or NaCl due to difficultly swallowing and N/V as well.    Medication reconciliation/reorder completed by provider prior to medication history? No    Time spent in this activity: 15 min    Prior to Admission medications    Medication Sig Last Dose Taking? Auth Provider   enoxaparin (LOVENOX) 80 MG/0.8ML syringe Inject 70 mg Subcutaneous every 12 hours  9/19/2019 at am Yes Thais Diaz MD   LORazepam (ATIVAN) 0.5 MG tablet Take 0.5 mg by mouth 2 times daily as needed for anxiety 9/18/2019 at pm Yes Unknown, Entered By History   magnesium oxide (MAG-OX) 400 MG tablet Take 400 mg by mouth daily Past Week at Unknown time Yes Reported, Patient   potassium chloride ER (K-DUR/KLOR-CON M) 20 MEQ CR tablet Take 20 mEq by mouth daily Past Week at Unknown time Yes Reported, Patient   prochlorperazine (COMPAZINE) 10 MG tablet Take 10 mg by mouth every 6 hours as needed for nausea or vomiting Past Week at Unknown time Yes Unknown, Entered By History   sodium chloride 1 GM tablet Take 1 g by mouth daily Past Week at Unknown time Yes Unknown, Entered By History   metoprolol succinate ER (TOPROL-XL) 25 MG 24 hr tablet Take 25 mg by mouth daily More than a month at Unknown time  Unknown, Entered By History   sertraline (ZOLOFT) 50 MG tablet Take 50 mg by mouth daily More than a month at  Unknown time  Reported, Patient

## 2019-09-20 NOTE — PROGRESS NOTES
Critical Care Progress Note      09/20/2019    Name: Graciela Adrian MRN#: [id number]   Age: 49 year old YOB: 1970     Hsptl Day# 1  ICU DAY #1    MV DAY #0             Problem List:   Active Problems:    Septic shock (H)  hyponatremia 126, stable  hyperlactemia  Elevated LFTs  thrombocytopenia         Summary/Hospital Course:     49F admitted 9/19 from [facility] ED where she was found to be hypotensive requiring pressors.  Suspect septic shock.    On my visit she is essentially asymptomatic.  She denies fevers, abd pain, chest pain, shortness of breath, coughing, nausea/vomiting, dysuria.  She has had diarrhea at home for which she takes immodium, but apparently this is fairly chronic and unchanged today.      Assessment and plan :     Graciela Adrian IS a 49 year old female admitted on 9/19/2019 for shock, probably septic.   I have personally reviewed the daily labs, imaging studies, cultures and discussed the case with referring physician and consulting physicians.     My assessment and plan by system for this patient is as follows:    Neurology/Psychiatry:   1. Analgesia: prn oxycodone.  Hold off on acetaminophen until lft's improve.    Cardiovascular:   1.   Shock:  Suspect septic.  Lactic acid level slowly clearing -- 3.4 this morning.  Still requiring 3 pressors.  Ical 3.8--repleting.  S/p adequate fluid resuscitation.  Echo today.    Pulmonary/Ventilator Management:   1. Hypoxemia:  In setting of sepsis.  Doing well on nasal cannula.    GI and Nutrition :   1. Clears ok for today. Advance as tolerated  2. Elevated LFTs:  Suspect shock liver vs infiltration of liver by malignancy.  Monitor.    Renal/Fluids/Electrolytes:   1. Johan:  Creat elevated to 1.10.  In setting of shock.  Monitor uop and creatinine.  2. HypoNa:  Gradual repletion.  Trend.    Infectious Disease:   1. Septic source:  Unknown.  Pan culturing.  Diagnostic para.  Possible diagnostic thora but would hold off for now with low  "platelets, also suspect against pleural effusion as source as this area isn't regularly instrumented (unlike her abdominal fluid).  Continue empiric vanco/zosyn/azithro.  Send c diff if has diarrhea here, but it sounds like her diarrhea is not acute, and her abdominal exam and imaging are benign.    Endocrine:   1. Stress induced hyperglycemia: prn insulin    Hematology/Oncology:   1. Wbc mildly elevated:  12.1  2. Anemia to 9.4  3. Thrombocytopenia:  To 21 and inr 2.25.  Sepsis induced vs dic vs dysfunction of liver in setting of tumor infiltration.  Liver is now diffusely low density on CT scan whereas before there were only regions of this.  Read as \"fatty infiltration\" but suspicious for tumor.  No abnormal reds on diff and fibrinogen ok.  Check peripheral smear.  3.  Ovarian cancer:  Widely metastatic.  Suspect her liver may be significantly infiltrated with disruption of liver synthetic processes (see above).  Consulted [place] [facility]--pt of Dr. Lo as outpt.  4.  H/o PE on CT scan 7/22.  Holding AC for now in setting of thrombocytopenia.  On therapeutic lovenox at home --  Restart this if pltlts recover.     IV/Access:   1. Venous access - port  2. Arterial access - a-line    ICU Prophylaxis:   1. DVT: mechanical  2. Feeding - clears today  3. Family Update: pt herself at bedside today  4. Disposition - ICU           Interim History:     No new events since arrival.  See above.         Key Medications:       azithromycin  500 mg Intravenous Q24H     dextrose  25 mL Intravenous Once     hydrocortisone sodium succinate PF  100 mg Intravenous Q8H     piperacillin-tazobactam  4.5 g Intravenous Q6H     vancomycin (VANCOCIN) IV  1,000 mg Intravenous Q8H       lactated ringers       norepinephrine 0.4 mcg/kg/min (09/20/19 0200)     phenylephrine 1.5 mcg/kg/min (09/20/19 0521)     vasopressin (PITRESSIN) infusion ADULT (40 mL) 2.4 Units/hr (09/20/19 0120)               Physical Examination:   Temp:  [97.6  F (36.4 "  C)-98  F (36.7  C)] 97.8  F (36.6  C)  Pulse:  [] 89  Heart Rate:  [] 92  Resp:  [12-48] 15  BP: ()/() 92/65  MAP:  [52 mmHg-82 mmHg] 76 mmHg  Arterial Line BP: ()/(38-69) 99/65  SpO2:  [85 %-100 %] 100 %    Intake/Output Summary (Last 24 hours) at 9/20/2019 0854  Last data filed at 9/20/2019 0600  Gross per 24 hour   Intake 2250 ml   Output 650 ml   Net 1600 ml     Wt Readings from Last 4 Encounters:   09/20/19 70.2 kg (154 lb 12.2 oz)   07/22/19 72.7 kg (160 lb 3.2 oz)   04/22/19 81.6 kg (180 lb)   01/20/19 82.6 kg (182 lb)     Arterial Line BP: ()/(38-69) 99/65  MAP:  [52 mmHg-82 mmHg] 76 mmHg  BP - Mean:  [] 80  Resp: 15    No lab results found in last 7 days.    GEN: no acute distress, pleasant   HEENT: head ncat, sclera anicteric, OP patent, trachea midline   PULM: unlabored, clear anteriorly    CV/COR: RRR S1S2 no gallop,  No rub, no murmur  ABD: soft nontender, hypoactive bowel sounds, no mass  EXT:  No Edema x4,  Warm x4  NEURO: awake, alert, oriented, rapid/fluent/appropriate speech, normal str/sens x4.  SKIN: no obvious rash  LINES: clean, dry intact         Data:   All data and imaging reviewed     ROUTINE ICU LABS (Last four results)  CMP  Recent Labs   Lab 09/20/19  0430 09/19/19  2300   * 126*   POTASSIUM 5.4* 5.0   CHLORIDE 100 99   CO2 15* 18*   ANIONGAP 11 9   * 117*   BUN 44* 45*   CR 1.10* 1.13*   GFRESTIMATED 58* 57*   GFRESTBLACK 68 66   WALDEMAR 6.1* 6.0*   MAG  --  3.4*   PROTTOTAL 3.8* 3.4*   ALBUMIN 0.9* 0.9*   BILITOTAL 1.3 1.2   ALKPHOS 503* 451*   * 782*   * 130*     CBC  Recent Labs   Lab 09/20/19  0430 09/19/19 2300   WBC 12.1* 11.9*   RBC 3.57* 3.26*   HGB 9.4* 8.7*   HCT 30.6* 28.3*   MCV 86 87   MCH 26.3* 26.7   MCHC 30.7* 30.7*   RDW 23.0* 23.1*   PLT 21* 21*     INR  Recent Labs   Lab 09/20/19  0430 09/19/19 2300   INR 2.25* 2.44*     Arterial Blood GasNo lab results found in last 7 days.    All cultures:  Recent  Labs   Lab 09/19/19  2342 09/19/19  2300   CULT No growth after 4 hours No growth after 4 hours     Recent Results (from the past 24 hour(s))   CT Chest Abdomen Pelvis w/o Contrast    Narrative    CT CHEST ABDOMEN PELVIS W/O CONTRAST  9/20/2019 1:08 AM    HISTORY:  Septic shock; assess ascites and pleural effusion.    TECHNIQUE: CT scan obtained of the chest, abdomen, and pelvis without  IV contrast. Radiation dose for this scan was reduced using automated  exposure control, adjustment of the mA and/or kV according to patient  size, or iterative reconstruction technique.    COMPARISON:  None.    FINDINGS:    Chest: There is a very large left pleural effusion. Small right  pleural effusion. There is associated atelectasis bilaterally.  Calcified granulomas in the right upper lobe. Calcified lymph nodes in  the mediastinum and right hilum. Enlarged bilateral axillary lymph  nodes. A left axillary node measures 3.3 x 3.0 cm. The heart size is  normal. There is a right chest wall port.    Abdomen: There is a large amount of ascites. Evaluation of the solid  abdominal organs is limited by the lack of intravenous contrast. The  liver texture is heterogeneous. Liver attenuation is low suggesting  fatty infiltration. The gallbladder is contracted. No calcified  gallstones. The spleen, pancreas, adrenal glands and kidneys are  normal in appearance. There are multiple borderline and mildly  enlarged mesenteric lymph nodes.    Pelvis: There is no bowel obstruction. No free intraperitoneal gas.  Degenerative disease in the spine. Body wall edema.      Impression    IMPRESSION:  1. Large left pleural effusion and small right pleural effusion.  2. Large amount of ascites.  3. Enlarged axillary lymph nodes and mesenteric lymph nodes.  4. Probable fatty infiltration of the liver.    JUSTEN BOYD MD     Labs (personally reviewed/interpreted):  See a/p.  CT chest (personally reviewed/interpreted):  B/l effusions, L>R, otherwise  relatively clear.  Ct abd/pelv (personally reviewed/interpreted):  No acute abdominal process.  Hypodense liver noted--?tumor infiltration?    Billing: This patient is critically ill: Yes. Total critical care time today 45 min exclusive of procedures and in addition to and independently of 65 min already spent by Dr. Oviedo today.  Total time now 110 min.

## 2019-09-20 NOTE — PROVIDER NOTIFICATION
Md notified of critical lactic acid of 4.1, down from previous at OSH. No new orders received. Katharine Samuels RN on 9/19/2019 at 11:55 PM

## 2019-09-20 NOTE — PROGRESS NOTES
Pt placed on Bipap10/5/R14/40%. Tolerating well. Gel pad and mepilex applied. RT will continue to follow.    Carlee Arguello, RT

## 2019-09-20 NOTE — PROVIDER NOTIFICATION
Dr. Han notified of critical bicarb and lactic. No new orders at this time, continue to monitor.    Addendum 5:48 PM: Critical hgb of 6.1, MD notified. Will watch for orders if wanted. Monitor.    Addendum 6:27 PM: Repeat hgb 6.2. Low urine output. PRBCs ordered.

## 2019-09-20 NOTE — PLAN OF CARE
Patient arrived to [facility] AOX4, SBP in the 80s, Vasopressors started, added two more and titrated appropriately through the night. SBP 100s this am. Blood cultures drawn, lactic acid trending down. Patient denies pain, had one emesis and zofran was given. Patient Straight cathed for no urine out put and a bladder scan of 400. Labs drawn and treated appropriately. Continuing to monitor.

## 2019-09-20 NOTE — CONSULTS
[facility] Consultation by Gyn Oncology    Graciela Adrian MRN# [id number]   Age: 49 year old YOB: 1970     Date of Admission:  9/19/2019    Reason for consult: I was asked by Dr. Nixon to see Graciela Adrian in consultation.  She is a patient followed by myself and her local medical oncologist Dr. Hall who has been treating her with chemotherapy.  She was admitted with presumed septic shock without identifiable source.       Requesting physician: Dr. Nixon       Level of consult: Consult and follow for daily recommendations           Assessment and Plan:   1.  49 y.o. 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  I agree with Dr. Nixon to perform a paracentesis since SBP or line sepsis are most likely culprits as source for sepsis.  If SBP found on diagnostic paracentesis, would consider having IR place drain.  Appreciate care of ICU service.  Continue pressors and broad spectrum antibiotics for now.  I have discussed with Graciela that her cancer has progressed and prognosis is poor since Clear cell carcinoma is largely chemoresistant.  Given her functional status it is unlikely she will tolerate further chemotherapy.  She still wishes aggressive treatment of her current infection.  She is currently at [facility] from recent chemotherapy accounting for thrombocytopenia.  Will follow with CBC.  2.  Hyponatremia:  SIADH due to underlying tumor  3.  H/o PE:  Restart lovenox once platelet count better.    4.  Transaminitis    Total consult time:  50 min  Face to face time:  25 min       Chief Complaint:      HPI: 49 y.o.  10/1/18 Seen by Dr. Howard for an annual exam. C/o light menses, abdominal enlargement and early satiety   10/9/18 PUS: Slightly bulky 10 cm with several small fibroids. Endometrial stripe 4 mm. Right ovary enlarged measuring 9.3 x 8.9 x 7.3 cm with solid area measuring 8.4 x 6.4 x 6.4 cm and  a cystic component measuring 4 cm. Left ovary normal measuring 3.1 x 2.9 x 2.3 cm with several small follicles.   10/16/18 CT A/P: Bilateral complex pelvic masses with solid and cystic components (right 9.4 x 7.1 x 7.3 cm, left 3.1 x 3.8 x 3.3 cm). Extensive soft tissue thickening along the omentum and fine omental nodularity. Small 6 mm ovoid intermediate density to low density lesion is identified along the posterolateral margin of the spleen, large volume pelvic and abdominal ascites.   10/23/18: Paracentesis, 6.4 L removed. Cytology positive for malignancy.   10/30/18: CT chest (done for completion staging) has incidental finding of bilateral PE with relatively large clot burden. No finding of matted static disease in the thorax. Benign right upper lobe nodule. Pt started on Lovenox.   10/30/18:  = 482.1 U/mL   11/1/18: C1 neoadjuvant Carboplatin/Taxol (in [place]).   11/9/18: Paracentesis: 4 L removed.   11/21/18: C2 Carboplatin/Taxol   12/11/18:  = 562.3 U/mL   12/12/18: C3 Carboplatin/Taxol   12/20/18: CT C/A/P: Small left pleural effusion with associated left lower lobe atelectasis, slightly increased. Pulmonary emboli appear to have significantly improved with a small amount of residual thrombus in the right lower lobe. Slightly decreased thickening of the omentum consistent with slightly improved metastatic disease. Bilateral adnexal ovarian masses, right greater than left. These are unchanged. Uterine fibroids, unchanged.   1/17/19 X-lap, TAHBSO, omentectomy, aspiration of ascites, tumor debulking, appendectomy.   Pathology: Bilateral ovarian clear cell carcinomas with metastatic spread to omentum, left pericolic peritoneum, appendix.   2/1/19:  = 156 U/mL   2/19/19: C4 Carboplatin/Taxol/Bevacizumab   3/12/19: C5 Carboplatin/Taxol/Bevacizumab   3/20/19: Genetic testing done: No actionable mutations. BRCA 1/2 NEG   4/4/19: C6 Carboplatin/Taxol/Bevacizumab   4/9/19: CT C/A/P: Moderate  left pleural effusion, increased in size. Left lower lobe and lingular atelectasis. Resolution of pulmonary emboli. Moderate ascites, decreased. Omentum no longer shows abnormal thickening or soft tissue attenuation. Small mesenteric lymph nodes, unchanged. IVC filter in place   4/15/19:  = 468.7 U/mL   4/30/19: C1 Doxil/Avastin  = 350 U/mL   5/28/19: C2 Doxil/Avastin  = 150 U/mL   6/26/19: C3 Doxil/Avastin (PPE developed after this cycle)   7/22/19: CT C/A/P: New right-sided pulmonary emboli since comparison exam. Flattening of interventricular septum suggests elevated right-sided cardiac pressures. Interval increase in left side pleural effusion. Left lung is largely collapsed. Rightward shift of the mediastinum. Interval increase in the volume of bilateral axillary and mediastinal lymph nodes. Suggest progression of metastatic disease. Interval increase in volume of hypodense ascites in the abdomen and pelvis. Adenopathy along the mesentery is also increased.   7/22/19 - 7/25/19: Hospitalization at [facility] for pleural effusion. Thoracentesis done on 7/23/19 with 1,950mL removed. Paracentesis done same day, with 2,150mL removed. ECHO: 55-60% EF.   7/24/19:  = 84 U/mL   7/26/19: Low PD-L1 expression, MSI stable  8/13/19 See by me in clinic.  Discussed Rodas consult and Gemzar or weekly taxol as options for 3rd line chemotherapy  8/29/19 C1D1 Gemzar chemotherapy in [place] with Dr. Hall.  D8 held due to poor performance status  8/30/19 Paracentesis  9/4/19 Stool for C. Diff negative  9/12/18 C2D1 Gemzar with 25% dose reduction.  Dr. Hall discussed ACP and poor prognosis  9/13/19 Paracentesis for 6 Liters of fluid  Over last week since chemotherapy has had progressive worsening fatigue with some SOB and nausea.  Seen in ER in [place] yesterday and found to have BPs of 70.  Given IVF and placed on Levophed.  WBC 10, platelets 22 , Na 122, potassium 6.1, lactate 6.4 and procalcitonin 8.72.   Transferred to ICU at [facility].  CT C/A/P:  Very large left pleural effusion, small right pleural effusion.  Enlarged bilateral axillary LN.  Large amount of ascites.  Liver attenuation is low.  Multiple borderline and mildly enlarged mesenteric LN.  Currently on 3 pressors          SOCIAL Hx:  x 10 years. Lives with  in [place], [place]. Never smoker. 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  PE          Past Surgical History:   IVC filter placement and subsequent removal  1/17/19 X-lap, TAHBSO, omentectomy, aspiration of ascites, tumor debulking, appendectomy            Family History:   Great aunt with breast cancer  Aunt with uterine cancer  Uncle with prostate cancer  Mother with hypercholesterolemia  Father with hypercholesterolemia, HTN, arrhythmia requiring ablation               Allergies:   NKDA          Medications:     Current Facility-Administered Medications   Medication     azithromycin (ZITHROMAX) 500 mg vial to attach to  mL bag     dextrose 50 % injection 25 mL     heparin lock flush 10 UNIT/ML injection 2-5 mL     hydrocortisone sodium succinate PF (solu-CORTEF) injection 100 mg     lactated ringers infusion     lidocaine (LMX4) cream     lidocaine 1 % 0.1-1 mL     lidocaine 1 % 0.1-1 mL     naloxone (NARCAN) injection 0.1-0.4 mg     norepinephrine (LEVOPHED) 16 mg in  mL infusion     ondansetron (ZOFRAN-ODT) ODT tab 4 mg    Or     ondansetron (ZOFRAN) injection 4 mg     oxyCODONE (ROXICODONE) tablet 5 mg     pantoprazole (PROTONIX) 40 mg IV push injection     phenylephrine (TERRI-SYNEPHRINE) 50 mg in NaCl 0.9 % 250 mL infusion     piperacillin-tazobactam (ZOSYN) 4.5 g vial to attach to  mL bag     sodium chloride (PF) 0.9% PF flush 10 mL     sodium chloride (PF) 0.9% PF flush 10-20 mL     sodium chloride (PF) 0.9% PF  flush 5-50 mL     vancomycin (VANCOCIN) 1000 mg in dextrose 5% 200 mL PREMIX     vasopressin (VASOSTRICT) 40 Units in D5W 40 mL infusion             Review of Systems:   The Review of Systems is negative other than noted in the HPI          Physical Exam:   Vitals were reviewed  Temp: 97.8  F (36.6  C) Temp src: Oral BP: 100/45 Pulse: 89 Heart Rate: 81 Resp: 23 SpO2: 100 %      GENERAL:  Chronically ill appearing  female lying in bed  LUNGS:  Clear anteriorly  COR:  RRR  ABDOMEN  Massively distended with fluid wave  EXTREMITIES:  2+ pitting edema bilaterally                  Data:   All laboratory data reviewed    All imaging studies reviewed by me.    Attestation:  I have reviewed today's vital signs, notes, medications, labs and imaging.    Dina Lo MD

## 2019-09-20 NOTE — H&P
[facility] Intensive Care Unit  History and Physical  September 19, 2019    Graciela Adrian   MRN# [id number]   Age: 49 year old YOB: 1970     Date of Admission: 9/19/2019    Primary care provider: Dina Lo          Assessment and plan :   Graciela Adrian is a 49 year old female admitted on 9/19/2019 for septic shock of unclear source. Potential sources include urine, ascites or pleural space. She remains on multiple pressors with minimal urine output but improving lactate. Given her advanced metastatic ovarian/fallopian tube cancer her prognosis is guarded.    I have personally reviewed the daily labs, imaging studies, cultures and discussed the case with referring physician and consulting physicians.     My assessment and plan for this patient is as follows:    Neurology:   No active issues    Pain management as needed-currently without pain    Cardiovascular:   #Septic shock  In setting of immunosuppression and recent chemo. Presented with elevated lactate and is now hypotensive requiring escalating doses of pressors.  She does have a history of pulmonary embolism and had actively been treated with Lovenox but I do not think her shock is obstructive in nature nor do I see a large pericardial effusion on bedside echo.       Plan:  -Pressors as needed to maintain map > 65, currently on Levophed and Vasopressin   -Stress dose steroids initiated, Solu-Cortef 100 mg IV q8h  -CT chest/abdomen this evening  -Diagnostic paracentesis  -Trend lactate overnight  -Monitor urine output  -Antibiotics as below  -Follow-up urine and blood culture  -Can likely be enrolled in vitamin C trial in the morning    Pulmonary/Ventilator Management:   #Hx of PE  #Large left pleural effusion  Do not think this is obstructive shock but I am unable to adequately view the right ventricle on bedside echo.  Had been treated with Lovenox but currently on hold.  CT of her chest shows a large left pleural  effusion with associated atelectasis.  Despite those findings, she is not in respiratory distress with a very low oxygen requirement. Given her profound thrombocytopenia and relative stable respiratory status, I am hesitant to do either a thoracentesis or chest tube.     Plan:  -Holding Lovenox given thrombocytopenia  -Consider formal echo in a.m.  -Consider thoracentesis or chest tube placement if thrombocytopenia is improving    GI/Nutrition:  #Transaminitis   #Chronic ascites-? SBP  Has been getting paracentesis about once per month and was last on 1 week ago.  She does not feel like her belly is full but bedside ultrasound does show a fair amount of fluid.  SBP is possible although she complains of no abdominal pain.  Her transaminitis is likely related to shock liver as her AST increased significantly upon transfer.     Plan:  -Diagnostic paracentesis  -Trend LFTs    Renal/fluids/electrolytes:   #MONICA   #Hyperkalemia   #Hyponatremia   Baseline creatinine 0.7-0.8 presented with a creatinine of 1.24 at [place] ED. Upon arrival here creatinine is trended down slightly to 1.13.  Received 2 L normal saline prior to arrival.  Initial K was 6 and was treated with insulin, dextrose, albuterol and calcium.  Repeat level was 5.0 upon arrival.  Hyponatremia is improving likely in the setting of hypovolemia.    Plan:  -Additional liter of lactated Ringer upon arrival  -Trend electrolytes every 6 hours    Infectious Disease:   #Septic shock   Shock is likely septic in nature.  Lactate has been trending down but source is yet to be identified.  UA at outside hospital was positive for nitrites with trace leukocyte esterase.  Blood cultures and urine culture are pending.  Has yet to have a fever and looks surprisingly well given her hypotension.    Plan:  -Continue with vancomycin, Zosyn and azithromycin  -Stress dose steroids as above  -Consideration for antifungal in the morning if shock does not improve  -Trend  lactate  -Follow-up cultures  -Check cortisol level    Endocrine:   #Hypoglycemia  #Potential adrenal insufficiency   Blood sugar was 32 upon arrival likely in the setting of septic shock.  Improved with amps of D50.  Remains profoundly hypotensive despite improving lactate.  Question whether she has an element of adrenal insufficiency.  There is no answer involvement of her adrenals per the CT report.    Plan:  -Monitor blood sugars closely  -D50 as needed  -Check cortisol level    Hematology/Oncology:   Stage IIIc ovarian/fallopian tube cancer  Thrombocytopenia   Anemia   Leukocytosis    Coagulopathy   She is currently receiving palliative chemo and there is been some discussion with her oncologist about pursuing comfort cares.  Her her recent scans have shown progression of her disease despite therapy.  She is planning to get a second opinion at [place] for the first week of October.  She remains profoundly thrombocytopenic likely in the setting of septic shock.  Her anemia is similar to previous values.  She has a mild leukocytosis in the setting of septic shock.  Elevated PT and INR likely again in the setting of septic shock.  No active bleeding.     Plan:  -Trend CBC  -Follow coags carefully, no indication for FFP at this time    IV/Access:   Port   Right radial arterial line    Plan:  -Lines needed for critical illness.     Prophylaxis:   DVT Prophylaxis: Pneumatic Compression Devices, Lovenox on hold given thrombocytopenia   GI Prophylaxis: Not indicated    Restraints: Restraints for medical healing needed: NO  Family update by me today: Yes ,  Saleem at bedside and updated frequently overnight     Lines: No erythema or discharge at entry site for Port  Current lines are required for patient management    Medications     norepinephrine 0.5 mcg/kg/min (09/20/19 0007)     phenylephrine 0.5 mcg/kg/min (09/20/19 0211)     vasopressin (PITRESSIN) infusion ADULT (40 mL) 2.4 Units/hr (09/20/19 0120)        azithromycin  500 mg Intravenous Q24H     dextrose  25 mL Intravenous Once     hydrocortisone sodium succinate PF  100 mg Intravenous Q8H     piperacillin-tazobactam  4.5 g Intravenous Q6H     vancomycin (VANCOCIN) IV  1,000 mg Intravenous Q8H       Data        Goals for the next 24 hours :      -Wean pressors as able to maintain MAP >65  -Consider enrollment in Vitamin C trial   -Trend lactate, coags and platelets             Time Spent on this Encounter   Billing:  I spent 65 minutes (excluding procedure time) bedside and on the inpatient unit today managing the critical care of Graciela Adrian in relation to the issues listed in this note.    Chivo Oviedo MD  Pulmonary & Critical Care Medicine  [facility]   Pager: [phone]         Chief Complaint/ Reason for ICU Admission and HPI     Admitted for : Septic shock    History obtained from chart review and patient  .  History of Present Illness: 49-year-old female with history of ovarian/fallopian tube cancer currently undergoing palliative chemotherapy (Gemzar, last treatment 1 week ago) presents as a transfer from [place] ED for worsening fatigue and shortness of breath.  History is obtained from both chart review while at [place] as well as from patient and  who is at the bedside.    Patient notes roughly 1 week of worsening fatigue with some associated shortness of breath and nausea.  Her last chemotherapy treatment was a week ago and prior to this admission and tolerated it well.  She previously had no issues with chemotherapy treatments.  She denies fevers chills or night sweats.  Has reported a cough that is been present since her second PE diagnosis in January.  She notes no difference in the cough over the past week.  Denies abdominal pain and does not think that her ascites is building up.  Her last paracentesis was 9/13 and 6 L removed.  She has had a recurrent left-sided pleural effusion for which she had a  thoracentesis done while hospitalized at [facility] in July.    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  She is currently on third line therapy that was started on August 29.  Her most recent scans have shown progression of her disease and there was discussion about potentially moving towards comfort care.  However, patient plans to have a second opinion at [facility] the first week of October.    In the ED at [place], she was found to have a systolic blood pressure of 70 was initially started on normal saline infusion.  Subsequent blood pressure improved to the systolic 130s but eventually reverted back to 70s and was started on levophed through her port.  Initial labs showed a white count of 10, platelets of 22 (last week platelets were 300), sodium of 122, potassium 6.1, lactate of 6.4 and a procalcitonin of 8.72.  Decision to transfer to [facility] ICU as she was just admitted here in July.     On arrival to the ED she received 2 L normal saline and blood pressure check via cough showed a systolic of 80s/50s.  Bedside ultrasound showed large amount of ascitic fluid and bilateral pleural effusions.  Pleural effusions did not appear to be complex.    Repeat labs on arrival to the ICU, showed a lactate of 4.1 and platelets of 21.  The remainder of her labs are still pending.         Past Medical History:     Past Medical History:   Diagnosis Date     Anxiety      Carcinomatosis (H)      Obesity (BMI 30-39.9)              Past Surgical History:     Past Surgical History:   Procedure Laterality Date     HYSTERECTOMY TOTAL ABD, DULCE SALPINGO-OOPHORECTOMY, NODE DISSECTION, TUMOR DEBULKING, COMBINED N/A 1/17/2019    Procedure: HYSTERECTOMY TOTAL ABDOMINAL, BILATERAL SALPINGO OOPHORECTOMY, OMENTECTOMY TUMOR DEBULKING, APPENDECTOMY;  Surgeon:  Dina Lo MD;  Location:  OR     IR IVC FILTER PLACEMENT  1/17/2019     IR IVC FILTER REMOVAL  4/22/2019     LAPAROTOMY EXPLORATORY N/A 1/17/2019    Procedure: LAPAROTOMY EXPLORATORY;  Surgeon: Dina Lo MD;  Location:  OR     REMOVE FILTER INFERIOR VENA CAVA N/A 4/22/2019    Procedure: REMOVE FILTER INFERIOR VENA CAVA;  Surgeon: Stiven Aguirre MD;  Location:  OR            Social History:     Social History     Socioeconomic History     Marital status:      Spouse name: Not on file     Number of children: Not on file     Years of education: Not on file     Highest education level: Not on file   Occupational History     Not on file   Social Needs     Financial resource strain: Not on file     Food insecurity:     Worry: Not on file     Inability: Not on file     Transportation needs:     Medical: Not on file     Non-medical: Not on file   Tobacco Use     Smoking status: Not on file   Substance and Sexual Activity     Alcohol use: Not on file     Drug use: Not on file     Sexual activity: Not on file   Lifestyle     Physical activity:     Days per week: Not on file     Minutes per session: Not on file     Stress: Not on file   Relationships     Social connections:     Talks on phone: Not on file     Gets together: Not on file     Attends Zoroastrian service: Not on file     Active member of club or organization: Not on file     Attends meetings of clubs or organizations: Not on file     Relationship status: Not on file     Intimate partner violence:     Fear of current or ex partner: Not on file     Emotionally abused: Not on file     Physically abused: Not on file     Forced sexual activity: Not on file   Other Topics Concern     Not on file   Social History Narrative     Not on file     Smoking: No.   History   Smoking Status     Not on file   Smokeless Tobacco     Not on file     Alcohol: No  Social History    Substance and Sexual Activity      Alcohol use: Not on  file    Drug:  No   History   Drug Use Not on file            Family History:   No family history on file.         Allergies:   Please see allergy list which was reviewed this admission.  All allergies reviewed and addressed         Medications:     Current Facility-Administered Medications   Medication     azithromycin (ZITHROMAX) 500 mg vial to attach to  mL bag     dextrose 50 % injection 25 mL     hydrocortisone sodium succinate PF (solu-CORTEF) injection 100 mg     naloxone (NARCAN) injection 0.1-0.4 mg     norepinephrine (LEVOPHED) 16 mg in  mL infusion     ondansetron (ZOFRAN-ODT) ODT tab 4 mg    Or     ondansetron (ZOFRAN) injection 4 mg     phenylephrine (TERRI-SYNEPHRINE) 50 mg in NaCl 0.9 % 250 mL infusion     piperacillin-tazobactam (ZOSYN) 4.5 g vial to attach to  mL bag     vancomycin (VANCOCIN) 1000 mg in dextrose 5% 200 mL PREMIX     vasopressin (VASOSTRICT) 40 Units in D5W 40 mL infusion            Review of Systems:   The Review of Systems is negative other than noted in the HPI         Physical Exam:   Vitals were reviewed  Physical Exam   Temp: 98  F (36.7  C) Temp src: Oral Temp  Min: 98  F (36.7  C)  Max: 98  F (36.7  C) BP: (!) 43/29 Pulse: 101 Heart Rate: 103 Resp: (!) 33 SpO2: 100 %        There were no vitals filed for this visit.  No intake or output data in the 24 hours ending 09/19/19 7273    General:   Chronically ill, in no apparent distress   Neurologic:   Alert and oriented x 3  Eyes: no deviation present  Moves all extremities spontaneously, symmetrically and appropriately   HEENT:   Head is atraumatic  No neck mass or lymphadenopathy  Trachea is midline      Lungs:   Diminished breath sounds on the left. No wheeze appreciated   Cardiovascular:   Normal neck veins  Regular rate and rhythm  Normal S1,S2, and no gallop, rub or murmur   Abdomen:   Distended:  Yes .   Bowel sound present and normal: Yes .   Soft: No, tense. Tender: No.   Rebound:tendeness or guarding  present No   Extremities:   Clubbing present: No,   Edema present: No,   Acrocyanosis present: No,   Mottling present: No,   Normal capillary refill: Yes    Skin:   Warm, dry.  No rash on limited exam.    Lines/Drain:    Central line present: Yes Port  Arterial line present: Yes   External ventricular Drain present: No  Chest tube present: No  ZOHREH present: No  PEG present: No           Ancillary Data:   All laboratory and imaging data reviewed.   ABG/vent data:   Resp: (!) 33    No lab results found in last 7 days.     ROUTINE IP LABS  Recent Labs   Lab 09/19/19  2300   WBC 11.9*   HGB 8.7*   MCV 87   PLT 21*   INR 2.44*   *   POTASSIUM 5.0   CHLORIDE 99   CO2 18*   BUN 45*   CR 1.13*   ANIONGAP 9   AWLDEMAR 6.0*   *   ALBUMIN 0.9*   PROTTOTAL 3.4*   BILITOTAL 1.2   ALKPHOS 451*   *   *     Recent Results (from the past 24 hour(s))   CT Chest Abdomen Pelvis w/o Contrast    Narrative    CT CHEST ABDOMEN PELVIS W/O CONTRAST  9/20/2019 1:08 AM    HISTORY:  Septic shock; assess ascites and pleural effusion.    TECHNIQUE: CT scan obtained of the chest, abdomen, and pelvis without  IV contrast. Radiation dose for this scan was reduced using automated  exposure control, adjustment of the mA and/or kV according to patient  size, or iterative reconstruction technique.    COMPARISON:  None.    FINDINGS:    Chest: There is a very large left pleural effusion. Small right  pleural effusion. There is associated atelectasis bilaterally.  Calcified granulomas in the right upper lobe. Calcified lymph nodes in  the mediastinum and right hilum. Enlarged bilateral axillary lymph  nodes. A left axillary node measures 3.3 x 3.0 cm. The heart size is  normal. There is a right chest wall port.    Abdomen: There is a large amount of ascites. Evaluation of the solid  abdominal organs is limited by the lack of intravenous contrast. The  liver texture is heterogeneous. Liver attenuation is low suggesting  fatty  infiltration. The gallbladder is contracted. No calcified  gallstones. The spleen, pancreas, adrenal glands and kidneys are  normal in appearance. There are multiple borderline and mildly  enlarged mesenteric lymph nodes.    Pelvis: There is no bowel obstruction. No free intraperitoneal gas.  Degenerative disease in the spine. Body wall edema.      Impression    IMPRESSION:  1. Large left pleural effusion and small right pleural effusion.  2. Large amount of ascites.  3. Enlarged axillary lymph nodes and mesenteric lymph nodes.  4. Probable fatty infiltration of the liver.

## 2019-09-21 NOTE — PLAN OF CARE
A&O. Spent most of the day on Bipap. Remains on vaso and angiotensin II, Levo weaned off. Platelets continue to decrease, transfusion ordered. Family at bedside and updated on plan of care. Continue to monitor.

## 2019-09-21 NOTE — PHARMACY-VANCOMYCIN DOSING SERVICE
Pharmacy Vancomycin Note  Date of Service 2019  Patient's  1970   49 year old, female    Indication: Sepsis  Goal Trough Level: 15-20 mg/L  Day of Therapy: 2  Current Vancomycin regimen:  1000 mg IV q8h    Current estimated CrCl = Estimated Creatinine Clearance: 55.3 mL/min (A) (based on SCr of 1.17 mg/dL (H)).    Creatinine for last 3 days  2019: 11:00 PM Creatinine 1.13 mg/dL  2019:  4:30 AM Creatinine 1.10 mg/dL;  1:40 PM Creatinine 1.10 mg/dL;  5:15 PM Creatinine 1.17 mg/dL    Recent Vancomycin Levels (past 3 days)  2019: 10:50 PM Vancomycin Level 41.5 mg/L    Vancomycin IV Administrations (past 72 hours)                   vancomycin (VANCOCIN) 1000 mg in dextrose 5% 200 mL PREMIX (mg) 1,000 mg New Bag 19 1631     1,000 mg New Bag  0858     1,000 mg New Bag 19 2358                Nephrotoxins and other renal medications (From now, onward)    Start     Dose/Rate Route Frequency Ordered Stop    19 2336  vancomycin place baez - receiving intermittent dosing      1 each Intravenous SEE ADMIN INSTRUCTIONS 19 2336      19 0115  vasopressin (VASOSTRICT) 40 Units in D5W 40 mL infusion      2.4 Units/hr  2.4 mL/hr  Intravenous CONTINUOUS 19 0108      19 0100  piperacillin-tazobactam (ZOSYN) 4.5 g vial to attach to  mL bag      4.5 g  over 30 Minutes Intravenous EVERY 6 HOURS 19 2307      19 2230  norepinephrine (LEVOPHED) 16 mg in  mL infusion      0.03-0.4 mcg/kg/min × 72.7 kg  2-27.3 mL/hr  Intravenous CONTINUOUS 19 2228               Contrast Orders - past 72 hours (72h ago, onward)    None          Interpretation of levels and current regimen:  Trough level is  Supratherapeutic    Has serum creatinine changed > 50% in last 72 hours: No    Urine output:  diminished urine output    Renal Function:     Plan:  1.  Hold doses for now  2.  Pharmacy will check trough levels as appropriate in 1-3 Days.    3. Serum  creatinine levels will be ordered daily for the first week of therapy and at least twice weekly for subsequent weeks.      Jose Merchant RPH        .

## 2019-09-21 NOTE — PROGRESS NOTES
[facility]  Hospitalist Progress Note          Assessment and Plan:     Septic shock (H):  Remains on pressors.  Epinephrine Changed to Angiotensin II with improved lactic acid levels.  No source yet identified.  Graciela reports feeling better today and rested well on BiPap overnight.  Remains on broad spectrum antibiotics.  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  Received blood last night.  Will continue to watch counts.  Would transfuse platelets if < 10K, active bleeding or tap needed.    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  It is doubtful that she will tolerate further treatment with chemotherapy.  Current goal is to have her recover from recent insult if possible.  Will see if her functional status improves.                    Interval History:   Feeling better today.  Reports resting well last night              Medications:   I have reviewed this patient's current medications               Physical Exam:   Blood pressure 100/45, pulse 89, temperature 97.7  F (36.5  C), temperature source Axillary, resp. rate 27, weight 78.1 kg (172 lb 2.9 oz), SpO2 100 %.        Vital Sign Ranges  Temperature Temp  Av.4  F (36.3  C)  Min: 96.6  F (35.9  C)  Max: 98  F (36.7  C)   Blood pressure No data recorded.       No data recorded.      Pulse No data recorded   Respirations Resp  Av.5  Min: 13  Max: 59   Pulse oximetry SpO2  Av.1 %  Min: 90 %  Max: 100 %         Intake/Output Summary (Last 24 hours) at 2019 1133  Last data filed at 2019 1000  Gross per 24 hour   Intake 4622.1 ml   Output 529 ml   Net 4093.1 ml       Lungs:   Clear anteriorly     Cardiovascular:   normal apical pulses      Abdomen:   Massively distended with  ascites     Musculoskeletal:   2+ pitting edema bilaterally                Data:   All laboratory data reviewed

## 2019-09-21 NOTE — PROGRESS NOTES
Critical Care Progress Note      09/21/2019    Name: Graciela Adrian MRN#: [id number]   Age: 49 year old YOB: 1970     Hsptl Day# 2  ICU DAY #2    MV DAY #0             Problem List:   Active Problems:    Septic shock (H)  hyponatremia   Lactic acidosis  Elevated LFTs  thrombocytopenia         Summary/Hospital Course:     49F admitted 9/19 from OSH ED with septic shock. Unclear source. Started on vasopressors and BiPAP.        Assessment and plan :     Graciela Adrian IS a 49 year old female admitted on 9/19/2019 for shock, probably septic.   I have personally reviewed the daily labs, imaging studies, cultures and discussed the case with referring physician and consulting physicians.     My assessment and plan by system for this patient is as follows:    Neurology/Psychiatry:   1. Analgesia: prn oxycodone.      Cardiovascular:   1.   Shock:  Suspect septic.  Lactic acid improving after switching epinephrine to angiotensin II.  Will hold off on further fluids.  Recheck lactic acid at 2 PM today. Continue current antibiotics.     Pulmonary/Ventilator Management:   1. Hypoxemia:  Large pleural effusion.  Currently platelets of 14, so will hold off on thoracentesis. Was on BiPAP overnight.  Will give her a break from BiPAP and place on nasal cannula.  Keep sats > 92%.      GI and Nutrition :   1. Clears   2. Elevated LFTs:  Suspect shock liver vs infiltration of liver by malignancy.  Monitor.    Renal/Fluids/Electrolytes:   1. Johan:  Creat elevated to 1.10.  In setting of shock.  Monitor uop and creatinine.  2. HypoNa:  SIADH vs decrease effective arterial circulating volume.    3. Low urine output - continue to monitor.      Plan to keep fluid balance no more positive than 2 L.     Infectious Disease:   1. Septic Shock: blood and ascites culture negative.  No sign of SBP. Continue empiric vanco/zosyn/azithro.     Endocrine:   1. Stress induced hyperglycemia: prn insulin    Hematology/Oncology:   1.  Pancytopenia - due to recent chemo. Monitor counts.   3.  Ovarian cancer:  Widely metastatic.  Suspect her liver may be significantly infiltrated with disruption of liver synthetic processes (see above).  Consulted [facility]--pt of Dr. Lo as outpt.  4.  H/o PE on CT scan 7/22.  Holding AC for now in setting of thrombocytopenia.      IV/Access:   1. Venous access - port  2. Arterial access - a-line    ICU Prophylaxis:   1. DVT: mechanical  2. Feeding - clears today  3. Family Update: pt herself at bedside today  4. Disposition - ICU           Interim History:     Increased lactic acidosis yesterday. Vasopressors switched from epinephrine to angiotensin. Improvement of lactic acid.  Was on biPAP overnight. No other acute events.          Key Medications:       azithromycin  500 mg Intravenous Q24H     hydrocortisone sodium succinate PF  100 mg Intravenous Q8H     pantoprazole (PROTONIX) IV  40 mg Intravenous Daily with breakfast     piperacillin-tazobactam  4.5 g Intravenous Q6H     sodium chloride (PF)  10 mL Intracatheter Q8H     vancomycin place baez - receiving intermittent dosing  1 each Intravenous See Admin Instructions       angiotensin II (GIAPREZA) 2.5 mg in  mL (adult std) infusion 20 ng/kg/min (09/21/19 0745)     lactated ringers 100 mL/hr at 09/21/19 0745     norepinephrine 0.1 mcg/kg/min (09/21/19 0834)     phenylephrine Stopped (09/20/19 1350)     study - vitamin C vs. placebo (IDS #5174) infusion 25 mL/hr (09/21/19 0746)     vasopressin (PITRESSIN) infusion ADULT (40 mL) 2.4 Units/hr (09/21/19 0746)               Physical Examination:   Temp:  [96.6  F (35.9  C)-98  F (36.7  C)] 97.6  F (36.4  C)  Heart Rate:  [] 82  Resp:  [13-59] 27  MAP:  [63 mmHg-163 mmHg] 84 mmHg  Arterial Line BP: ()/() 105/70  FiO2 (%):  [40 %] 40 %  SpO2:  [90 %-100 %] 100 %    Intake/Output Summary (Last 24 hours) at 9/20/2019 0854  Last data filed at 9/20/2019 0600  Gross per 24 hour   Intake  2250 ml   Output 650 ml   Net 1600 ml     Wt Readings from Last 4 Encounters:   09/21/19 78.1 kg (172 lb 2.9 oz)   07/22/19 72.7 kg (160 lb 3.2 oz)   04/22/19 81.6 kg (180 lb)   01/20/19 82.6 kg (182 lb)     Arterial Line BP: ()/() 105/70  MAP:  [63 mmHg-163 mmHg] 84 mmHg  FiO2 (%): 40 %  Resp: 27    Recent Labs   Lab 09/21/19  0530 09/20/19  2354 09/20/19  1715 09/20/19  1340   PH 7.33* 7.26* 7.25* 7.04*   PCO2 27* 33* 21* 22*   PO2 125* 198* 189* 148*   HCO3 15* 15* 9* 6*   O2PER 40  --  40% 3L       GEN: NAD, pleasant  HEENT: anicteric  PULM: unlabored, left lung decreased breath sounds.   CV/COR: RRR S1S2 no gallop,  No rub, no murmur  ABD: soft nontender, mild distenion   EXT:  2 + LE edema.   NEURO: awake, alert, oriented, rapid/fluent/appropriate speech, normal str/sens x4.  SKIN: no obvious rash  LINES: clean, dry intact         Data:   All data and imaging reviewed     ROUTINE ICU LABS (Last four results)  CMP  Recent Labs   Lab 09/21/19  0530 09/20/19  2354 09/20/19  1715 09/20/19  1340 09/20/19  0430 09/19/19  2300   * 127* 126* 127* 126* 126*   POTASSIUM 4.8 4.9 4.9 6.4* 5.4* 5.0   CHLORIDE 102 101 100 102 100 99   CO2 15* 15* 11* 7* 15* 18*   ANIONGAP 10 11 15* 18* 11 9   * 115* 145* 113* 115* 117*   BUN 40* 42* 42* 44* 44* 45*   CR 1.08* 1.08* 1.17* 1.10* 1.10* 1.13*   GFRESTIMATED 60* 60* 54* 58* 58* 57*   GFRESTBLACK 69 69 63 68 68 66   WALDEMAR 6.4* 6.4* 6.4* 6.7* 6.1* 6.0*   MAG 2.9*  --   --   --   --  3.4*   PHOS 3.1  --   --   --   --   --    PROTTOTAL 3.9*  --  4.3*  --  3.8* 3.4*   ALBUMIN 2.1*  --  2.5*  --  0.9* 0.9*   BILITOTAL 4.6*  --  1.9*  --  1.3 1.2   ALKPHOS 331*  --  290*  --  503* 451*   *  --  472*  --  904* 782*   *  --  108*  --  168* 130*     CBC  Recent Labs   Lab 09/21/19  0530 09/20/19  2354 09/20/19  1756 09/20/19  1715 09/20/19  1340   WBC 2.3* 1.6*  --  8.9 11.9*   RBC 3.73* 3.46*  --  2.29* 3.29*   HGB 10.4* 9.6* 6.2* 6.1* 8.7*   HCT  32.6* 30.9*  --  20.5* 29.6*   MCV 87 89  --  90 90   MCH 27.9 27.7  --  26.6 26.4*   MCHC 31.9 31.1*  --  29.8* 29.4*   RDW 18.8* 19.9*  --  22.7* 23.2*   PLT 14* 18*  --  18* 24*     INR  Recent Labs   Lab 09/20/19  0430 09/19/19  2300   INR 2.25* 2.44*     Arterial Blood Gas  Recent Labs   Lab 09/21/19  0530 09/20/19  2354 09/20/19  1715 09/20/19  1340   PH 7.33* 7.26* 7.25* 7.04*   PCO2 27* 33* 21* 22*   PO2 125* 198* 189* 148*   HCO3 15* 15* 9* 6*   O2PER 40  --  40% 3L       All cultures:  Recent Labs   Lab 09/19/19  2342 09/19/19  2300   CULT No growth after 1 day No growth after 1 day     No results found for this or any previous visit (from the past 24 hour(s)).      Billing: This patient is critically ill: Yes, on vasopressors. Total critical care time today 35 min exclusive of procedures.     Robin Prather MD

## 2019-09-21 NOTE — PLAN OF CARE
A&O. Remains on levo and vaso. Zeyad stopped and angiotensin II started. PICC placed today. Line sites oozing. Labs abnormal. Hgb low, 2 units PRBCs ordered. Contreras placed, low output. Pt became became SOB and tachypneic, Bipap started. Family at bedside and updated on plan of care. Continue to monitor.

## 2019-09-21 NOTE — PROVIDER NOTIFICATION
Dr. Wang notified of critical lab. . Will assess, no new orders at this time. Continue to monitor.

## 2019-09-21 NOTE — PLAN OF CARE
Pt neurologically intact. A&Ox4. SR. Attempt to titrate pressors. Continue Levo, vaso, and angio II. NC at beginning of shift, BiPap 40% currently. Tolerating well. 2 units PRBC given, responded well Hgb 10.4. Labs continue to be labile. Minimal UOP, discussed with Dr. Wang, continue to monitor. No BM.

## 2019-09-22 NOTE — PROGRESS NOTES
CPAP/BiPAP Settings  IPAP: 10  EPAP: 5  Rate: 12  FiO2: 40  Timed Inspiration (sec): 9 hours    CPAP/BiPAP/AVAPS/AVAPS AE Alarms  High Pressure: 20  Low Pressure: 5  Low Pressure Delay: 20  High Rate (breaths/min): 45  Low Rate (breaths/min): 5  Alarm Volume Level: 10    CPAP/BiPAP/AVAPS/AVAPS AE Patient Assessment  Skin Assessment: Intact  Barriers Applied: mepilex and gel pad  Lung Sounds: dimn    Plan:  Continue bipap. Breaks as tolerated.    Carlee Arguello, RT

## 2019-09-22 NOTE — PROGRESS NOTES
Critical Care Progress Note      09/22/2019    Name: Graciela Adrian MRN#: [id number]   Age: 49 year old YOB: 1970     Hsptl Day# 3  ICU DAY #3    MV DAY #0             Problem List:   Active Problems:    Septic shock (H)  hyponatremia   Lactic acidosis  Elevated LFTs  thrombocytopenia         Summary/Hospital Course:     49F admitted 9/19 from OSH ED with septic shock. Unclear source. Started on vasopressors and BiPAP.        Assessment and plan :     Graciela Adrian IS a 49 year old female admitted on 9/19/2019 for shock, probably septic.   I have personally reviewed the daily labs, imaging studies, cultures.    My assessment and plan by system for this patient is as follows:    Neurology/Psychiatry:   1. Analgesia: prn oxycodone.      Cardiovascular:   1.   Shock:  Suspect septic. Improving.  Now on just angiotensin II.  Lactic acid improving after switching epinephrine to angiotensin II.  Recheck lactic acid. UOP is 10-15 ml/hr.  Will give 12.5 g of 5% albumin to see if UOP picks up.     Pulmonary/Ventilator Management:   1. Hypoxemia:  Large pleural effusion on the left.  Would likely benefit from thoracentesis. WBC is recovering, so hopefully platelets will follow soon.  Can perform thoracentesis at that time.  In the mean time, can use NIPPV as needed for shortness of breath.  Keep sats > 92%      GI and Nutrition :   1. Clears   2. Elevated LFTs:  Suspect shock liver vs infiltration of liver by malignancy.     Renal/Fluids/Electrolytes:   1. Johan:  Creat elevated to 1.10.  In setting of shock.  Monitor uop and creatinine.  2. HypoNa:  SIADH vs decrease effective arterial circulating volume.    3. Low urine output - giving 5% albumin today to see if UOP picks up.      Plan to keep fluid balance no more positive than 1 L     Infectious Disease:   1. Septic Shock: blood and ascites culture negative.  No sign of SBP. Continue empiric vanco/zosyn/azithro.     Endocrine:   1. Stress induced  hyperglycemia: prn insulin    Hematology/Oncology:   1. Pancytopenia - due to recent chemo. Monitor counts.   3.  Ovarian cancer:  Widely metastatic.  Suspect her liver may be significantly infiltrated with disruption of liver synthetic processes (see above).  Consulted [facility]--pt of Dr. Lo as outpt.  4.  H/o PE on CT scan 7/22.  Holding AC for now in setting of thrombocytopenia.      IV/Access:   1. Venous access - port  2. Arterial access - a-line    ICU Prophylaxis:   1. DVT: mechanical  2. Feeding - clears   3. Family Update: pt herself at bedside today  4. Disposition - ICU           Interim History:     Vasopressors have been able to be decreased. Now just on angiotensin II.  Patient feels about the same as yesterday, maybe a bit more short of breath.  Had some hematochezia last night.  Given platelet transfusion         Key Medications:       albumin human  12.5 g Intravenous Once     azithromycin  500 mg Intravenous Q24H     pantoprazole (PROTONIX) IV  40 mg Intravenous Daily with breakfast     piperacillin-tazobactam  4.5 g Intravenous Q6H     sodium chloride (PF)  10 mL Intracatheter Q8H     vancomycin place baez - receiving intermittent dosing  1 each Intravenous See Admin Instructions       angiotensin II (GIAPREZA) 2.5 mg in  mL (adult std) infusion 20 ng/kg/min (09/21/19 1927)     lactated ringers 20 mL/hr at 09/22/19 1150     norepinephrine Stopped (09/21/19 1027)     phenylephrine Stopped (09/20/19 1350)     study - vitamin C vs. placebo (IDS #5174) infusion 25 mL/hr (09/22/19 0517)     vasopressin (PITRESSIN) infusion ADULT (40 mL) Stopped (09/22/19 1158)               Physical Examination:   Temp:  [97.4  F (36.3  C)-97.6  F (36.4  C)] 97.6  F (36.4  C)  Heart Rate:  [75-93] 90  Resp:  [17-84] 25  MAP:  [8 mmHg-100 mmHg] 84 mmHg  Arterial Line BP: (8-120)/(8-93) 100/72  FiO2 (%):  [40 %] 40 %  SpO2:  [82 %-100 %] 100 %    Intake/Output Summary (Last 24 hours) at 9/20/2019 0854  Last  data filed at 9/20/2019 0600  Gross per 24 hour   Intake 2250 ml   Output 650 ml   Net 1600 ml     Wt Readings from Last 4 Encounters:   09/21/19 78.1 kg (172 lb 2.9 oz)   07/22/19 72.7 kg (160 lb 3.2 oz)   04/22/19 81.6 kg (180 lb)   01/20/19 82.6 kg (182 lb)     Arterial Line BP: (8-120)/(8-93) 100/72  MAP:  [8 mmHg-100 mmHg] 84 mmHg  FiO2 (%): 40 %  Resp: 25    Recent Labs   Lab 09/21/19  0530 09/20/19  2354 09/20/19  1715 09/20/19  1340   PH 7.33* 7.26* 7.25* 7.04*   PCO2 27* 33* 21* 22*   PO2 125* 198* 189* 148*   HCO3 15* 15* 9* 6*   O2PER 40  --  40% 3L       GEN: NAD, pleasant  HEENT: anicteric  PULM: unlabored, left lung decreased breath sounds. On NIPPV  CV/COR: RRR S1S2 no gallop,  No rub, no murmur  ABD: soft nontender, mild distenion   EXT:  2 + LE edema.   NEURO: awake, alert, oriented, fluent/appropriate speech, normal str/sens x4.  SKIN: no obvious rash  LINES: clean, dry intact         Data:   All data and imaging reviewed     ROUTINE ICU LABS (Last four results)  CMP  Recent Labs   Lab 09/22/19  0520 09/21/19  1350 09/21/19  0530 09/20/19  2354 09/20/19  1715  09/20/19  0430 09/19/19  2300   * 131* 127* 127* 126*   < > 126* 126*   POTASSIUM 4.2 4.7 4.8 4.9 4.9   < > 5.4* 5.0   CHLORIDE 104 105 102 101 100   < > 100 99   CO2 13* 16* 15* 15* 11*   < > 15* 18*   ANIONGAP 13 10 10 11 15*   < > 11 9   * 99 111* 115* 145*   < > 115* 117*   BUN 42* 40* 40* 42* 42*   < > 44* 45*   CR 0.94 0.95 1.08* 1.08* 1.17*   < > 1.10* 1.13*   GFRESTIMATED 71 70 60* 60* 54*   < > 58* 57*   GFRESTBLACK 82 81 69 69 63   < > 68 66   WALDEMAR 6.0* 6.4* 6.4* 6.4* 6.4*   < > 6.1* 6.0*   MAG  --   --  2.9*  --   --   --   --  3.4*   PHOS 2.9  --  3.1  --   --   --   --   --    PROTTOTAL  --   --  3.9*  --  4.3*  --  3.8* 3.4*   ALBUMIN  --   --  2.1*  --  2.5*  --  0.9* 0.9*   BILITOTAL  --   --  4.6*  --  1.9*  --  1.3 1.2   ALKPHOS  --   --  331*  --  290*  --  503* 451*   AST  --   --  585*  --  472*  --  904* 782*    ALT  --   --  130*  --  108*  --  168* 130*    < > = values in this interval not displayed.     CBC  Recent Labs   Lab 09/22/19  0930 09/22/19  0520 09/21/19  2300 09/21/19  1350 09/21/19  0530   WBC 5.5 4.6  --  2.4* 2.3*   RBC 3.65* 3.64*  --  3.60* 3.73*   HGB 10.4* 10.3*  --  10.3* 10.4*   HCT 32.1* 32.0*  --  31.3* 32.6*   MCV 88 88  --  87 87   MCH 28.5 28.3  --  28.6 27.9   MCHC 32.4 32.2  --  32.9 31.9   RDW 19.4* 19.4*  --  18.8* 18.8*   PLT 25* 28* 36* 10* 14*     INR  Recent Labs   Lab 09/22/19  0930 09/20/19  0430 09/19/19  2300   INR 2.58* 2.25* 2.44*     Arterial Blood Gas  Recent Labs   Lab 09/21/19  0530 09/20/19  2354 09/20/19  1715 09/20/19  1340   PH 7.33* 7.26* 7.25* 7.04*   PCO2 27* 33* 21* 22*   PO2 125* 198* 189* 148*   HCO3 15* 15* 9* 6*   O2PER 40  --  40% 3L       All cultures:  Recent Labs   Lab 09/20/19  1230 09/19/19  2342 09/19/19  2300   CULT Culture negative monitoring continues No growth after 2 days No growth after 2 days     Recent Results (from the past 24 hour(s))   XR Chest Port 1 View    Narrative    XR CHEST PORT 1 VW  9/22/2019 6:55 AM     HISTORY:  hypoxemia    COMPARISON: Film dated 7/24/2019    FINDINGS:  Left Port-A-Cath is in place. Left PICC line is also noted.  The tips of the catheters are near the junctions of the superior vena  cava and right atrium. Since the previous film, there has been an  increase in the left pleural effusion. There is now a large left  pleural effusion present. Indeterminant pulmonary nodule is seen in  the right midlung.      Impression    IMPRESSION: When compared to 7/24/2018, there is been an increase in  the left pleural effusion.    KELLI KHOURY MD         Billing: This patient is critically ill: Yes, on vasopressors. Total critical care time today 35 min exclusive of procedures.     Robin Prather MD

## 2019-09-22 NOTE — PROGRESS NOTES
[facility]  Hospitalist Progress Note          Assessment and Plan:     Septic shock (H):  Remains on single pressor.  No source yet identified.  Graciela reports feeling better today and rested well on BiPap overnight.  Remains on broad spectrum antibiotics.  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  Received blood Friday night and platelets today.  Will continue to watch counts.  Would transfuse platelets if < 10K, active bleeding or tap needed.  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  It is doubtful that she will tolerate further treatment with chemotherapy.  Current goal is to have her recover from recent insult if possible.  Will see if her functional status improves.                Interval History:   no new complaints              Medications:   I have reviewed this patient's current medications               Physical Exam:   Blood pressure 100/45, pulse 89, temperature 97.6  F (36.4  C), temperature source Axillary, resp. rate 25, weight 78.1 kg (172 lb 2.9 oz), SpO2 100 %.        Vital Sign Ranges  Temperature Temp  Av.5  F (36.4  C)  Min: 97.4  F (36.3  C)  Max: 97.6  F (36.4  C)   Blood pressure No data recorded.       No data recorded.      Pulse No data recorded   Respirations Resp  Av.4  Min: 17  Max: 84   Pulse oximetry SpO2  Av.4 %  Min: 82 %  Max: 100 %         Intake/Output Summary (Last 24 hours) at 2019 1234  Last data filed at 2019 1200  Gross per 24 hour   Intake 1064 ml   Output 425 ml   Net 639 ml       Lungs:   Clear anteriorly     Cardiovascular:   normal  apical pulses      Abdomen:   Distended, Fluid wave present     Musculoskeletal:   3+ pitting edema of LE                Data:   All laboratory data reviewed

## 2019-09-22 NOTE — PROGRESS NOTES
CPAP/BiPAP Settings  IPAP: 10  EPAP: 5  Rate: 12  FiO2: 40  Timed Inspiration (sec): 0.9    CPAP/BiPAP/AVAPS/AVAPS AE Alarms  High Pressure: 20  Low Pressure: 5  Low Pressure Delay: 20  High Rate (breaths/min): 45  Low Rate (breaths/min): 5  Alarm Volume Level: 10    CPAP/BiPAP/AVAPS/AVAPS AE Patient Assessment  Skin Assessment: Clean, dry, intact  Barriers Applied: Mepilex and gel pad  Lung Sounds: Fine crackles    Plan: Continue patient on BiPAP as needed. RT will continue to monitor.

## 2019-09-22 NOTE — PLAN OF CARE
Pt sleepy throughout day. Spent majority of the day on bipap. Weaned off vaso, angiotensin remains, BPs remain soft. Loose stools with blood x3, MD aware. Urine output decreased. Albumin given with no change in output. Family at bedside and updated on plan of care.  Patient was educated on the importance of frequent repositioning and risk for pressure injuries numerous times. Patient still refused repositioning multiple times. New small, nonblanchable, reddened area to right sacrum. Pt educated further on the importance frequent repositioning and preventing further injury. Pt agreed to reposition at that time (1600). Protective mepilex was in place throughout the day. WOC consult placed and area documented. Will continue to monitor.

## 2019-09-22 NOTE — PHARMACY-VANCOMYCIN DOSING SERVICE
Pharmacy Vancomycin Note  Date of Service 2019  Patient's  1970   49 year old, female    Indication: Sepsis  Goal Trough Level: 15-20 mg/L  Day of Therapy: 3  Current Vancomycin regimen:  Intermittent dosing    Current estimated CrCl = Estimated Creatinine Clearance: 71.7 mL/min (based on SCr of 0.95 mg/dL).    Creatinine for last 3 days  2019: 11:00 PM Creatinine 1.13 mg/dL  2019:  4:30 AM Creatinine 1.10 mg/dL;  1:40 PM Creatinine 1.10 mg/dL;  5:15 PM Creatinine 1.17 mg/dL; 11:54 PM Creatinine 1.08 mg/dL  2019:  5:30 AM Creatinine 1.08 mg/dL;  1:50 PM Creatinine 0.95 mg/dL    Recent Vancomycin Levels (past 3 days)  2019: 10:50 PM Vancomycin Level 41.5 mg/L  2019: 10:30 PM Vancomycin Level 22.3 mg/L    Vancomycin IV Administrations (past 72 hours)                   vancomycin (VANCOCIN) 1000 mg in dextrose 5% 200 mL PREMIX (mg) 1,000 mg New Bag 19 1631     1,000 mg New Bag  0858     1,000 mg New Bag 19 2358                Nephrotoxins and other renal medications (From now, onward)    Start     Dose/Rate Route Frequency Ordered Stop    19 2336  vancomycin place baez - receiving intermittent dosing      1 each Intravenous SEE ADMIN INSTRUCTIONS 19 2336      19 0115  vasopressin (VASOSTRICT) 40 Units in D5W 40 mL infusion      2.4 Units/hr  2.4 mL/hr  Intravenous CONTINUOUS 19 0108      19 0100  piperacillin-tazobactam (ZOSYN) 4.5 g vial to attach to  mL bag      4.5 g  over 30 Minutes Intravenous EVERY 6 HOURS 19 2307      19 223  norepinephrine (LEVOPHED) 16 mg in  mL infusion      0.03-0.4 mcg/kg/min × 72.7 kg  2-27.3 mL/hr  Intravenous CONTINUOUS 19 2228               Contrast Orders - past 72 hours (72h ago, onward)    None          Interpretation of levels and current regimen:  Trough level is  Supratherapeutic         Plan:  1.  Continue to hold doses while level is high  2.  Pharmacy will  check trough levels as appropriate in 1-3 Days.    3. Serum creatinine levels will be ordered daily for the first week of therapy and at least twice weekly for subsequent weeks.      Jose Merchant RPH        .

## 2019-09-22 NOTE — PHARMACY-VANCOMYCIN DOSING SERVICE
Pharmacy Vancomycin Note  Date of Service 2019  Patient's  1970   49 year old, female    Indication: Sepsis  Goal Trough Level: 15-20 mg/L  Day of Therapy: 4  Current Vancomycin regimen:  Intermittent dosing    Current estimated CrCl = Estimated Creatinine Clearance: 72.5 mL/min (based on SCr of 0.94 mg/dL).    Creatinine for last 3 days  2019: 11:00 PM Creatinine 1.13 mg/dL  2019:  4:30 AM Creatinine 1.10 mg/dL;  1:40 PM Creatinine 1.10 mg/dL;  5:15 PM Creatinine 1.17 mg/dL; 11:54 PM Creatinine 1.08 mg/dL  2019:  5:30 AM Creatinine 1.08 mg/dL;  1:50 PM Creatinine 0.95 mg/dL  2019:  5:20 AM Creatinine 0.94 mg/dL    Recent Vancomycin Levels (past 3 days)  2019: 10:50 PM Vancomycin Level 41.5 mg/L  2019: 10:30 PM Vancomycin Level 22.3 mg/L    Vancomycin IV Administrations (past 72 hours)                   vancomycin (VANCOCIN) 1000 mg in dextrose 5% 200 mL PREMIX (mg) 1,000 mg New Bag 19 1631     1,000 mg New Bag  0858     1,000 mg New Bag 19 2358                Nephrotoxins and other renal medications (From now, onward)    Start     Dose/Rate Route Frequency Ordered Stop    19 1415  vancomycin 1500 mg in 0.9% NaCl 250 ml intermittent infusion 1,500 mg      1,500 mg  over 90 Minutes Intravenous ONCE 19 1404      19 2336  vancomycin place baez - receiving intermittent dosing      1 each Intravenous SEE ADMIN INSTRUCTIONS 19 2336      19 0115  vasopressin (VASOSTRICT) 40 Units in D5W 40 mL infusion      2.4 Units/hr  2.4 mL/hr  Intravenous CONTINUOUS 19 0108      19 0100  piperacillin-tazobactam (ZOSYN) 4.5 g vial to attach to  mL bag      4.5 g  over 30 Minutes Intravenous EVERY 6 HOURS 19 2307      19 223  norepinephrine (LEVOPHED) 16 mg in  mL infusion      0.03-0.4 mcg/kg/min × 72.7 kg  2-27.3 mL/hr  Intravenous CONTINUOUS 19 9916               Contrast Orders - past 72 hours  (72h ago, onward)    None          Interpretation of levels and current regimen:  Trough level is  Therapeutic (was from last jose j)    Has serum creatinine changed > 50% in last 72 hours: No    Urine output:  unable to determine    Renal Function: Stable    Plan:  1.   1500 mg x 1 dose  2.  Pharmacy will check trough levels as appropriate in 1-3 Days.    3. Serum creatinine levels will be ordered daily for the first week of therapy and at least twice weekly for subsequent weeks.      Christopher Young [facility]        .

## 2019-09-23 NOTE — PROGRESS NOTES
Intensivist addendum:  Noon labs show metabolic acidosis 7.16/35/38/12 with a venous oxyhemoglobin of 57.  Lactate has risen sharply to 6.0.  Ical remains low at 3.7.  Ammonia level 41.  In light of these results I pushed two amps of bicarb and started a bicarb drip.  I also pushed an amp of calcium and ordered aggressive repletion with calcium gluconate.  We have also re-initiated levophed for worsening pressor needs despite this.  I ordered a stat echo to re-examine her ventricular function in light of this.  This showed persistent normal LV function, difficult assessment of RV function, and no pericardial effusion.      The patient was unable to make her own medical decisions due to encephalopathy noted earlier in the day, and input was needed to guide medical care.  I held a family meeting with the patient's , Saleem, and her parents.  I explained to them her worsening condition, and the fact that her condition hasn't improved despite antibiotic treatment.  I also explained my growing concern that her worsening condition is likely due to something untreatable such as her cancer.  They were accepting of this, and we discussed whether she would want further escalation of aggressive cares includin.  Intubation, 2.  Renal dialysis, 3.  Chest compressions or ACLS in event of cardiac arrest, and 4.  Thoracentesis of L-sided pleural effusion.  In all cases her family felt she would not want these escalations.  They understand she is very near the end of her life.  The plan for now is to continue management of her shock and respiratory failure using the measures we are now while other family members have a chance to visit.  Possible transition to comfort measures within the next 24 hours.  All questions asked and answered.   services offered.    I spent 40 additional minutes of critical care time (in addition to 45 min earlier today, total now 85 min) managin.  Shock of unknown etiology, and 2.   Acute hypoxemic respiratory failure, bipap-dependent.

## 2019-09-23 NOTE — CONSULTS
"CLINICAL NUTRITION SERVICES  -  ASSESSMENT NOTE      RECOMMENDATIONS FOR MD/PROVIDER TO ORDER:   Consider naso-enteric TF placement (pending goals of care discussion) as minimal PO x4 days this admission with poor mentation 2/2 decreased PO and weight loss PTA meeting criteria for malnutrition    Future/Additional Recommendations:   - If TF ordered, d/t higher pressor needs and at risk for refeeding, recommend TF Isosource 1.5 at 15 mL/hr and hold at this rate with slow advancements planned. Recommend eventual TF goal Isosource 1.5 at 50 mL/hr x 24 hrs that would provide 1800 kcal, 82 gm protein, 8 gm Fiber, 211 gm CHO and 910 mL free water   - Will monitor WOCN eval for pressure injury protocol micronutrient supplementation    Malnutrition:   % Weight Loss:  > 7.5% in 3 months (severe malnutrition)  % Intake:  </= 50% for >/= 5 days (severe malnutrition)  Subcutaneous Fat Loss:  Orbital region mild depletion and Upper arm region mild-moderate depletion (addional losses likely masked by edema)  Muscle Loss:  Temporal region mild depletion (addional losses likely masked by edema)  Fluid Retention:  Moderate 3+ generalized     Malnutrition Diagnosis: Severe malnutrition  In Context of: Chronic illness or disease        REASON FOR ASSESSMENT  Graciela Adrian is a 49 year old female seen by Registered Dietitian for Provider Order - \"Has been on clear liquid diet for 3 days\"      NUTRITION HISTORY  - Information obtained from chart:  - Guarded prognosis with advanced metastatic ovarian/fallopian tube cancer, receiving palliative chemo (plan for a second opinion at [facility] for the first week in October)   - Receives paracentesis ~once per month for likely chronic ascites   - No known food allergies    - Attempted to visit patient today --> on bipap, lethargic and slightly confused. She did not answer questions for writer today. No spouse present at bedside at this time     - Discussed patient with bedside RN. She " "reports that patient was experiencing nausea 2/2 chemotherapy PTA that was affecting PO    CURRENT NUTRITION ORDERS  Diet Order:     Clear Liquid     Current Intake/Tolerance:  MD notes today, 9/23:   - Patient c/o nausea  - \"Nutrition consulted today as she has been in the ICU for 3 days with poor intake; Likely need for feeding tube soon if mentation doesn't improve\"  - Weaning from bipap to NC as able   - \"Somewhat encephalopathic which limits history, but able to answer some questions\"  - \"Her oncologist is following and notes recent significant progression of cancer and decline in functional status, unlikely to tolerate further chemotherapy but has visit planned to [place] in the beginning of October to look into clinical research trials\"    Stooling: x3 BM on 9/22 (RN charts loose stools with blood); x2 BM on 9/23 thus far     NUTRITION FOCUSED PHYSICAL ASSESSMENT FOR DIAGNOSING MALNUTRITION)  Completed:  Yes Partial assessment - assessment limited by Bipap, BP cuffs, leg wraps, fluid shifts         Observed:    Muscle wasting (refer to documentation in Malnutrition section) and Subcutaneous fat loss (refer to documentation in Malnutrition section)    Obtained from Chart/Interdisciplinary Team:  Skin: \"New small, nonblanchable, reddened area to right sacrum\" --> WOCN consulted and eval pending   Edema: 3+ generalized     ANTHROPOMETRICS  Height: 5' 4\"  Admit weight: 154# (70.2 kg)  Body mass index is 26.55 kg/m .  Weight Status:  Overweight BMI 25-29.9  IBW: 54.5 kg   % IBW: 128%  Weight History: Patient has been losing weight. It appears (specifically per Care Everywhere) patient has lost ~24# over the past 3 months (13%). Currently, weight is up ~34# with fluid shifts     Vitals:    09/20/19 0500 09/21/19 0600 09/23/19 0600   Weight: 70.2 kg (154 lb 12.2 oz) 78.1 kg (172 lb 2.9 oz) 85.3 kg (188 lb 0.8 oz)     Wt Readings from Last 20 Encounters:   09/23/19 85.3 kg (188 lb 0.8 oz)   07/22/19 72.7 kg (160 lb 3.2 " oz)   04/22/19 81.6 kg (180 lb)   01/20/19 82.6 kg (182 lb)     Weights per Care Everywhere:   159 lbs on 9/12/2019  162 lbs on 8/28/2019  178 lbs on 6/26/2019    LABS  Labs reviewed  Na 134 (NL)  K 4 (NL)  Phos 2.6 (NL)  BUN 45 (H), Cr 1.09 (H) -- MONICA  LFTs elevated   No results found for: A1c  Recent Labs   Lab 09/23/19  0505 09/23/19  0504 09/22/19  1626 09/22/19  1410 09/22/19  0929 09/22/19  0520 09/21/19  1350 09/21/19  0953 09/21/19  0530 09/20/19  2354 09/20/19  1715  09/20/19  0854   GLC  --  87  --   --   --  103* 99  --  111* 115* 145*   < >  --    BGM 96  --  101* 84 111*  --   --  115*  --   --   --   --  116*    < > = values in this interval not displayed.       MEDICATIONS  Medications reviewed  Currently on 2 pressors  - Angiotensin II infusion at 33.7 mL/hr   - Norepinephrine ran 9/19-9/21, off 9/22, and resumed today 9/23  - Phenylephrine stopped 9/19   - Vasopressin stopped 9/22  LR IVF at 20 mL/hr     ASSESSED NUTRITION NEEDS PER APPROVED PRACTICE GUIDELINES:    Dosing Weight 58.5 kg - adjusted for overweight   Estimated Energy Needs: 6893-1950 kcals (30-35 Kcal/Kg)  Justification: repletion  Estimated Protein Needs: + grams protein (1.2-1.8+ g pro/Kg)  Justification: Repletion and hypercatabolism with critical illness  Estimated Fluid Needs: 3588-9416  mL (1 mL/Kcal)  Justification: maintenance and per provider pending fluid status    MALNUTRITION:  % Weight Loss:  > 7.5% in 3 months (severe malnutrition)  % Intake:  </= 50% for >/= 5 days (severe malnutrition)  Subcutaneous Fat Loss:  Orbital region mild depletion and Upper arm region mild-moderate depletion (addional losses likely masked by edema)  Muscle Loss:  Temporal region mild depletion (addional losses likely masked by edema)  Fluid Retention:  Moderate 3+ generalized     Malnutrition Diagnosis: Severe malnutrition  In Context of:  Chronic illness or disease    NUTRITION DIAGNOSIS:  Inadequate protein-energy intake related to CLD  limitation, poor mentation, and Bipap with decreased appetite 2/2 nausea with chemotherapy as evidenced by minimal intake <50% for at least 5 days, weight loss of 13% over 3 months meeting criteria for malnutrition       NUTRITION INTERVENTIONS  Recommendations / Nutrition Prescription  ADAT     Consider naso-enteric TF placement (pending goals of care discussion) as minimal PO x4 days this admission with poor mentation 2/2 decreased PO and weight loss PTA meeting criteria for malnutrition     Implementation  Nutrition education: Not appropriate at this time due to patient condition  Collaboration and Referral of Nutrition care: discussed pt POC with RN, patient was discussed during ICU rounds     Nutrition Goals  Diet tolerated >FL vs nutrition support start within 48 hrs (pending goals of goal discussion)      MONITORING AND EVALUATION:  Progress towards goals will be monitored and evaluated per protocol and Practice Guidelines        Cassie Wagner RD, LD  Clinical Dietitian

## 2019-09-23 NOTE — PHARMACY-VANCOMYCIN DOSING SERVICE
Pharmacy Vancomycin Note  Date of Service 2019  Patient's  1970   49 year old, female    Indication: Sepsis  Goal Trough Level: 15-20 mg/L  Day of Therapy: 5  Current Vancomycin regimen:  1500 mg IV intermittently    Current estimated CrCl = Estimated Creatinine Clearance: 65.2 mL/min (A) (based on SCr of 1.09 mg/dL (H)).    Creatinine for last 3 days  2019: 11:54 PM Creatinine 1.08 mg/dL  2019:  5:30 AM Creatinine 1.08 mg/dL;  1:50 PM Creatinine 0.95 mg/dL  2019:  5:20 AM Creatinine 0.94 mg/dL  2019:  5:04 AM Creatinine 1.09 mg/dL    Recent Vancomycin Levels (past 3 days)  2019: 10:50 PM Vancomycin Level 41.5 mg/L  2019: 10:30 PM Vancomycin Level 22.3 mg/L  2019:  4:24 PM Vancomycin Level 25.5 mg/L    Vancomycin IV Administrations (past 72 hours)                   vancomycin 1500 mg in 0.9% NaCl 250 ml intermittent infusion 1,500 mg (mg) 1,500 mg Given 19 1629                Nephrotoxins and other renal medications (From now, onward)    Start     Dose/Rate Route Frequency Ordered Stop    19 1500  norepinephrine (LEVOPHED) 16 mg in  mL infusion      0.03-0.4 mcg/kg/min × 85.3 kg  2.4-32 mL/hr  Intravenous CONTINUOUS 19 1448      19 2336  vancomycin place baez - receiving intermittent dosing      1 each Intravenous SEE ADMIN INSTRUCTIONS 19 2336      19 0100  piperacillin-tazobactam (ZOSYN) 4.5 g vial to attach to  mL bag      4.5 g  over 30 Minutes Intravenous EVERY 6 HOURS 19 2307               Contrast Orders - past 72 hours (72h ago, onward)    Start     Dose/Rate Route Frequency Ordered Stop    19 1600  perflutren diluted 1mL to 2mL with saline (OPTISON) diluted injection 9 mL     Note to Pharmacy:  NDC# 5684-7463-53    9 mL Intravenous ONCE 19 1551 19 7592          Interpretation of levels and current regimen:  Random level is  Supratherapeutic 24 hr after previous dose    Has serum  creatinine changed > 50% in last 72 hours: No    Urine output:  low    Renal Function: MONICA    Plan:  1.  Continue hold dosing for now  2.  Pharmacy will check trough levels as appropriate in AM 9/24.      Liz Pemberton RPH        .

## 2019-09-23 NOTE — PROGRESS NOTES
[facility]  Hospitalist Progress Note          Assessment and Plan:     Septic shock (H):  Remains on single pressor.  No source yet identified.  Graciela reports feeling ok today. Felt anxious last night so did not sleep well.  Remains on broad spectrum antibiotics.  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  Received blood Friday night and platelets. Will continue to watch counts.  Would transfuse platelets if < 10K, active bleeding or tap needed.  Could consider transfusing platelets to get >50K so that paracentesis and thoracentesis could be done as this would improve respiratory status. Platelets remain low at 22k today. Consider transfusing to tap lung for improved breathing and patient comfort.     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  It is doubtful that she will tolerate further treatment with chemotherapy.  Current goal is to have her recover from recent insult if possible.  Will see if her functional status improves.                Interval History:   no new complaints              Medications:   I have reviewed this patient's current medications               Physical Exam:   Blood pressure (!) 89/70, pulse 113, temperature 96.2  F (35.7  C), temperature source Axillary, resp. rate (!) 34, weight 85.3 kg (188 lb 0.8 oz), SpO2 93 %.        Vital Sign Ranges  Temperature Temp  Av.5  F (36.4  C)  Min: 97.4  F (36.3  C)  Max: 97.6  F (36.4  C)   Blood pressure No data recorded.       No data recorded.      Pulse No data recorded   Respirations Resp  Av.4  Min: 17  Max: 84   Pulse oximetry SpO2  Av.4 %  Min: 82 %  Max: 100 %         Intake/Output Summary (Last 24 hours) at 2019 1118  Last data filed at 2019 1000  Gross  per 24 hour   Intake 1731.23 ml   Output 300 ml   Net 1431.23 ml     Lungs:   Clear anteriorly     Cardiovascular:   normal apical pulses      Abdomen:   Distended, Fluid wave present     Musculoskeletal:   3+ pitting edema of LE                Data:   All laboratory data reviewed

## 2019-09-23 NOTE — PROGRESS NOTES
Antimicrobial Stewardship Team Note    Antimicrobial Stewardship Program - A joint venture between [facility] and [facility] to optimize antibiotic management.     Patient: Graciela Adrian  MRN: [id number]  Allergies: Patient has no known allergies.    Brief Summary: 49F admitted 9/19 from OSH ED with septic shock. Unclear source. Started on vasopressors and BiPAP.  Infectious Disease:   1. Septic Shock: blood and ascites culture negative.  No sign of SBP. On empiric vanco/zosyn/azithro.        Active Anti-infective Medications   (From admission, onward)                Start     Stop    09/20/19 2336  Vancomycin Place Arias - Receiving Intermittent Dosing  1 each,   Intravenous,   SEE ADMIN INSTRUCTIONS     Sepsis        --    09/20/19 0100  piperacillin-tazobactam  4.5 g,   Intravenous,   EVERY 6 HOURS     Sepsis        --    09/19/19 2315  azithromycin  500 mg,   Intravenous,   EVERY 24 HOURS     Sepsis        --          Assessment: All cultures negative to date.  Patient nares MRSA is negative.  Recommend to discontinue vancomycin at this time.      Recommendations:  Medication Change:   - Recommend to discontinue vancomycin .    Pharmacy took the following actions:  .    Discussed with ID Staff Dr. Leonard Cam, [facility]

## 2019-09-23 NOTE — PROGRESS NOTES
Pt requiring increased pressors, had various critical labs. MD notified and had conversation with pts . Code status switched to DNR. Awaiting arrival of pts mother and father for further conversation of goals of care. Katharine Samuels RN on 9/23/2019 at 5:07 PM

## 2019-09-23 NOTE — PROGRESS NOTES
"  [facility] WO Nurse Inpatient Wound Assessment   Initial Assessment of wound(s) on pt's:    Coccyx to ITs and right anterior dorsal flexor foot    WOC NURSE ASSESSMENT    Multiple acute DTIs to the sacral/ coccyx area.  Plan is to provide vigilant PIP measures and hygiene cares  DTI to right anterior dorsal flexor foot, from socks, plan is vigilant PIP measures    WO NURSE TREATMENT PLAN    Plan of care for wound located sacral/ coccyx/ buttocks area- effective now  Reposition side to side when in bed, if pt refusing repositioning then must document attempted interventions- ie- if in pain, were pain meds tried, etc.  Also, must document that the provider, charge nurse and nurse manager have been updated with having a pt refusing cares.    Provide thorough perineal cares using Rubina Cleanse and Protect and wipes, dry    Placing dressing to intact epidermis on sacral/ buttocks  1. Clean intact skin with saline, pat dry  2. Paint periwound tissue with No Sting Skin Prep (#[id number]) and allow to dry thoroughly  3. Press a Mepilex  Sacral Dressing (PS#[id number])  to the area, making sure to conform nicely to skin curvatures  Press a Mepilex  Sacral Dressing (PS#[id number])  to the area, making sure to conform nicely to skin curvatures (begin placing the Mepilex \"upside down\" as high on the coccyx as able, then begin the placement of the dressing at the most distal aspect, smooth upward along the gluteal cleft and then side to side.  NOTE- begin placement as high as possible and may be positioned off-set or not \"square\" to the body)   4. Time and date dressing change and luciana with a \"T\" for treatment of a wound  5. Reposition pt every 1 to 2 hours when in bed and hourly when up to the chair to relieve pressure and promote perfusion to tissue  NOTE- continue to assess under dressing and document findings BID, per protocol  Nursing to notify Provider(s) and re-consult the [facility] Nurse if wound(s) deteriorate or new skin concerns    Plan of " "care for bilateral feet  PERFORM \"GOOD FOOT CARE\" AT ALL TIMES:  1. CLEAN feet daily with a gentle soap and water, making sure to clean between the toes. Dry thoroughly, especially between the toes. Be careful when drying between toes to not use a sawing-action, this may cut the skin between the toes.   2. Spray the skin from toes to knees, not between the toes, with a thick layer of Rubina Cleanse and Protect, massage into skin and allow sit on skin for 5-10 minutes then wipe or rinse off (the Rubina will \"condition\" the skin by loosening crusty debris, moisturizing and cleaning the skin)  3. LOTION from toes to knees, not between toes (lotion moisturizes and too much moisture between toes may cause a fungal rash).    4.  If noticing moisture, itching or odor between the toes dust with antifungal powder, think Desenex, twice a day x 4-5 days.    5. Clean SOCKS every day,   Keep heels elevated at all times, at least one pillow under each calf from knees to heels, assuring heels floating.      [facility] Nurse follow-up plan: weekly and prn  OBJECTIVE DATA    Patient history according to provider notes: Septic shock (H):  Remains on single pressor.  No source yet identified    Mane Risk Assessment  Sensory Perception: 1-->completely limited    Moisture: 4-->rarely moist   Activity: 1-->bedfast     Mobility: 2-->very limited   Nutrition: 3-->adequate   Friction and Shear: 1-->problem      Positioning: Mepilex dressing and Pillows,     Mattress:  Standard , Low air loss mattress with pulsation     Current diet  Orders Placed This Encounter      Clear Liquid Diet      Moisture Management:  Urinary Catheter  o Catheter secured? Yes     o I/O last 3 completed shifts:  o In: 1925.92 [I.V.:1675.92]  o Out: 317 [Urine:317]    Labs:   Recent Labs   Lab Test 09/23/19  0504   ALBUMIN 1.7*   HGB 10.9*   INR 2.68*   WBC 9.9         [facility] NURSE PHYSICAL EXAM    Wound Assessment (location):   Sacrum to coccyx and fleshy buttocks  Wound " History:  Acute pressure injuries, pt has been using the TAPS for positioning  Date of last photo 09/23/2019    Sacrum to coccyx intact with minimal blanchable erythema noted along the gluteal cleft. There is an area of dark purple, non blanchable erythema just to the right of midline coccyx, superior to anus, 0.9cm x 1.2cm x 0.0cm   distal to that site, right, there is an area of dark purple- maroon, intact epidermis 1.1cm x 1.4cm x 0.0cm  left buttock, almost mirroring the superior right wound, is a light purple, blanchable area, about 0.3cm x 0.3cm  Just distal is a triangular shaped, lighter, brighter purple maroon, nonblanchable erythema, intact epidermis0.5cm x 0.6cm x 0.0cm    assessment of right dorsal flexor foot  Nursing had moved the socks when I was in the room and revealed a patchy line of dark maroon, nonblanchable erythema, intact epidermis where the elastic from the socks had been.    0.3cm x 2.5cm  09/23/2019          [facility] NURSE INTERVENTIONS    Assessed gluteal cleft and right anterior flexor foot  Wound Care: perineal cares and PIP measures  Supplies: discussed with RN  Discussed plan of care with Nurse  Education provided: Not appropriate today     Fabiana Ramirez RN

## 2019-09-23 NOTE — PROGRESS NOTES
"SPIRITUAL HEALTH SERVICES  Spiritual Assessment Progress Note  [facility] ICU   met briefly with pt and her , Saleem.  Pt was restless and named \"I'm just trying to get through this.\"  Pt requested that I pray for her - on my own.      informed them of SHS available for support as needed.                                                                                                                                            Patti Pitts M.Div., [facility]  Staff    Pager [phone]  "

## 2019-09-23 NOTE — PLAN OF CARE
Pt AOx4, forgetful.  Anxious overnight, prn ativan administered.  BIPAP in place.  Sats stable.  Angiotensin gtt continues, MAP maintained >70.  Pt refusing repositioning, writer explained to pt risks of skin breakdown, pt continues to refuse.  Denies pain. No BM this shift.  Low UOP per MD herbert aware.  Continue to monitor.

## 2019-09-23 NOTE — PROGRESS NOTES
Pt remains on bipap 12/5 50% t/o the day. Alarm limit volume set at 10.  Will continue to follow.  Richi Cmapos, RT  9/23/2019

## 2019-09-23 NOTE — PROGRESS NOTES
[facility] Intensive Care Progress Note    Date of Service (when I saw the patient): 09/23/2019     Assessment & Plan   Graciela Adrian is a 49 year old female with PMH of metastatic clear cell ovarian cancer who was admitted on 9/19/2019 for shock (likely sepsis, source unknown).    Neurology/Pain/Sedation:  # Generalized anxiety disorder  - Lorazepam PRN for anxiety  - Restart PTA sertraline 50 mg daily once tolerating PO meds  # Myalgia secondary to critical illness and cancer  - PRN oxycodone for analgesia  #.  Encephalopathy:  In setting of critical illness, probable sepsis  - Precedex gtt PRN for agitation, goal RASS 0      Cardiovascular:  # Shock: likely septic  Continue giapreza--also restarting norepi today.  Lactic acid improving after discontinuation of epinephrine.  Persistently low UOP at 10-22 mL/hr, but stable creatinine.  Echo shows normal LV function, unable to assess RV.  No pericardial effusion.  - Trend lactate  - Has h/o PE; obstructive shock may be playing a role, but unable to fully assess for PE given renal dysfunction.  That said, she did not have significant dyspnea/hypoxemia/chest pain on admission, and admission chest CT did not show evidence of elevated R heart or PA pressures (difficult to assess on echo).  Given this I am hesitant to start therapeutic AC given the thrombocytopenia and coagulopathy noted below.    Pulmonary:        # Hypoxemia likely 2/2 large left pleural effusion and in setting of sepsis  # Hx of Pulmonary Embolism on CT scan from 7/22  Has large left pleural effusion, however, Plt are very low making thoracentesis risky at this time.  She is also still requiring pressors, so large fluid shift not advisable at this time.  Currently on BiPAP.  Saturates well on NC but becomes anxious leading to tachypnea and asks for BiPAP mask to be replaced.  -  Continue BiPAP to maintain oxygen saturation 92-96%  -  Hold anticoagulation for  PE with low Plt  -  Consider Plt transfusion for Plt > 50 for left thoracentesis once hemodynamically stable  -Increased bipap to 12/5 for improved tidal volumes.    FiO2 (%): 40 %  Resp: 30  (still on bipap 10/5)    Renal/Pluids/Electrolytes  # MONICA: likely secondary to hypovolemia  # Hyponatremia: ?SIADH, improving  # Decreased UOP: not fluid responsive  Creatinine 1.09 compared to 0.95 on 9/22/19.  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  Low UOP was not fluid responsive to albumin.    ID:         # Septic shock with unknown source: blood and urine Cx negative, SBP labs negative  - Continue empiric vanco/zosyn/azithro    GI/Nutrition:  # Elevated LFTs, improving: shock liver vs metastatic ovarian carcinoma  # Nausea: infectious vs chemotherapy  # Severe malnutrition in Context of: Chronic illness or disease  Clear liquid diet  - Nutrition consulted today as she has been in the ICU for 3 days with poor intake  - likely need for feeding tube soon if mentation doesn't improve    Endocrine:  # Stress induced hyperglycemia  - PRN insulin    Heme/Onc:  # Pancytopenia in setting of recent chemo  # Metastatic clear cell ovarian carcinoma  - Trend CBC  - Followed by [facility]--appreciate their recs.  -wbc 9.9 ok, hgb 10.9 stable, pltlts 22 remain low but no active bleeding; goal 10k for now.    DVT Prophylaxis: Pneumatic Compression Devices  GI Prophylaxis: PPI    Restraints: Restraints for medical healing needed: NO      Main Plans for Today   Reduce anxiety, wean from BiPAP to NC    Interval History   9/19/19: admitted from [place] ED for septic shock of unknown source, diagnostic abdominal paracentesis completed (10 mL), placed on 3 pressors, hyperkalemia reversed with insulin/dextrose/albuterol and cardiac membrane stabilized with Ca, started vanco/zosyn/azithro  9/20/19: enrolled in vit C study, Lovenox held for thrombocytopenia, oncology consulted, shock liver, received pRBC  9/21/19: switched  pressor from epinephrine to angiotensin II with improvement in lactic acid, some hematochezia overnight, given platelet infusion overnight  9/22/19: decreased pressor requirements to only AT II      Somewhat encephalopathic which limits history, but able to answer some questions.  Today, she reports feeling a bit better than yesterday.  She notes that she is anxious about breathing without the BiPAP mask and feels much more comfortable with it in place.  She denies pain at first then reports diffuse, generalized pain.  She is unable to remember events thoughout the night or her reason for hospitalization.  She remains on broad spectrum Abx.  Current treatment goal is to recover from recent insult if possible.  Her oncologist is following and notes recent significant progression of cancer and decline in functional status, unlikely to tolerate further chemotherapy but has visit planned to [place] in the beginning of October to look into clinical research trials.    Physical Exam   Temp: 97.3  F (36.3  C) Temp src: Axillary Temp  Min: 97.3  F (36.3  C)  Max: 97.7  F (36.5  C) BP: 106/81 Pulse: 103 Heart Rate: 105 Resp: 30 SpO2: 95 % O2 Device: BiPAP/CPAP Oxygen Delivery: 3 LPM  Vitals:    09/20/19 0500 09/21/19 0600 09/23/19 0600   Weight: 70.2 kg (154 lb 12.2 oz) 78.1 kg (172 lb 2.9 oz) 85.3 kg (188 lb 0.8 oz)     I/O last 3 completed shifts:  In: 1925.92 [I.V.:1675.92]  Out: 317 [Urine:317]    General: lying in bed, appears sick, afebrile, shifting uncomfortably in bed  HEENT:  Normocephalic, atraumatic  NECK:  supple  Neurologic: alert to person, place, and year; very anxious; unable to recall why she is hospitalized; able to move all extremities; strength 4/5 for hand squeeze bilaterally  Cardiovascular: tachycardic, regular rhythm, no murmur/gallop/rub, moderate pitting edema to legs bilaterally, feet cool but good capillary refill bilaterally  Respiratory: tachypneic, decreased on left side, no wheezing bilaterally,  weak nonproductive cough occasionally off BiPAP  GI: distended, normal bowel sounds, mildly tender with no guarding  Skin/Extremities: dry, bruising present at sites of lines  Lines: Ecchymosis with no erythema or discharge at entry site for Port  Current lines are required for patient management  Psych: somewhat encephalopathic and agitated at times requiring precedex    Medications     angiotensin II (GIAPREZA) 2.5 mg in  mL (adult std) infusion 20 ng/kg/min (09/21/19 1927)     dexmedetomidine       lactated ringers 20 mL/hr at 09/22/19 1150     norepinephrine Stopped (09/21/19 1027)     phenylephrine Stopped (09/20/19 1350)     study - vitamin C vs. placebo (IDS #5174) infusion 25 mL/hr (09/22/19 0517)     vasopressin (PITRESSIN) infusion ADULT (40 mL) Stopped (09/22/19 1158)       azithromycin  500 mg Intravenous Q24H     pantoprazole (PROTONIX) IV  40 mg Intravenous Daily with breakfast     piperacillin-tazobactam  4.5 g Intravenous Q6H     sodium chloride (PF)  10 mL Intracatheter Q8H     vancomycin place baez - receiving intermittent dosing  1 each Intravenous See Admin Instructions       Data   Recent Labs   Lab 09/23/19  0504 09/22/19  0930 09/22/19  0520  09/21/19  1350 09/21/19  0530  09/20/19  0430   WBC 9.9 5.5 4.6  --  2.4* 2.3*   < > 12.1*   HGB 10.9* 10.4* 10.3*  --  10.3* 10.4*   < > 9.4*   MCV 89 88 88  --  87 87   < > 86   PLT 22* 25* 28*   < > 10* 14*   < > 21*   INR 2.68* 2.58*  --   --   --   --   --  2.25*     --  130*  --  131* 127*   < > 126*   POTASSIUM 4.0  --  4.2  --  4.7 4.8   < > 5.4*   CHLORIDE 106  --  104  --  105 102   < > 100   CO2 17*  --  13*  --  16* 15*   < > 15*   BUN 45*  --  42*  --  40* 40*   < > 44*   CR 1.09*  --  0.94  --  0.95 1.08*   < > 1.10*   ANIONGAP 11  --  13  --  10 10   < > 11   WALDEMAR 6.3*  --  6.0*  --  6.4* 6.4*   < > 6.1*   GLC 87  --  103*  --  99 111*   < > 115*   ALBUMIN 1.7*  --   --   --   --  2.1*   < > 0.9*   PROTTOTAL 4.0*  --   --   --    --  3.9*   < > 3.8*   BILITOTAL 4.7*  --   --   --   --  4.6*   < > 1.3   ALKPHOS 469*  --   --   --   --  331*   < > 503*   ALT 97*  --   --   --   --  130*   < > 168*   *  --   --   --   --  585*   < > 904*    < > = values in this interval not displayed.     No results found for this or any previous visit (from the past 24 hour(s)).     Cynthia Nieto, MS4    Physician Attestation   I, Jose Nixon, was present with the medical student who participated in the service and in the documentation of the note.  I have verified the history and personally performed the physical exam and medical decision making.  I agree with the assessment and plan of care as documented in the note.      I personally reviewed vital signs, medications and labs.    See my edits to note above in blue.  I personally reviewed/interpreted lab results (see a/p).    Critical care time today 45 min    Jose Nixon MD  Date of Service (when I saw the patient): 09/23/19

## 2019-09-24 NOTE — PLAN OF CARE
Problem: Adult Inpatient Plan of Care  Goal: Absence of Hospital-Acquired Illness or Injury  Outcome: No Change  Goal: Rounds/Family Conference  Outcome: No Change     Problem: Adjustment to Illness (Sepsis/Septic Shock)  Goal: Optimal Coping  Outcome: No Change     Problem: Bleeding (Sepsis/Septic Shock)  Goal: Absence of Bleeding  Outcome: No Change   Pt continues to increase both pressor to max. BP unstable and critically low at time. Bipap increased to 60% to maintain minimal Sats. Dilaudid given X2 this shift to help with comfort. Blood sugars low, glucose given X1. Labs came back critical/abnormal and MD made decision not to escalate cares. Will continue to monitor.

## 2019-09-24 NOTE — PROGRESS NOTES
Pt currently lethargic, and on BiPAP. Anxious at times earlier in day, but precedex gtt off at 1657. Pt not anxious since then, and opens eyes to voice. Somewhat conversant with family. Pt required the addition of a second vasopressor, levophed this shift to maintain MAP>65 OR SBP>100. Titrated levophed throughout shift and currently at .3. Verbal instruction from MD to titrate to .4 before adding additional pressor. Pt tolerated 3L NC from [id number]. Pt felt severe air hunger and was tachypneic requiring reapplication of BiPAP at 50%. Low UOP, not a change from previous. Will CTM closely. Katharine Samuels RN on 9/23/2019 at 10:50 PM

## 2019-09-24 NOTE — PROVIDER NOTIFICATION
Provider notified on critical labs, provider wants to not escalate cares and will reevaluate in am.

## 2019-09-24 NOTE — PROGRESS NOTES
"Shortly after start of shift was approached by pt's spouse who indicated they would like to transition to comfort measures only \"in a little while\".      At 0827 they requested removal of BIPAP and discontinuation of pressors.      Pt  peacefully at 0903 with  and parents at bedside.  "

## 2019-09-24 NOTE — PROGRESS NOTES
Called to pronounce death.  No heart sounds on 60 seconds auscultation.  Pupils 8-9mm and unresponsive bilaterally.  No response to painful stimulus.  Time of death 09:03.  Family at bedside. Preliminary cause of death:  Organ failure due to metastatic recurrent clear cell ovarian carcinoma

## 2019-09-24 NOTE — PROGRESS NOTES
Had subsequent discussion with family.  They are now ready to transfer to comfort measures only.  Comfort measures order set placed.  Non-comfort orders cancelled.

## 2019-09-24 NOTE — PROGRESS NOTES
[facility] Intensive Care Progress Note    Date of Service (when I saw the patient): 09/24/2019     Assessment & Plan   Graciela Adrian is a 49 year old female with PMH of metastatic clear cell ovarian cancer who was admitted on 9/19/2019 for shock (likely sepsis, source unknown).    Neurology/Pain/Sedation:  # Myalgia secondary to critical illness and cancer  - IV dilaudid  #.  Encephalopathy:  In setting of critical illness, probable sepsis  - Precedex gtt PRN for agitation, goal RASS 0      Cardiovascular:  # Shock: likely septic  Continue giapreza and norepi.  Overall worsening picture with steadily increasing lactate despite pressor support, low central venous oxyhemoglobin (though no apparent cardiogenic or obstructive shock on echo), worsening acidosis despite bicarb drip.    Pulmonary:        # Hypoxemic respiratory failure, bipap dependent  # Hx of Pulmonary Embolism on CT scan from 7/22  Has large left pleural effusion, however,  states she would not want it drained under any circumstances.  Currently requiring BiPAP to maintain sats and ventilation.  -  Continue BiPAP to maintain oxygen saturation 92-96%  -  Hold anticoagulation for PE with low Plt  -Increased bipap to 12/5 for improved tidal volumes.    FiO2 (%): 60 %  Resp: 18  (on bipap 12/5)    Renal/Pluids/Electrolytes  # MONICA: creat today 0.81  # Hypernatremia: hypernatremic today--possibly from bicarb therapies in last 24 hours.  Acidosis is worsening despite bicarb therapies this morning--will stop bicarb drip.  # Decreased UOP: Creatinine 0.81, but oliguria persists despite volume repletion.    ID:         # Septic shock with unknown source: blood and urine Cx negative, SBP labs negative  - Continue empiric vanco/zosyn/azithro    GI/Nutrition:  # Elevated transaminases: shock liver vs metastatic ovarian carcinoma, total bilirubin now elevated as well at 2.7  # Nausea: infectious vs chemotherapy  #  Severe malnutrition in Context of: Chronic illness or disease  Clear liquid diet  - likely need for feeding tube soon if mentation doesn't improve    Endocrine:  # Stress induced hyperglycemia  - PRN insulin    Heme/Onc:  # Pancytopenia in setting of recent chemo  # Metastatic clear cell ovarian carcinoma  - Trend CBC  - Followed by [facility]--appreciate their recs.  -wbc 13.5 rising, hgb 8.5 acceptable, pltlts 24 remain low but no active bleeding; goal 10k for now.    DVT Prophylaxis: Pneumatic Compression Devices  GI Prophylaxis: PPI    Restraints: Restraints for medical healing needed: NO    Interval History   9/19/19: admitted from [place] ED for septic shock of unknown source, diagnostic abdominal paracentesis completed (10 mL), placed on 3 pressors, hyperkalemia reversed with insulin/dextrose/albuterol and cardiac membrane stabilized with Ca, started vanco/zosyn/azithro  9/20/19: enrolled in vit C study, Lovenox held for thrombocytopenia, oncology consulted, shock liver, received pRBC  9/21/19: switched pressor from epinephrine to angiotensin II with improvement in lactic acid, some hematochezia overnight, given platelet infusion overnight  9/22/19: decreased pressor requirements to only AT II    9/24/19:  Overall worsening condition including worsened hemodynamics, increased pressor needs, increased respiratory needs and worsened acidosis.  Family meeting held 9/23, pt DNR/DNI with limited escalation. May transition to comfort measures only today.  Unable to obtain history directly from patient due to worsening encephalopathy.      Physical Exam   Temp: 97.1  F (36.2  C) Temp src: Axillary Temp  Min: 96.2  F (35.7  C)  Max: 97.5  F (36.4  C) BP: (!) 62/50 Pulse: 115 Heart Rate: 114 Resp: 18 SpO2: 96 % O2 Device: BiPAP/CPAP Oxygen Delivery: 2 LPM  Vitals:    09/21/19 0600 09/23/19 0600 09/24/19 0500   Weight: 78.1 kg (172 lb 2.9 oz) 85.3 kg (188 lb 0.8 oz) 80.5 kg (177 lb 7.5 oz)     I/O last 3 completed  shifts:  In: 1640.09 [I.V.:1640.09]  Out: 175 [Urine:175]    General: lying in bed, appears sick, afebrile, shifting uncomfortably in bed  HEENT:  Normocephalic, atraumatic  NECK:  supple  Neurologic: alert to person, place, and year; very anxious; unable to recall why she is hospitalized; able to move all extremities; strength 4/5 for hand squeeze bilaterally  Cardiovascular: tachycardic, regular rhythm, no murmur/gallop/rub, moderate pitting edema to legs bilaterally, feet cool but good capillary refill bilaterally  Respiratory: tachypneic, decreased on left side, no wheezing bilaterally, weak nonproductive cough occasionally off BiPAP  GI: distended, normal bowel sounds, mildly tender with no guarding  Skin/Extremities: dry, bruising present at sites of lines  Lines: Ecchymosis with no erythema or discharge at entry site for Port  Current lines are required for patient management  Psych: somewhat encephalopathic and agitated at times requiring precedex    Medications     angiotensin II (GIAPREZA) 2.5 mg in  mL (adult std) infusion 40 ng/kg/min (09/23/19 2343)     dexmedetomidine Stopped (09/23/19 1657)     lactated ringers 20 mL/hr at 09/23/19 0800     norepinephrine 0.37 mcg/kg/min (09/24/19 0258)     sodium bicarbonate 25 mEq/hr (09/24/19 0650)     study - vitamin C vs. placebo (IDS #5174) infusion 25 mL/hr (09/23/19 2345)       azithromycin  500 mg Intravenous Q24H     pantoprazole (PROTONIX) IV  40 mg Intravenous Daily with breakfast     piperacillin-tazobactam  4.5 g Intravenous Q6H     sodium chloride (PF)  10 mL Intracatheter Q8H     vancomycin place baez - receiving intermittent dosing  1 each Intravenous See Admin Instructions       Data   Recent Labs   Lab 09/24/19  0503 09/23/19  1433 09/23/19  0504 09/22/19  0930 09/22/19  0520   WBC 13.5*  --  9.9 5.5 4.6   HGB 8.5*  --  10.9* 10.4* 10.3*   MCV 96  --  89 88 88   PLT 24* 33* 22* 25* 28*   INR 5.62*  --  2.68* 2.58*  --    *  --  134  --   130*   POTASSIUM 3.1*  --  4.0  --  4.2   CHLORIDE 120*  --  106  --  104   CO2 10*  --  17*  --  13*   BUN 35*  --  45*  --  42*   CR 0.81  --  1.09*  --  0.94   ANIONGAP 22*  --  11  --  13   WALDEMAR <5.0*  --  6.3*  --  6.0*   GLC 28*  --  87  --  103*   ALBUMIN 0.8*  --  1.7*  --   --    PROTTOTAL 2.3*  --  4.0*  --   --    BILITOTAL 2.7*  --  4.7*  --   --    ALKPHOS 282*  --  469*  --   --    ALT 58*  --  97*  --   --    *  --  164*  --   --      No results found for this or any previous visit (from the past 24 hour(s)).     Labs personally reviewed/interpreted:  See a/p    Critical care time today:  35 min

## 2019-09-24 NOTE — DISCHARGE SUMMARY
Physician Discharge Summary     Patient ID:  Graciela Adrian  [id number]  49 year old  1970    Admit date: 2019    Discharge date and time: 2019     Admitting Physician: Chivo Oviedo MD     Discharge Physician: Dr. Jose Nixon    Admission Diagnoses: septic shock  Septic shock (H)    Discharge Diagnoses: shock of unknown etiology, multiorgan failure, respiratory failure requiring NIPV, recurrent clear cell ovarian carcinoma widely metastatic    Admission Condition: critical    Discharged Condition:     Indication for Admission: see discharge diagnoses    Hospital Course:   Graciela Adrian is a 49 year old woman with widely metastatic clear cell ovarian cancer who was admitted to [facility] after presentation to [place] ED on 19 with suspected septic shock of unknown source.  Blood and urine cultures were obtained that remained negative for infectious source throughout her hospitalization.  A CT Chest and Abdomen was performed with no sign of infection.  She was fluid resuscitated and placed on stress dose steroids and broad spectrum Abx including vancomycin, zosyn, and azithromycin.  She was also placed on 3 pressors including epinephrine, norepinephrine, and vasopressin for hemodynamic support.  She was found to be hyperkalemic and her cardiac membrane was stabilized with Ca and hyperkalemia was reversed with insulin and glucose and albuterol.  Her lovenox (prescribed for previous PE) was held due to thrombocytopenia.    On , gyn onc was consulted, diagnostic abdominal paracentesis was obtained (remained negative for infectious source throughout hospitalization), she developed MONICA, and her liver function decreased (either due to shock liver secondary to hypoperfusion or metastasis of her cancer).  She received pRBC for anemia and platelets for thrombocytopenia.    On , epinephrine was switched to ATII in an attempt to decrease her lactic acid with some initial  improvement.  She also had hematochezia overnight and required a platelet infusion.  She also was intermittently on BiPAP for hypoxia.  She also became hyponatremic, likely due to SIADH secondary to cancer.  On , her pressor requirements decreased.    On , she became encephalopathic and had increased oxygen and pressor requirements.  She became tachypneic and her lactate began to rise again.  She was given bicarb and calcium.  Levophed was reinitiated.  She was hypoxic despite NIPV.  A stat echo showed normal LV function, difficult assessment of RV function, and no pericardial effusion suggested against obstructive shock.  She went into multisystem organ failure.  After discussion with family explaining that she was not improving despite antibiotic therapy and that her worsening condition was likely due to her cancer, it was decided that she would not want to pursue aggressive measures and to continue to manage her shock and respiratory failure but not escalate cares.  Time was made for family to visit with her.    On , she was transitioned to comfort cares per family request once everyone had had a chance to visit her.  She  later in the day.  Although she was initially believed to have septic shock, no source of infection was ultimately found; it is therefore unclear that this was septic shock and may have instead been due to worsening liver failure in the setting of progressive cancer.    Consults: gyn onc    Significant Diagnostic Studies: see hospital course    Treatments: see hospital course    Disposition:       Signed:  Jose Hussain  2019  9:11 AM

## 2019-09-24 NOTE — PROVIDER NOTIFICATION
Intensivist and bedside nurse notified of critical lab values drawn from PICC line of CO2 (bicarb) of 10, and blood glucose of 28. Finger stick obtained with blood glucose of 70. D50 given.

## 2019-09-25 LAB
BACTERIA SPEC CULT: NO GROWTH
BLD PROD TYP BPU: NORMAL
NUM BPU REQUESTED: 1
SPECIMEN SOURCE: NORMAL

## 2019-09-26 LAB
BACTERIA SPEC CULT: NO GROWTH
BACTERIA SPEC CULT: NO GROWTH
Lab: NORMAL
Lab: NORMAL
SPECIMEN SOURCE: NORMAL
SPECIMEN SOURCE: NORMAL

## 2025-05-28 NOTE — PROCEDURES
[facility]    Central line  Date/Time: 9/20/2019 8:50 AM  Performed by: Jose Nixon MD  Authorized by: Jose Nixon MD     UNIVERSAL [employer]OTOCOL   Site Marked: Yes  Prior Images Obtained and Reviewed:  Yes  Required items: Required blood products, implants, devices and special equipment available    Patient identity confirmed:  Verbally with patient  NA - No sedation, light sedation, or local anesthesia  Confirmation Checklist:  Patient's identity using two indicators  Time out: Immediately prior to the procedure a time out was called    Preparation: Patient was prepped and draped in usual sterile fashion       ANESTHESIA    Local Anesthetic: Lidocaine 1% without epinephrine      SEDATION    Patient Sedated: No      Preparation: skin prepped with 2% chlorhexidine  Skin prep agent dried: skin prep agent completely dried prior to procedure  Sterile barriers: all five maximum sterile barriers used - cap, mask, sterile gown, sterile gloves, and large sterile sheet  Hand hygiene: hand hygiene performed prior to central venous catheter insertion  Location details: right internal jugular  Patient position: reverse Trendelenburg  Catheter type: triple lumen  Pre-procedure: landmarks identified  Ultrasound guidance: yes  Sterile ultrasound techniques: sterile gel and sterile probe covers were used  Number of attempts: 2  Successful placement: no    PROCEDURE   Patient Tolerance:  Patient tolerated the procedure well with no immediate complications  Describe Procedure: Able to access vein easily using ultrasound guidance but unable to pass wire past ~15 cm on multiple attempts.  Will abort and request PICC line placement.    Time of Sedation in Minutes by Physician:  0        
Procedure: Arterial Line Placement    Consent: obtained, signed after risk benefit explained and all questions answered to the best of my knowledged and on file in the chart.    Location: Arterial line placement into the right radial artery.    Universal Protocol: Patient Identification was verified, time out was performed, site marking N/A, Imaging data N/A. Full Barrier precaution done: Hands washed, mask, gloves, gown, cap and eye protection all used.    Indication: Monitoring of BP and frequent ABG blood draw in an hemodynamically unstable patient.    Narrative: Area prepped with chlorhexedine and draped in sterile fashion. 1% lidocaine injected subcutaneously for local anesthesia. Arterial catheter inserted using seldinger technique and sutured to the skin. Distal blood flow (Skin color and temperature) preserved and good arterial wave form documented. Ultrasound was used for line placement.     Complications: No apparent complication.    Procedure performed by Chivo Oviedo MD on September 20, 2019             
[facility]    Procedure: Abdominal paracentesis  Date/Time: 9/20/2019 12:31 PM  Performed by: Jose Nixon MD  Authorized by: Jose Nixon MD     UNIVERSAL [employer]OTOCOL   Site Marked: Yes  Prior Images Obtained and Reviewed:  Yes  Required items: Required blood products, implants, devices and special equipment available    Patient identity confirmed:  Verbally with patient  NA - No sedation, light sedation, or local anesthesia  Confirmation Checklist:  Patient's identity using two indicators, relevant allergies, procedure was appropriate and matched the consent or emergent situation and correct equipment/implants were available  Time out: Immediately prior to the procedure a time out was called    Preparation: Patient was prepped and draped in usual sterile fashion      SEDATION    Patient Sedated: No    PROCEDURE   Patient Tolerance:  Patient tolerated the procedure well with no immediate complications  Describe Procedure: Ultrasound was used to identify a good pocket 2 cm medial and superior to patient's superior iliac crest on L.  Color doppler flow did not show any significant vasculature at the site.  A 22 gauge spinal needle was used to draw off 10 ml of cloudy straw-colored fluid.  Sent to lab for analysis.  No complications, tolerated well.  Time of Sedation in Minutes by Physician:  0      
electronic

## (undated) DEVICE — SPONGE RAY-TEC 4X4" 7317

## (undated) DEVICE — ESU LIGASURE IMPACT OPEN SEALER/DVDR CVD LG JAW LF4418

## (undated) DEVICE — DRSG DRAIN 4X4" 7086

## (undated) DEVICE — SU VICRYL 0 CT-2 27" J334H

## (undated) DEVICE — SU SILK 2-0 SH 30" K833H

## (undated) DEVICE — SPONGE KITTNER 31001010

## (undated) DEVICE — LINEN TOWEL PACK X5 5464

## (undated) DEVICE — SU VICRYL 0 CT-1 27" UND J260H

## (undated) DEVICE — DRAPE SHEET REV FOLD 3/4 9349

## (undated) DEVICE — CATH TRAY FOLEY SURESTEP 16FR WDRAIN BAG STLK LATEX A300316A

## (undated) DEVICE — BNDG ABDOMINAL BINDER 9X45-62" 79-89071

## (undated) DEVICE — GLOVE PROTEXIS MICRO 6.5  2D73PM65

## (undated) DEVICE — DRAPE LEGGINGS 8421

## (undated) DEVICE — VESSEL LOOPS RED MAXI

## (undated) DEVICE — SOL WATER IRRIG 1000ML BOTTLE 2F7114

## (undated) DEVICE — DRSG TELFA ISLAND 4X8" 7541

## (undated) DEVICE — SU ETHIBOND 1 CT-1 30" X425H

## (undated) DEVICE — DRAIN JACKSON PRATT 15FR ROUND SIL LF JP-2229

## (undated) DEVICE — STPL LINEAR CUT 30X3.5MM TX30B

## (undated) DEVICE — DRSG TELFA ISLAND 4X14" 7544

## (undated) DEVICE — SUCTION CANISTER MEDIVAC LINER 3000ML W/LID 65651-530

## (undated) DEVICE — Device

## (undated) DEVICE — PREP SKIN SCRUB TRAY 4461A

## (undated) DEVICE — DRAIN JACKSON PRATT RESERVOIR 100ML SU130-1305

## (undated) DEVICE — DRAPE CV SPLIT II 147X106" 9158

## (undated) DEVICE — GLOVE BIOGEL PI ULTRATOUCH G SZ 6.5 42165

## (undated) DEVICE — DRAPE MAYO STAND 23X54 8337

## (undated) DEVICE — STPL SKIN PROXIMATE 35 WIDE PMW35

## (undated) DEVICE — WIPES FOLEY CARE SURESTEP PROVON DFC100

## (undated) DEVICE — SU PDS II 1 TP-1 48" Z880G

## (undated) DEVICE — BARRIER SEPRAFILM 5X6" SINGLE SHEET 4301-02

## (undated) DEVICE — GLOVE PROTEXIS BLUE W/NEU-THERA 6.5  2D73EB65

## (undated) DEVICE — ESU GROUND PAD UNIVERSAL W/O CORD

## (undated) DEVICE — CLIP HORIZON MED BLUE 002200

## (undated) RX ORDER — CEFAZOLIN SODIUM 2 G/100ML
INJECTION, SOLUTION INTRAVENOUS
Status: DISPENSED
Start: 2019-01-01

## (undated) RX ORDER — NEOSTIGMINE METHYLSULFATE 1 MG/ML
VIAL (ML) INJECTION
Status: DISPENSED
Start: 2019-01-01

## (undated) RX ORDER — ALBUMIN, HUMAN INJ 5% 5 %
SOLUTION INTRAVENOUS
Status: DISPENSED
Start: 2019-01-01

## (undated) RX ORDER — HYDROMORPHONE HYDROCHLORIDE 1 MG/ML
INJECTION, SOLUTION INTRAMUSCULAR; INTRAVENOUS; SUBCUTANEOUS
Status: DISPENSED
Start: 2019-01-01

## (undated) RX ORDER — ALBUMIN (HUMAN) 12.5 G/50ML
SOLUTION INTRAVENOUS
Status: DISPENSED
Start: 2018-01-01

## (undated) RX ORDER — GLYCOPYRROLATE 0.2 MG/ML
INJECTION, SOLUTION INTRAMUSCULAR; INTRAVENOUS
Status: DISPENSED
Start: 2019-01-01

## (undated) RX ORDER — FENTANYL CITRATE 50 UG/ML
INJECTION, SOLUTION INTRAMUSCULAR; INTRAVENOUS
Status: DISPENSED
Start: 2019-01-01

## (undated) RX ORDER — PROPOFOL 10 MG/ML
INJECTION, EMULSION INTRAVENOUS
Status: DISPENSED
Start: 2019-01-01

## (undated) RX ORDER — ONDANSETRON 2 MG/ML
INJECTION INTRAMUSCULAR; INTRAVENOUS
Status: DISPENSED
Start: 2019-01-01

## (undated) RX ORDER — CEFAZOLIN SODIUM 1 G/3ML
INJECTION, POWDER, FOR SOLUTION INTRAMUSCULAR; INTRAVENOUS
Status: DISPENSED
Start: 2019-01-01